# Patient Record
Sex: FEMALE | Race: BLACK OR AFRICAN AMERICAN | Employment: OTHER | URBAN - METROPOLITAN AREA
[De-identification: names, ages, dates, MRNs, and addresses within clinical notes are randomized per-mention and may not be internally consistent; named-entity substitution may affect disease eponyms.]

---

## 2017-08-04 ENCOUNTER — HOSPITAL ENCOUNTER (EMERGENCY)
Age: 82
Discharge: HOME | End: 2017-08-05
Attending: EMERGENCY MEDICINE
Payer: MEDICARE

## 2017-08-04 ENCOUNTER — APPOINTMENT (OUTPATIENT)
Dept: GENERAL RADIOLOGY | Age: 82
End: 2017-08-04
Attending: PHYSICIAN ASSISTANT
Payer: MEDICARE

## 2017-08-04 DIAGNOSIS — N39.0 URINARY TRACT INFECTION WITHOUT HEMATURIA, SITE UNSPECIFIED: ICD-10-CM

## 2017-08-04 DIAGNOSIS — R07.89 ATYPICAL CHEST PAIN: Primary | ICD-10-CM

## 2017-08-04 LAB
ALBUMIN SERPL BCP-MCNC: 3.4 G/DL (ref 3.4–5)
ALBUMIN/GLOB SERPL: 0.8 {RATIO} (ref 0.8–1.7)
ALP SERPL-CCNC: 93 U/L (ref 45–117)
ALT SERPL-CCNC: 23 U/L (ref 13–56)
ANION GAP BLD CALC-SCNC: 12 MMOL/L (ref 3–18)
APPEARANCE UR: CLEAR
AST SERPL W P-5'-P-CCNC: 23 U/L (ref 15–37)
BACTERIA URNS QL MICRO: ABNORMAL /HPF
BASOPHILS # BLD AUTO: 0 K/UL (ref 0–0.06)
BASOPHILS # BLD: 0 % (ref 0–2)
BILIRUB SERPL-MCNC: 0.4 MG/DL (ref 0.2–1)
BILIRUB UR QL: NEGATIVE
BUN SERPL-MCNC: 15 MG/DL (ref 7–18)
BUN/CREAT SERPL: 14 (ref 12–20)
CALCIUM SERPL-MCNC: 9.1 MG/DL (ref 8.5–10.1)
CHLORIDE SERPL-SCNC: 107 MMOL/L (ref 100–108)
CK MB CFR SERPL CALC: NORMAL % (ref 0–4)
CK MB SERPL-MCNC: <1 NG/ML (ref 5–25)
CK SERPL-CCNC: 95 U/L (ref 26–192)
CO2 SERPL-SCNC: 25 MMOL/L (ref 21–32)
COLOR UR: YELLOW
CREAT SERPL-MCNC: 1.1 MG/DL (ref 0.6–1.3)
DIFFERENTIAL METHOD BLD: ABNORMAL
EOSINOPHIL # BLD: 0.2 K/UL (ref 0–0.4)
EOSINOPHIL NFR BLD: 2 % (ref 0–5)
EPITH CASTS URNS QL MICRO: ABNORMAL /LPF (ref 0–5)
ERYTHROCYTE [DISTWIDTH] IN BLOOD BY AUTOMATED COUNT: 13.8 % (ref 11.6–14.5)
GLOBULIN SER CALC-MCNC: 4.4 G/DL (ref 2–4)
GLUCOSE SERPL-MCNC: 113 MG/DL (ref 74–99)
GLUCOSE UR STRIP.AUTO-MCNC: NEGATIVE MG/DL
HCT VFR BLD AUTO: 37.1 % (ref 35–45)
HGB BLD-MCNC: 12.5 G/DL (ref 12–16)
HGB UR QL STRIP: NEGATIVE
KETONES UR QL STRIP.AUTO: NEGATIVE MG/DL
LEUKOCYTE ESTERASE UR QL STRIP.AUTO: ABNORMAL
LYMPHOCYTES # BLD AUTO: 24 % (ref 21–52)
LYMPHOCYTES # BLD: 2.7 K/UL (ref 0.9–3.6)
MCH RBC QN AUTO: 30.1 PG (ref 24–34)
MCHC RBC AUTO-ENTMCNC: 33.7 G/DL (ref 31–37)
MCV RBC AUTO: 89.4 FL (ref 74–97)
MONOCYTES # BLD: 0.9 K/UL (ref 0.05–1.2)
MONOCYTES NFR BLD AUTO: 8 % (ref 3–10)
NEUTS SEG # BLD: 7.3 K/UL (ref 1.8–8)
NEUTS SEG NFR BLD AUTO: 66 % (ref 40–73)
NITRITE UR QL STRIP.AUTO: NEGATIVE
PH UR STRIP: 5 [PH] (ref 5–8)
PLATELET # BLD AUTO: 310 K/UL (ref 135–420)
PMV BLD AUTO: 9.5 FL (ref 9.2–11.8)
POTASSIUM SERPL-SCNC: 3.9 MMOL/L (ref 3.5–5.5)
PROT SERPL-MCNC: 7.8 G/DL (ref 6.4–8.2)
PROT UR STRIP-MCNC: NEGATIVE MG/DL
RBC # BLD AUTO: 4.15 M/UL (ref 4.2–5.3)
RBC #/AREA URNS HPF: ABNORMAL /HPF (ref 0–5)
SODIUM SERPL-SCNC: 144 MMOL/L (ref 136–145)
SP GR UR REFRACTOMETRY: 1.02 (ref 1–1.03)
TROPONIN I SERPL-MCNC: 0.05 NG/ML (ref 0–0.06)
UROBILINOGEN UR QL STRIP.AUTO: 0.2 EU/DL (ref 0.2–1)
WBC # BLD AUTO: 11.1 K/UL (ref 4.6–13.2)
WBC URNS QL MICRO: ABNORMAL /HPF (ref 0–5)

## 2017-08-04 PROCEDURE — 80053 COMPREHEN METABOLIC PANEL: CPT | Performed by: PHYSICIAN ASSISTANT

## 2017-08-04 PROCEDURE — 82550 ASSAY OF CK (CPK): CPT | Performed by: PHYSICIAN ASSISTANT

## 2017-08-04 PROCEDURE — 99285 EMERGENCY DEPT VISIT HI MDM: CPT

## 2017-08-04 PROCEDURE — 71020 XR CHEST PA LAT: CPT

## 2017-08-04 PROCEDURE — 93005 ELECTROCARDIOGRAM TRACING: CPT

## 2017-08-04 PROCEDURE — 85025 COMPLETE CBC W/AUTO DIFF WBC: CPT | Performed by: PHYSICIAN ASSISTANT

## 2017-08-04 PROCEDURE — 81001 URINALYSIS AUTO W/SCOPE: CPT | Performed by: PHYSICIAN ASSISTANT

## 2017-08-04 NOTE — ED TRIAGE NOTES
Patient with complaints of left arm, chest and pain under her shoulder blade since last night. Patient states that at times it feels like it is taking her breath away. Sepsis Screening completed    (  )Patient meets SIRS criteria. (x  )Patient does not meet SIRS criteria.       SIRS Criteria is achieved when two or more of the following are present   Temperature < 96.8°F (36°C) or > 100.9°F (38.3°C)   Heart Rate > 90 beats per minute   Respiratory Rate > 20 breaths per minute   WBC count > 12,000 or <4,000 or > 10% bands

## 2017-08-04 NOTE — ED PROVIDER NOTES
HPI Comments: 7:44 PM  Bridgette France is a 80 y.o. female with a PMHx of CVA and HTN who presents to the emergency department C/O intermittent CP that radiates down her right arm, onset last night. CP is not associated with exertion. Associated symptoms include SOB, light-headedness (with exertion). Pt states she has had CP like this before, but not with associated dizziness. PMHx of arthritis, and pt has an \"irregular heartbeat\" for which she takes Aggrenox. Other sxs include dysuria and the pt suspects a UTI. Patient denies PHx of MI, and any other symptoms or complaints. Patient is a 80 y.o. female presenting with chest pain. The history is provided by the patient. No  was used. Chest Pain (Angina)    This is a chronic problem. The current episode started 12 to 24 hours ago. The pain radiates to the right arm. Associated symptoms include near-syncope and shortness of breath.         Past Medical History:   Diagnosis Date    Arthritis     osteo    Asthma     inhaler as needed - every 2 to 3 years    Chronic pain     knee and hip    GERD (gastroesophageal reflux disease)     Hypertension     5yrs    Irritable bowel     Other ill-defined conditions     h/o migraines    Other ill-defined conditions     h/o dizzy    Other ill-defined conditions     IBS; hiatal hernia    Stroke ([address] 9 years ago     stroke - no deficits    Stroke ([address]     In 1997/98       Past Surgical History:   Procedure Laterality Date    HX CHOLECYSTECTOMY      HX HERNIA REPAIR      umbilical twice    HX INTRAOCULAR LENS PROSTHESIS      HX KNEE ARTHROSCOPY      left    HX OTHER SURGICAL      herniated disc surgery    HX TUBAL LIGATION      LAP UMBILICAL HERNIA REPAIR      LAP,CHOLECYSTECTOMY           Family History:   Problem Relation Age of Onset    Malignant Hyperthermia Neg Hx     Pseudocholinesterase Deficiency Neg Hx     Post-op Nausea/Vomiting Neg Hx     Delayed Awakening Neg Hx     Emergence Delirium Neg Hx     Post-op Cognitive Dysfunction Neg Hx     Other Neg Hx        Social History     Social History    Marital status: SINGLE     Spouse name: N/A    Number of children: N/A    Years of education: N/A     Occupational History    Not on file. Social History Main Topics    Smoking status: Never Smoker    Smokeless tobacco: Never Used    Alcohol use No    Drug use: No    Sexual activity: Not on file     Other Topics Concern    Not on file     Social History Narrative         ALLERGIES: Review of patient's allergies indicates no known allergies. Review of Systems   Respiratory: Positive for shortness of breath. Cardiovascular: Positive for chest pain and near-syncope. Genitourinary: Positive for dysuria. Neurological: Positive for light-headedness. All other systems reviewed and are negative. Vitals:    08/04/17 2300 08/04/17 2330 08/05/17 0030 08/05/17 0100   BP: 127/48 137/53 150/51 124/66   Pulse: (!) 59 (!) 53 (!) 45 (!) 52   Resp: 21 17 19 20   Temp:       SpO2:  99% 98% 97%   Weight:       Height:                Physical Exam   Constitutional: She is oriented to person, place, and time. She appears well-developed and well-nourished. Obese female, NAD, non toxic    HENT:   Head: Normocephalic and atraumatic. Neck: Normal range of motion. Neck supple. Cardiovascular: Normal rate, regular rhythm, normal heart sounds and intact distal pulses. No murmur heard. Pulmonary/Chest: Effort normal and breath sounds normal. No respiratory distress. She has no wheezes. She has no rales. She exhibits tenderness (minimal ). Abdominal: Soft. Bowel sounds are normal. She exhibits no distension. There is no tenderness. There is no rebound and no guarding. Musculoskeletal:   No leg swelling or tenderness    Neurological: She is alert and oriented to person, place, and time. Psychiatric: She has a normal mood and affect.  Judgment normal.   Nursing note and vitals reviewed. RESULTS:  PULSE OXIMETRY NOTE:  Pulse-ox is 100% on RA  Interpretation: Normal       6:54 PM  EKG interpretation: (Preliminary)  NSR; 63 bpm; 94 ms QRS duration  EKG read by Krystin Evans MD at 6:54 PM    EKG interpretation: (Subsequent)  12:28 AM   Marked Sinus Bradycardia; 47 bpm; Similar to initial EKG  EKG read by Nevaeh Gamble PA-C    XR CHEST PA LAT    (Results Pending)      11:01 PM  RADIOLOGY FINDINGS  Chest X-ray shows no acute process  Pending review by Radiologist  Recorded by Melba Orosco ED Scribe, as dictated by Nevaeh Gamble PA-C.     Labs Reviewed   CBC WITH AUTOMATED DIFF - Abnormal; Notable for the following:        Result Value    RBC 4.15 (*)     All other components within normal limits   METABOLIC PANEL, COMPREHENSIVE - Abnormal; Notable for the following:     Glucose 113 (*)     GFR est AA 58 (*)     GFR est non-AA 48 (*)     Globulin 4.4 (*)     All other components within normal limits   URINALYSIS W/ RFLX MICROSCOPIC - Abnormal; Notable for the following:     Leukocyte Esterase SMALL (*)     All other components within normal limits   URINE MICROSCOPIC ONLY - Abnormal; Notable for the following:     Bacteria 1+ (*)     All other components within normal limits   CARDIAC PANEL,(CK, CKMB & TROPONIN)   CARDIAC PANEL,(CK, CKMB & TROPONIN)       Recent Results (from the past 12 hour(s))   EKG, 12 LEAD, INITIAL    Collection Time: 08/04/17  6:51 PM   Result Value Ref Range    Ventricular Rate 63 BPM    Atrial Rate 63 BPM    P-R Interval 162 ms    QRS Duration 94 ms    Q-T Interval 426 ms    QTC Calculation (Bezet) 435 ms    Calculated P Axis 37 degrees    Calculated R Axis -28 degrees    Calculated T Axis 25 degrees    Diagnosis       Normal sinus rhythm  Voltage criteria for left ventricular hypertrophy  Abnormal ECG  When compared with ECG of 07-OCT-2016 16:00,  No significant change was found     URINALYSIS W/ RFLX MICROSCOPIC    Collection Time: 08/04/17  7:45 PM Result Value Ref Range    Color YELLOW      Appearance CLEAR      Specific gravity 1.017 1.005 - 1.030      pH (UA) 5.0 5.0 - 8.0      Protein NEGATIVE  NEG mg/dL    Glucose NEGATIVE  NEG mg/dL    Ketone NEGATIVE  NEG mg/dL    Bilirubin NEGATIVE  NEG      Blood NEGATIVE  NEG      Urobilinogen 0.2 0.2 - 1.0 EU/dL    Nitrites NEGATIVE  NEG      Leukocyte Esterase SMALL (A) NEG     URINE MICROSCOPIC ONLY    Collection Time: 08/04/17  7:45 PM   Result Value Ref Range    WBC 4 to 6 0 - 5 /hpf    RBC 1 to 2 0 - 5 /hpf    Epithelial cells 2 to 3 0 - 5 /lpf    Bacteria 1+ (A) NEG /hpf   CBC WITH AUTOMATED DIFF    Collection Time: 08/04/17  8:15 PM   Result Value Ref Range    WBC 11.1 4.6 - 13.2 K/uL    RBC 4.15 (L) 4.20 - 5.30 M/uL    HGB 12.5 12.0 - 16.0 g/dL    HCT 37.1 35.0 - 45.0 %    MCV 89.4 74.0 - 97.0 FL    MCH 30.1 24.0 - 34.0 PG    MCHC 33.7 31.0 - 37.0 g/dL    RDW 13.8 11.6 - 14.5 %    PLATELET 417 953 - 554 K/uL    MPV 9.5 9.2 - 11.8 FL    NEUTROPHILS 66 40 - 73 %    LYMPHOCYTES 24 21 - 52 %    MONOCYTES 8 3 - 10 %    EOSINOPHILS 2 0 - 5 %    BASOPHILS 0 0 - 2 %    ABS. NEUTROPHILS 7.3 1.8 - 8.0 K/UL    ABS. LYMPHOCYTES 2.7 0.9 - 3.6 K/UL    ABS. MONOCYTES 0.9 0.05 - 1.2 K/UL    ABS. EOSINOPHILS 0.2 0.0 - 0.4 K/UL    ABS. BASOPHILS 0.0 0.0 - 0.06 K/UL    DF AUTOMATED     METABOLIC PANEL, COMPREHENSIVE    Collection Time: 08/04/17  8:15 PM   Result Value Ref Range    Sodium 144 136 - 145 mmol/L    Potassium 3.9 3.5 - 5.5 mmol/L    Chloride 107 100 - 108 mmol/L    CO2 25 21 - 32 mmol/L    Anion gap 12 3.0 - 18 mmol/L    Glucose 113 (H) 74 - 99 mg/dL    BUN 15 7.0 - 18 MG/DL    Creatinine 1.10 0.6 - 1.3 MG/DL    BUN/Creatinine ratio 14 12 - 20      GFR est AA 58 (L) >60 ml/min/1.73m2    GFR est non-AA 48 (L) >60 ml/min/1.73m2    Calcium 9.1 8.5 - 10.1 MG/DL    Bilirubin, total 0.4 0.2 - 1.0 MG/DL    ALT (SGPT) 23 13 - 56 U/L    AST (SGOT) 23 15 - 37 U/L    Alk.  phosphatase 93 45 - 117 U/L    Protein, total 7.8 6.4 - 8.2 g/dL    Albumin 3.4 3.4 - 5.0 g/dL    Globulin 4.4 (H) 2.0 - 4.0 g/dL    A-G Ratio 0.8 0.8 - 1.7     CARDIAC PANEL,(CK, CKMB & TROPONIN)    Collection Time: 08/04/17  8:15 PM   Result Value Ref Range    CK 95 26 - 192 U/L    CK - MB <1.0 <3.6 ng/ml    CK-MB Index  0.0 - 4.0 %     CALCULATION NOT PERFORMED WHEN RESULT IS BELOW LINEAR LIMIT    Troponin-I, Qt. 0.05 0.00 - 0.06 NG/ML   EKG, 12 LEAD, SUBSEQUENT    Collection Time: 08/05/17 12:09 AM   Result Value Ref Range    Ventricular Rate 47 BPM    Atrial Rate 47 BPM    P-R Interval 192 ms    QRS Duration 94 ms    Q-T Interval 464 ms    QTC Calculation (Bezet) 410 ms    Calculated P Axis 54 degrees    Calculated R Axis -5 degrees    Calculated T Axis 21 degrees    Diagnosis       Marked sinus bradycardia  Voltage criteria for left ventricular hypertrophy  Abnormal ECG  When compared with ECG of 04-AUG-2017 18:51,  No significant change was found     CARDIAC PANEL,(CK, CKMB & TROPONIN)    Collection Time: 08/05/17 12:20 AM   Result Value Ref Range    CK 89 26 - 192 U/L    CK - MB <1.0 <3.6 ng/ml    CK-MB Index  0.0 - 4.0 %     CALCULATION NOT PERFORMED WHEN RESULT IS BELOW LINEAR LIMIT    Troponin-I, Qt. 0.05 0.00 - 0.06 NG/ML         MDM  Number of Diagnoses or Management Options  Atypical chest pain:   Urinary tract infection without hematuria, site unspecified:   Diagnosis management comments: ACS, MI, Arrhthymia, pericarditis, pneumonia, bronchitis, musculoskeletal pain, Ptx, doubt PE          Amount and/or Complexity of Data Reviewed  Clinical lab tests: ordered and reviewed  Tests in the radiology section of CPT®: ordered and reviewed (Chest XR, EKG)  Tests in the medicine section of CPT®: ordered and reviewed (EKG)  Independent visualization of images, tracings, or specimens: yes (Chest XR, EKG)      ED Course     MEDICATIONS GIVEN:  Medications - No data to display    Procedures    PROGRESS NOTE:   7:55 PM  Initial assesment performed.   Written by Messi Antoine, ED Scribe, as dictated by Hannah Luo PA-C.    CONSULT NOTE:   10:02 PM  Hannah Luo PA-C spoke with Jihan Burleson. Marty Silveira MD   Discussed pt's hx, disposition, and available diagnostic and imaging results in the ED. Reviewed care plans. He agrees repeat cardiac panel because of the patient's age, if normal, d/c.        DISCHARGE NOTE:  1:14 AM  Estefania Reina's  results have been reviewed with her. She has been counseled regarding her diagnosis, treatment, and plan. She verbally conveys understanding and agreement of the signs, symptoms, diagnosis, treatment and prognosis and additionally agrees to follow up as discussed. She also agrees with the care-plan and conveys that all of her questions have been answered. I have also provided discharge instructions for her that include: educational information regarding their diagnosis and treatment, and list of reasons why they would want to return to the ED prior to their follow-up appointment, should her condition change. CLINICAL IMPRESSION:    1. Atypical chest pain    2. Urinary tract infection without hematuria, site unspecified        PLAN:  1. D/C Home  2. Discharge Medication List as of 8/5/2017  1:14 AM      START taking these medications    Details   nitrofurantoin, macrocrystal-monohydrate, (MACROBID) 100 mg capsule Take 1 Cap by mouth two (2) times a day for 3 days. , Normal, Disp-6 Cap, R-0         CONTINUE these medications which have NOT CHANGED    Details   Dexlansoprazole (DEXILANT) 60 mg CpDB Take 60 mg by mouth as needed., Historical Med      naproxen sodium (ALEVE) 220 mg cap Take  by mouth., Historical Med      valsartan-hydrochlorothiazide (DIOVAN HCT) 160-25 mg per tablet Take 1 Tab by mouth daily. , Historical Med      potassium chloride (KLOR-CON) 20 mEq packet Take 20 mEq by mouth two (2) times a day.  , Historical Med      dipyridamole-aspirin (AGGRENOX) 200-25 mg per SR capsule Take 1 Cap by mouth daily.   , Historical Med      calcium carbonate (TUMS) 200 mg calcium (500 mg) Chew Take 1 Tab by mouth as needed.  , Historical Med      multivitamin (ONE A DAY) tablet Take 1 Tab by mouth daily. , Historical Med           3. Follow-up Information     Follow up With Details Comments Contact Info    Your Cardiologist Schedule an appointment as soon as possible for a visit in 2 days      [facility]  As needed, If symptoms worsen [address] [place] [id number]  [phone]            ATTESTATION:  This note is prepared by Nic Mann, acting as a Scribe for Zulay Jimenez PA-C on 7:55 PM on 8/4/2017 . Zulay Jimenez PA-C: The scribe's documentation has been prepared under my direction and personally reviewed by me in its entirety.

## 2017-08-05 VITALS
HEART RATE: 52 BPM | HEIGHT: 63 IN | TEMPERATURE: 98.4 F | BODY MASS INDEX: 42.17 KG/M2 | RESPIRATION RATE: 20 BRPM | WEIGHT: 238 LBS | SYSTOLIC BLOOD PRESSURE: 124 MMHG | OXYGEN SATURATION: 97 % | DIASTOLIC BLOOD PRESSURE: 66 MMHG

## 2017-08-05 LAB
ATRIAL RATE: 47 BPM
ATRIAL RATE: 63 BPM
CALCULATED P AXIS, ECG09: 37 DEGREES
CALCULATED P AXIS, ECG09: 54 DEGREES
CALCULATED R AXIS, ECG10: -28 DEGREES
CALCULATED R AXIS, ECG10: -5 DEGREES
CALCULATED T AXIS, ECG11: 21 DEGREES
CALCULATED T AXIS, ECG11: 25 DEGREES
CK MB CFR SERPL CALC: NORMAL % (ref 0–4)
CK MB SERPL-MCNC: <1 NG/ML (ref 5–25)
CK SERPL-CCNC: 89 U/L (ref 26–192)
DIAGNOSIS, 93000: NORMAL
DIAGNOSIS, 93000: NORMAL
P-R INTERVAL, ECG05: 162 MS
P-R INTERVAL, ECG05: 192 MS
Q-T INTERVAL, ECG07: 426 MS
Q-T INTERVAL, ECG07: 464 MS
QRS DURATION, ECG06: 94 MS
QRS DURATION, ECG06: 94 MS
QTC CALCULATION (BEZET), ECG08: 410 MS
QTC CALCULATION (BEZET), ECG08: 435 MS
TROPONIN I SERPL-MCNC: 0.05 NG/ML (ref 0–0.06)
VENTRICULAR RATE, ECG03: 47 BPM
VENTRICULAR RATE, ECG03: 63 BPM

## 2017-08-05 PROCEDURE — 82550 ASSAY OF CK (CPK): CPT | Performed by: PHYSICIAN ASSISTANT

## 2017-08-05 PROCEDURE — 93005 ELECTROCARDIOGRAM TRACING: CPT

## 2017-08-05 RX ORDER — NITROFURANTOIN 25; 75 MG/1; MG/1
100 CAPSULE ORAL 2 TIMES DAILY
Qty: 6 CAP | Refills: 0 | Status: SHIPPED | OUTPATIENT
Start: 2017-08-05 | End: 2017-08-08

## 2017-08-05 RX ORDER — NITROFURANTOIN 25; 75 MG/1; MG/1
100 CAPSULE ORAL 2 TIMES DAILY
Qty: 6 CAP | Refills: 0 | Status: SHIPPED | OUTPATIENT
Start: 2017-08-05 | End: 2017-08-05

## 2017-08-05 NOTE — DISCHARGE INSTRUCTIONS
Chest Pain: Care Instructions  Your Care Instructions  There are many things that can cause chest pain. Some are not serious and will get better on their own in a few days. But some kinds of chest pain need more testing and treatment. Your doctor may have recommended a follow-up visit in the next 8 to 12 hours. If you are not getting better, you may need more tests or treatment. Even though your doctor has released you, you still need to watch for any problems. The doctor carefully checked you, but sometimes problems can develop later. If you have new symptoms or if your symptoms do not get better, get medical care right away. If you have worse or different chest pain or pressure that lasts more than 5 minutes or you passed out (lost consciousness), call 911 or seek other emergency help right away. A medical visit is only one step in your treatment. Even if you feel better, you still need to do what your doctor recommends, such as going to all suggested follow-up appointments and taking medicines exactly as directed. This will help you recover and help prevent future problems. How can you care for yourself at home? · Rest until you feel better. · Take your medicine exactly as prescribed. Call your doctor if you think you are having a problem with your medicine. · Do not drive after taking a prescription pain medicine. When should you call for help? Call 911 if:  · You passed out (lost consciousness). · You have severe difficulty breathing. · You have symptoms of a heart attack. These may include:  ¨ Chest pain or pressure, or a strange feeling in your chest.  ¨ Sweating. ¨ Shortness of breath. ¨ Nausea or vomiting. ¨ Pain, pressure, or a strange feeling in your back, neck, jaw, or upper belly or in one or both shoulders or arms. ¨ Lightheadedness or sudden weakness. ¨ A fast or irregular heartbeat.   After you call 911, the  may tell you to chew 1 adult-strength or 2 to 4 low-dose aspirin. Wait for an ambulance. Do not try to drive yourself. Call your doctor today if:  · You have any trouble breathing. · Your chest pain gets worse. · You are dizzy or lightheaded, or you feel like you may faint. · You are not getting better as expected. · You are having new or different chest pain. Where can you learn more? Go to http://shell-david.info/. Enter A120 in the search box to learn more about \"Chest Pain: Care Instructions. \"  Current as of: March 20, 2017  Content Version: 11.3  © 9208-2162 [employer]. Care instructions adapted under license by [employer] (which disclaims liability or warranty for this information). If you have questions about a medical condition or this instruction, always ask your healthcare professional. [address] any warranty or liability for your use of this information. Urinary Tract Infection in Women: Care Instructions  Your Care Instructions    A urinary tract infection, or UTI, is a general term for an infection anywhere between the kidneys and the urethra (where urine comes out). Most UTIs are bladder infections. They often cause pain or burning when you urinate. UTIs are caused by bacteria and can be cured with antibiotics. Be sure to complete your treatment so that the infection goes away. Follow-up care is a key part of your treatment and safety. Be sure to make and go to all appointments, and call your doctor if you are having problems. It's also a good idea to know your test results and keep a list of the medicines you take. How can you care for yourself at home? · Take your antibiotics as directed. Do not stop taking them just because you feel better. You need to take the full course of antibiotics. · Drink extra water and other fluids for the next day or two. This may help wash out the bacteria that are causing the infection.  (If you have kidney, heart, or liver disease and have to limit fluids, talk with your doctor before you increase your fluid intake.)  · Avoid drinks that are carbonated or have caffeine. They can irritate the bladder. · Urinate often. Try to empty your bladder each time. · To relieve pain, take a hot bath or lay a heating pad set on low over your lower belly or genital area. Never go to sleep with a heating pad in place. To prevent UTIs  · Drink plenty of water each day. This helps you urinate often, which clears bacteria from your system. (If you have kidney, heart, or liver disease and have to limit fluids, talk with your doctor before you increase your fluid intake.)  · Urinate when you need to. · Urinate right after you have sex. · Change sanitary pads often. · Avoid douches, bubble baths, feminine hygiene sprays, and other feminine hygiene products that have deodorants. · After going to the bathroom, wipe from front to back. When should you call for help? Call your doctor now or seek immediate medical care if:  · Symptoms such as fever, chills, nausea, or vomiting get worse or appear for the first time. · You have new pain in your back just below your rib cage. This is called flank pain. · There is new blood or pus in your urine. · You have any problems with your antibiotic medicine. Watch closely for changes in your health, and be sure to contact your doctor if:  · You are not getting better after taking an antibiotic for 2 days. · Your symptoms go away but then come back. Where can you learn more? Go to http://shell-david.info/. Enter Z394 in the search box to learn more about \"Urinary Tract Infection in Women: Care Instructions. \"  Current as of: November 28, 2016  Content Version: 11.3  © 0361-6036 [employer]. Care instructions adapted under license by [employer] (which disclaims liability or warranty for this information).  If you have questions about a medical condition or this instruction, always ask your healthcare professional. [place] [address] any warranty or liability for your use of this information.

## 2017-08-05 NOTE — ED NOTES
[facility] was discharged in good and improved condition. The patient's diagnosis, condition and treatment were explained to patient and aftercare instructions were given. The patient verbalized good understanding. Patient armband removed and both labels and armband were placed in shred bin. Patient left ER **ambulatory with steady gait in no acute distress.

## 2017-08-05 NOTE — ED NOTES
Patient on call bell, asking about results. Pt informed awaiting repeat cardiac enzymes and EKG at midnight. Pt remains on cardiac monitor, resting in bed in no distress. Daughter at bedside.

## 2017-08-07 NOTE — CALL BACK NOTE
6:30 PM  08/07/17    Received notification of failed transmission of electronic rx for Macrobid. Attempted to call pt, no answer. Left message to return call.      Keely Hathaway PA-C

## 2017-11-29 ENCOUNTER — HOSPITAL ENCOUNTER (EMERGENCY)
Age: 82
Discharge: HOME | End: 2017-11-29
Attending: EMERGENCY MEDICINE
Payer: MEDICARE

## 2017-11-29 ENCOUNTER — APPOINTMENT (OUTPATIENT)
Dept: GENERAL RADIOLOGY | Age: 82
End: 2017-11-29
Attending: EMERGENCY MEDICINE
Payer: MEDICARE

## 2017-11-29 VITALS
DIASTOLIC BLOOD PRESSURE: 56 MMHG | BODY MASS INDEX: 42.52 KG/M2 | HEIGHT: 63 IN | RESPIRATION RATE: 28 BRPM | WEIGHT: 240 LBS | TEMPERATURE: 98.3 F | OXYGEN SATURATION: 98 % | HEART RATE: 62 BPM | SYSTOLIC BLOOD PRESSURE: 161 MMHG

## 2017-11-29 DIAGNOSIS — R07.9 CHEST PAIN, UNSPECIFIED TYPE: Primary | ICD-10-CM

## 2017-11-29 DIAGNOSIS — K21.9 GASTROESOPHAGEAL REFLUX DISEASE WITHOUT ESOPHAGITIS: ICD-10-CM

## 2017-11-29 LAB
ALBUMIN SERPL-MCNC: 3.5 G/DL (ref 3.4–5)
ALBUMIN/GLOB SERPL: 0.7 {RATIO} (ref 0.8–1.7)
ALP SERPL-CCNC: 104 U/L (ref 45–117)
ALT SERPL-CCNC: 31 U/L (ref 13–56)
ANION GAP SERPL CALC-SCNC: 12 MMOL/L (ref 3–18)
APPEARANCE UR: CLEAR
AST SERPL-CCNC: 26 U/L (ref 15–37)
BASOPHILS # BLD: 0 K/UL (ref 0–0.06)
BASOPHILS NFR BLD: 0 % (ref 0–2)
BILIRUB SERPL-MCNC: 0.3 MG/DL (ref 0.2–1)
BILIRUB UR QL: NEGATIVE
BUN SERPL-MCNC: 18 MG/DL (ref 7–18)
BUN/CREAT SERPL: 19 (ref 12–20)
CALCIUM SERPL-MCNC: 9.9 MG/DL (ref 8.5–10.1)
CHLORIDE SERPL-SCNC: 107 MMOL/L (ref 100–108)
CK MB CFR SERPL CALC: NORMAL % (ref 0–4)
CK MB CFR SERPL CALC: NORMAL % (ref 0–4)
CK MB SERPL-MCNC: <1 NG/ML (ref 5–25)
CK MB SERPL-MCNC: <1 NG/ML (ref 5–25)
CK SERPL-CCNC: 119 U/L (ref 26–192)
CK SERPL-CCNC: 99 U/L (ref 26–192)
CO2 SERPL-SCNC: 26 MMOL/L (ref 21–32)
COLOR UR: YELLOW
CREAT SERPL-MCNC: 0.97 MG/DL (ref 0.6–1.3)
D DIMER PPP FEU-MCNC: 0.58 UG/ML(FEU)
DIFFERENTIAL METHOD BLD: ABNORMAL
EOSINOPHIL # BLD: 0.2 K/UL (ref 0–0.4)
EOSINOPHIL NFR BLD: 2 % (ref 0–5)
ERYTHROCYTE [DISTWIDTH] IN BLOOD BY AUTOMATED COUNT: 14.4 % (ref 11.6–14.5)
GLOBULIN SER CALC-MCNC: 5 G/DL (ref 2–4)
GLUCOSE SERPL-MCNC: 89 MG/DL (ref 74–99)
GLUCOSE UR STRIP.AUTO-MCNC: NEGATIVE MG/DL
HCT VFR BLD AUTO: 37.7 % (ref 35–45)
HGB BLD-MCNC: 12.8 G/DL (ref 12–16)
HGB UR QL STRIP: NEGATIVE
KETONES UR QL STRIP.AUTO: NEGATIVE MG/DL
LEUKOCYTE ESTERASE UR QL STRIP.AUTO: NEGATIVE
LYMPHOCYTES # BLD: 2.5 K/UL (ref 0.9–3.6)
LYMPHOCYTES NFR BLD: 21 % (ref 21–52)
MCH RBC QN AUTO: 30.7 PG (ref 24–34)
MCHC RBC AUTO-ENTMCNC: 34 G/DL (ref 31–37)
MCV RBC AUTO: 90.4 FL (ref 74–97)
MONOCYTES # BLD: 0.8 K/UL (ref 0.05–1.2)
MONOCYTES NFR BLD: 7 % (ref 3–10)
NEUTS SEG # BLD: 8.4 K/UL (ref 1.8–8)
NEUTS SEG NFR BLD: 70 % (ref 40–73)
NITRITE UR QL STRIP.AUTO: NEGATIVE
PH UR STRIP: 5 [PH] (ref 5–8)
PLATELET # BLD AUTO: 359 K/UL (ref 135–420)
PMV BLD AUTO: 9.8 FL (ref 9.2–11.8)
POTASSIUM SERPL-SCNC: 4.2 MMOL/L (ref 3.5–5.5)
PROT SERPL-MCNC: 8.5 G/DL (ref 6.4–8.2)
PROT UR STRIP-MCNC: NEGATIVE MG/DL
RBC # BLD AUTO: 4.17 M/UL (ref 4.2–5.3)
SODIUM SERPL-SCNC: 145 MMOL/L (ref 136–145)
SP GR UR REFRACTOMETRY: 1.01 (ref 1–1.03)
TROPONIN I SERPL-MCNC: 0.04 NG/ML (ref 0–0.06)
TROPONIN I SERPL-MCNC: 0.04 NG/ML (ref 0–0.06)
UROBILINOGEN UR QL STRIP.AUTO: 0.2 EU/DL (ref 0.2–1)
WBC # BLD AUTO: 12 K/UL (ref 4.6–13.2)

## 2017-11-29 PROCEDURE — 93005 ELECTROCARDIOGRAM TRACING: CPT

## 2017-11-29 PROCEDURE — 85025 COMPLETE CBC W/AUTO DIFF WBC: CPT | Performed by: EMERGENCY MEDICINE

## 2017-11-29 PROCEDURE — 74011250637 HC RX REV CODE- 250/637: Performed by: EMERGENCY MEDICINE

## 2017-11-29 PROCEDURE — 81003 URINALYSIS AUTO W/O SCOPE: CPT | Performed by: EMERGENCY MEDICINE

## 2017-11-29 PROCEDURE — 96374 THER/PROPH/DIAG INJ IV PUSH: CPT

## 2017-11-29 PROCEDURE — 82550 ASSAY OF CK (CPK): CPT | Performed by: EMERGENCY MEDICINE

## 2017-11-29 PROCEDURE — 71010 XR CHEST PORT: CPT

## 2017-11-29 PROCEDURE — 99285 EMERGENCY DEPT VISIT HI MDM: CPT

## 2017-11-29 PROCEDURE — 74011000250 HC RX REV CODE- 250: Performed by: EMERGENCY MEDICINE

## 2017-11-29 PROCEDURE — 80053 COMPREHEN METABOLIC PANEL: CPT | Performed by: EMERGENCY MEDICINE

## 2017-11-29 PROCEDURE — 85379 FIBRIN DEGRADATION QUANT: CPT | Performed by: EMERGENCY MEDICINE

## 2017-11-29 RX ORDER — MAG HYDROX/ALUMINUM HYD/SIMETH 200-200-20
30 SUSPENSION, ORAL (FINAL DOSE FORM) ORAL
Qty: 300 ML | Refills: 0 | Status: SHIPPED | OUTPATIENT
Start: 2017-11-29 | End: 2018-06-04

## 2017-11-29 RX ORDER — FAMOTIDINE 10 MG/ML
20 INJECTION INTRAVENOUS
Status: COMPLETED | OUTPATIENT
Start: 2017-11-29 | End: 2017-11-29

## 2017-11-29 RX ADMIN — FAMOTIDINE 20 MG: 10 INJECTION, SOLUTION INTRAVENOUS at 18:50

## 2017-11-29 RX ADMIN — Medication 30 ML: at 18:50

## 2017-11-29 NOTE — ED TRIAGE NOTES
Pt came to ED with c/o mid sternal CP since last night. Pt stated she had dark stool with last BM today. Hx of a-fib and take blood thinner. Pt was given ASA in route.

## 2017-11-29 NOTE — ED PROVIDER NOTES
EMERGENCY DEPARTMENT HISTORY AND PHYSICAL EXAM    Date: 11/29/2017  Patient Name: Bennie Luna    History of Presenting Illness     Chief Complaint   Patient presents with    Chest Pain         History Provided By: Patient    Chief Complaint: CP  Duration: 1 Days  Timing:  Gradual  Location: chest  Associated Symptoms: black stool, Ha, abd pain and back pain    Additional History (Context):   6:09 PM  Bennie Luna is a 80 y.o. female with PMHX HTN and stroke who presents to the emergency department C/O 10/10 CP onset last night when pt was in bed trying to fall asleep. Per pt it started off as a burning sensation then pain started and radiated into right shoulder and back. Associated sxs include black stool, HA, abd pain and back pain. In ED now the pain is rated 5/10. Pt had cardiac cath done by . Pt denies Hx of blood stool, using pain meds of Pepto bismol , smoking, and any other sxs or complaints. PCP: Yoel Cardona MD    Current Outpatient Prescriptions   Medication Sig Dispense Refill    alum-mag hydroxide-simeth (MYLANTA) 110-840-20 mg/5 mL susp Take 30 mL by mouth every four (4) hours as needed. 300 mL 0    Dexlansoprazole (DEXILANT) 60 mg CpDB Take 60 mg by mouth as needed.  naproxen sodium (ALEVE) 220 mg cap Take  by mouth.  valsartan-hydrochlorothiazide (DIOVAN HCT) 160-25 mg per tablet Take 1 Tab by mouth daily.  potassium chloride (KLOR-CON) 20 mEq packet Take 20 mEq by mouth two (2) times a day.  dipyridamole-aspirin (AGGRENOX) 200-25 mg per SR capsule Take 1 Cap by mouth daily.  calcium carbonate (TUMS) 200 mg calcium (500 mg) Chew Take 1 Tab by mouth as needed.  multivitamin (ONE A DAY) tablet Take 1 Tab by mouth daily.            Past History     Past Medical History:  Past Medical History:   Diagnosis Date    Arthritis     osteo    Asthma     inhaler as needed - every 2 to 3 years    Chronic pain     knee and hip    GERD (gastroesophageal reflux disease)     Hypertension     5yrs    Irritable bowel     Other ill-defined conditions     h/o migraines    Other ill-defined conditions     h/o dizzy    Other ill-defined conditions     IBS; hiatal hernia    Stroke ([facility] [address] 9 years ago     stroke - no deficits    Stroke ([facility] [address]     In 1997/98       Past Surgical History:  Past Surgical History:   Procedure Laterality Date    HX CHOLECYSTECTOMY      HX HERNIA REPAIR      umbilical twice    HX INTRAOCULAR LENS PROSTHESIS      HX KNEE ARTHROSCOPY      left    HX OTHER SURGICAL      herniated disc surgery    HX TUBAL LIGATION      LAP UMBILICAL HERNIA REPAIR      LAP,CHOLECYSTECTOMY         Family History:  Family History   Problem Relation Age of Onset    Malignant Hyperthermia Neg Hx     Pseudocholinesterase Deficiency Neg Hx     Post-op Nausea/Vomiting Neg Hx     Delayed Awakening Neg Hx     Emergence Delirium Neg Hx     Post-op Cognitive Dysfunction Neg Hx     Other Neg Hx        Social History:  Social History   Substance Use Topics    Smoking status: Never Smoker    Smokeless tobacco: Never Used    Alcohol use No       Allergies:  No Known Allergies      Review of Systems   Review of Systems   Cardiovascular: Positive for chest pain. Gastrointestinal: Positive for abdominal pain. Musculoskeletal: Positive for arthralgias and back pain. Neurological: Positive for headaches. All other systems reviewed and are negative. Physical Exam     Vitals:    11/29/17 1748   BP: 151/49   Pulse: (!) 59   Resp: 18   Temp: 98.3 °F (36.8 °C)   SpO2: 100%   Weight: 108.9 kg (240 lb)   Height: 5' 3\" (1.6 m)     Physical Exam   Constitutional: She is oriented to person, place, and time. She appears well-developed and well-nourished. HENT:   Head: Normocephalic and atraumatic.    Right Ear: External ear normal.   Left Ear: External ear normal.   Nose: Nose normal.   Mouth/Throat: Oropharynx is clear and moist.   Eyes: Conjunctivae and EOM are normal. Pupils are equal, round, and reactive to light. Neck: Normal range of motion. Neck supple. No JVD present. No tracheal deviation present. Cardiovascular: Normal rate, regular rhythm, normal heart sounds and intact distal pulses. Exam reveals no gallop and no friction rub. No murmur heard. Pulmonary/Chest: Effort normal and breath sounds normal. No respiratory distress. She has no wheezes. She has no rales. Abdominal: Soft. Bowel sounds are normal. She exhibits no distension and no mass. There is no tenderness. There is no rebound and no guarding. Musculoskeletal: Normal range of motion. She exhibits no edema or tenderness. Neurological: She is alert and oriented to person, place, and time. She has normal reflexes. No cranial nerve deficit. Skin: Skin is warm and dry. No rash noted. Psychiatric: She has a normal mood and affect. Her behavior is normal.   Nursing note and vitals reviewed. Rectal exam: negative without mass, lesions or tenderness. dark brown stool hem negative. Normal tone.       Diagnostic Study Results     Labs -     Recent Results (from the past 12 hour(s))   EKG, 12 LEAD, INITIAL    Collection Time: 11/29/17  5:49 PM   Result Value Ref Range    Ventricular Rate 58 BPM    Atrial Rate 58 BPM    P-R Interval 158 ms    QRS Duration 84 ms    Q-T Interval 420 ms    QTC Calculation (Bezet) 412 ms    Calculated P Axis 40 degrees    Calculated R Axis -17 degrees    Calculated T Axis 25 degrees    Diagnosis       Sinus bradycardia  Voltage criteria for left ventricular hypertrophy  Abnormal ECG  When compared with ECG of 05-AUG-2017 00:09,  No significant change was found     CBC WITH AUTOMATED DIFF    Collection Time: 11/29/17  6:30 PM   Result Value Ref Range    WBC 12.0 4.6 - 13.2 K/uL    RBC 4.17 (L) 4.20 - 5.30 M/uL    HGB 12.8 12.0 - 16.0 g/dL    HCT 37.7 35.0 - 45.0 %    MCV 90.4 74.0 - 97.0 FL    MCH 30.7 24.0 - 34.0 PG    MCHC 34.0 31.0 - 37.0 g/dL RDW 14.4 11.6 - 14.5 %    PLATELET 029 409 - 990 K/uL    MPV 9.8 9.2 - 11.8 FL    NEUTROPHILS 70 40 - 73 %    LYMPHOCYTES 21 21 - 52 %    MONOCYTES 7 3 - 10 %    EOSINOPHILS 2 0 - 5 %    BASOPHILS 0 0 - 2 %    ABS. NEUTROPHILS 8.4 (H) 1.8 - 8.0 K/UL    ABS. LYMPHOCYTES 2.5 0.9 - 3.6 K/UL    ABS. MONOCYTES 0.8 0.05 - 1.2 K/UL    ABS. EOSINOPHILS 0.2 0.0 - 0.4 K/UL    ABS. BASOPHILS 0.0 0.0 - 0.06 K/UL    DF AUTOMATED     METABOLIC PANEL, COMPREHENSIVE    Collection Time: 11/29/17  6:30 PM   Result Value Ref Range    Sodium 145 136 - 145 mmol/L    Potassium 4.2 3.5 - 5.5 mmol/L    Chloride 107 100 - 108 mmol/L    CO2 26 21 - 32 mmol/L    Anion gap 12 3.0 - 18 mmol/L    Glucose 89 74 - 99 mg/dL    BUN 18 7.0 - 18 MG/DL    Creatinine 0.97 0.6 - 1.3 MG/DL    BUN/Creatinine ratio 19 12 - 20      GFR est AA >60 >60 ml/min/1.73m2    GFR est non-AA 55 (L) >60 ml/min/1.73m2    Calcium 9.9 8.5 - 10.1 MG/DL    Bilirubin, total 0.3 0.2 - 1.0 MG/DL    ALT (SGPT) 31 13 - 56 U/L    AST (SGOT) 26 15 - 37 U/L    Alk.  phosphatase 104 45 - 117 U/L    Protein, total 8.5 (H) 6.4 - 8.2 g/dL    Albumin 3.5 3.4 - 5.0 g/dL    Globulin 5.0 (H) 2.0 - 4.0 g/dL    A-G Ratio 0.7 (L) 0.8 - 1.7     CARDIAC PANEL,(CK, CKMB & TROPONIN)    Collection Time: 11/29/17  6:30 PM   Result Value Ref Range     26 - 192 U/L    CK - MB <1.0 <3.6 ng/ml    CK-MB Index  0.0 - 4.0 %     CALCULATION NOT PERFORMED WHEN RESULT IS BELOW LINEAR LIMIT    Troponin-I, Qt. 0.04 0.00 - 0.06 NG/ML   D DIMER    Collection Time: 11/29/17  6:30 PM   Result Value Ref Range    D DIMER 0.58 (H) <0.46 ug/ml(FEU)   CARDIAC PANEL,(CK, CKMB & TROPONIN)    Collection Time: 11/29/17  8:45 PM   Result Value Ref Range    CK 99 26 - 192 U/L    CK - MB <1.0 <3.6 ng/ml    CK-MB Index  0.0 - 4.0 %     CALCULATION NOT PERFORMED WHEN RESULT IS BELOW LINEAR LIMIT    Troponin-I, Qt. 0.04 0.00 - 0.06 NG/ML   URINALYSIS W/ RFLX MICROSCOPIC    Collection Time: 11/29/17  9:00 PM   Result Value Ref Range    Color YELLOW      Appearance CLEAR      Specific gravity 1.012 1.005 - 1.030      pH (UA) 5.0 5.0 - 8.0      Protein NEGATIVE  NEG mg/dL    Glucose NEGATIVE  NEG mg/dL    Ketone NEGATIVE  NEG mg/dL    Bilirubin NEGATIVE  NEG      Blood NEGATIVE  NEG      Urobilinogen 0.2 0.2 - 1.0 EU/dL    Nitrites NEGATIVE  NEG      Leukocyte Esterase NEGATIVE  NEG         Radiologic Studies -   9:42 PM  RADIOLOGY FINDINGS  chest X-ray shows NAP  Pending review by Radiologist  Recorded by Travis Tam ED Scribe, as dictated by Nicholas Snow MD   XR CHEST PORT    (Results Pending)     CT Results  (Last 48 hours)    None        CXR Results  (Last 48 hours)    None          Medications given in the ED-  Medications   famotidine (PF) (PEPCID) injection 20 mg (20 mg IntraVENous Given 11/29/17 1850)   GI COCKTAIL [facility]) (30 mL Oral Given 11/29/17 1850)         Medical Decision Making   I am the first provider for this patient. I reviewed the vital signs, available nursing notes, past medical history, past surgical history, family history and social history. Ddx:ACS, PE, GERD, colic     Vital Signs-Reviewed the patient's vital signs. Pulse Oximetry Analysis - 100% on RA     Cardiac Monitor:  Rate: 61 bpm  Rhythm: NSR    EKG interpretation: (Preliminary)  6:09 PM   Rate 61 bpm. NSR. LVH. No acute changes. EKG read by Nicholas Snow MD at 17:51     EKG interpretation: (secondary)  8:57 PM   Rate 57 bpm. Sinus hayley. LVH. No acute changes. EKG read by Nicholas Snow MD  at 20:45     Result Impression   Unremarkable stress and rest myocardial imaging study.  Please correlate  with the stress test results.     INTERPRETING PHYSICIAN: MD BRITTNEY Campuzano//Report ID: [id number]  Creation Date: 02/07/17 12:20 pm  ELECTRONICALLY SIGNED BY: Jorge Chong MD  Date Signed: 02/07/17 12:22 pm   Result Narrative     [id number]             2/7/2017 11:56:17 AM      RESULT  EXAM DESCRIPTION:  NM MULTI MYOCARD PERF    COMPLETED DATE/TIME:  2/7/2017 11:56 am    REASON FOR EXAM:  Chest pain    TECHNIQUE:  A resting exam was initially performed with 10 mCi Tc 99m sestamibi,  followed by gated SPECT imaging of the heart.  The patient then underwent  pharmacologic stress with Lexiscan, with reinjection of 30 mCi Tc 99m  sestamibi, followed by additional gated SPECT imaging of the heart. REPORT:  There is uniform uptake of activity throughout the left ventricular wall  and septum.  There is no evidence of a discrete reversible defect. Review of the cine images demonstrates no gross left ventricular wall  motion abnormalities.  The left ventricular ejection fraction is calculated  to be 62%. The lung - heart ratio is 0.34 (normal </= 0.45).   The TID is  1.0 (normal </= 1.22). Status Results Details       Procedures:  Procedures    ED Course:   6:09 PM Initial assessment performed. The patients presenting problems have been discussed, and they are in agreement with the care plan formulated and outlined with them. I have encouraged them to ask questions as they arise throughout their visit.    7:07 PM Pt's CP got better with GI cocktail. 7:39 PM Pt is feeling better now. 8:11 PM Discussed patient's history, exam, and available diagnostics results with Guilherme Dalal MD, hospitalist, who thinks this is GI related. He recommends 2nd set of cardiac enzymes, if negative pt can be discharged if not then she can be admitted. Diagnosis and Disposition     Discussion:  Patient was stable in the ED. Serial ECGs and Troponins showed no evidence of ACS. Age adjusted d-dimer unremarkable, doubt PE. CXR unremarkable. Patient's chest discomfort resolved after Pepcid and GI cocktail. Labs unremarkable. Prior records reviewed and Nuclear Medicine cardiac scan unremarkable 2/2017. Patient will follow-up with PCP and outpatient cardiac evaluation. Patient was given prescription for Mylanta.     DISCHARGE NOTE:  9:42 PM  Artist Cayla Latosha's  results have been reviewed with her. She has been counseled regarding her diagnosis, treatment, and plan. She verbally conveys understanding and agreement of the signs, symptoms, diagnosis, treatment and prognosis and additionally agrees to follow up as discussed. She also agrees with the care-plan and conveys that all of her questions have been answered. I have also provided discharge instructions for her that include: educational information regarding their diagnosis and treatment, and list of reasons why they would want to return to the ED prior to their follow-up appointment, should her condition change. She has been provided with education for proper emergency department utilization. CLINICAL IMPRESSION:    1. Chest pain, unspecified type    2. Gastroesophageal reflux disease without esophagitis        PLAN:  1. D/C Home  2. Current Discharge Medication List      START taking these medications    Details   alum-mag hydroxide-simeth (MYLANTA) 200-200-20 mg/5 mL susp Take 30 mL by mouth every four (4) hours as needed. Qty: 300 mL, Refills: 0           3. Follow-up Information     Follow up With Details Comments Contact Info    Nava Horner MD Schedule an appointment as soon as possible for a visit in 2 days  [address] [facility] EMERGENCY DEPT  As needed, If symptoms worsen [address] [place] [id number]  [phone]        _______________________________    Attestations: This note is prepared by Robb Cooper , acting as Scribe for Heavenly Bustamante MD.    Heavenly Bustamante MD:  The scribe's documentation has been prepared under my direction and personally reviewed by me in its entirety.   I confirm that the note above accurately reflects all work, treatment, procedures, and medical decision making performed by me.  _______________________________

## 2017-11-30 NOTE — ED NOTES
Pt stable. NO signs of distress. NO complaints at this time. VS stable. Pt discharged home. No questions at this time. I have reviewed discharge instructions with the patient. The patient verbalized understanding.

## 2017-12-03 LAB
ATRIAL RATE: 57 BPM
ATRIAL RATE: 61 BPM
CALCULATED P AXIS, ECG09: 45 DEGREES
CALCULATED P AXIS, ECG09: 49 DEGREES
CALCULATED R AXIS, ECG10: -10 DEGREES
CALCULATED R AXIS, ECG10: -16 DEGREES
CALCULATED T AXIS, ECG11: 13 DEGREES
CALCULATED T AXIS, ECG11: 22 DEGREES
DIAGNOSIS, 93000: NORMAL
DIAGNOSIS, 93000: NORMAL
P-R INTERVAL, ECG05: 166 MS
P-R INTERVAL, ECG05: 168 MS
Q-T INTERVAL, ECG07: 422 MS
Q-T INTERVAL, ECG07: 442 MS
QRS DURATION, ECG06: 82 MS
QRS DURATION, ECG06: 88 MS
QTC CALCULATION (BEZET), ECG08: 424 MS
QTC CALCULATION (BEZET), ECG08: 430 MS
VENTRICULAR RATE, ECG03: 57 BPM
VENTRICULAR RATE, ECG03: 61 BPM

## 2017-12-19 ENCOUNTER — HOSPITAL ENCOUNTER (EMERGENCY)
Age: 82
Discharge: HOME | End: 2017-12-19
Attending: EMERGENCY MEDICINE
Payer: MEDICARE

## 2017-12-19 ENCOUNTER — APPOINTMENT (OUTPATIENT)
Dept: GENERAL RADIOLOGY | Age: 82
End: 2017-12-19
Attending: EMERGENCY MEDICINE
Payer: MEDICARE

## 2017-12-19 VITALS
DIASTOLIC BLOOD PRESSURE: 78 MMHG | HEIGHT: 63 IN | BODY MASS INDEX: 42.17 KG/M2 | OXYGEN SATURATION: 100 % | RESPIRATION RATE: 20 BRPM | TEMPERATURE: 98.6 F | WEIGHT: 238 LBS | HEART RATE: 59 BPM | SYSTOLIC BLOOD PRESSURE: 161 MMHG

## 2017-12-19 DIAGNOSIS — M54.6 CHRONIC LEFT-SIDED THORACIC BACK PAIN: Primary | ICD-10-CM

## 2017-12-19 DIAGNOSIS — G89.29 CHRONIC LEFT-SIDED THORACIC BACK PAIN: Primary | ICD-10-CM

## 2017-12-19 LAB
ALBUMIN SERPL-MCNC: 3.1 G/DL (ref 3.4–5)
ALBUMIN/GLOB SERPL: 0.7 {RATIO} (ref 0.8–1.7)
ALP SERPL-CCNC: 100 U/L (ref 45–117)
ALT SERPL-CCNC: 27 U/L (ref 13–56)
ANION GAP SERPL CALC-SCNC: 12 MMOL/L (ref 3–18)
APPEARANCE UR: CLEAR
AST SERPL-CCNC: 29 U/L (ref 15–37)
ATRIAL RATE: 59 BPM
BACTERIA URNS QL MICRO: ABNORMAL /HPF
BASOPHILS # BLD: 0 K/UL (ref 0–0.06)
BASOPHILS NFR BLD: 0 % (ref 0–2)
BILIRUB SERPL-MCNC: 0.3 MG/DL (ref 0.2–1)
BILIRUB UR QL: NEGATIVE
BUN SERPL-MCNC: 19 MG/DL (ref 7–18)
BUN/CREAT SERPL: 20 (ref 12–20)
CALCIUM SERPL-MCNC: 8.9 MG/DL (ref 8.5–10.1)
CALCULATED P AXIS, ECG09: 54 DEGREES
CALCULATED R AXIS, ECG10: -10 DEGREES
CALCULATED T AXIS, ECG11: 16 DEGREES
CHLORIDE SERPL-SCNC: 109 MMOL/L (ref 100–108)
CK MB CFR SERPL CALC: 0.7 % (ref 0–4)
CK MB SERPL-MCNC: 1 NG/ML (ref 5–25)
CK SERPL-CCNC: 141 U/L (ref 26–192)
CO2 SERPL-SCNC: 24 MMOL/L (ref 21–32)
COLOR UR: YELLOW
CREAT SERPL-MCNC: 0.96 MG/DL (ref 0.6–1.3)
DIAGNOSIS, 93000: NORMAL
DIFFERENTIAL METHOD BLD: ABNORMAL
EOSINOPHIL # BLD: 0.2 K/UL (ref 0–0.4)
EOSINOPHIL NFR BLD: 2 % (ref 0–5)
EPITH CASTS URNS QL MICRO: ABNORMAL /LPF (ref 0–5)
ERYTHROCYTE [DISTWIDTH] IN BLOOD BY AUTOMATED COUNT: 14.1 % (ref 11.6–14.5)
GLOBULIN SER CALC-MCNC: 4.5 G/DL (ref 2–4)
GLUCOSE SERPL-MCNC: 113 MG/DL (ref 74–99)
GLUCOSE UR STRIP.AUTO-MCNC: NEGATIVE MG/DL
HCT VFR BLD AUTO: 35 % (ref 35–45)
HGB BLD-MCNC: 11.9 G/DL (ref 12–16)
HGB UR QL STRIP: NEGATIVE
KETONES UR QL STRIP.AUTO: NEGATIVE MG/DL
LEUKOCYTE ESTERASE UR QL STRIP.AUTO: ABNORMAL
LYMPHOCYTES # BLD: 2.5 K/UL (ref 0.9–3.6)
LYMPHOCYTES NFR BLD: 23 % (ref 21–52)
MCH RBC QN AUTO: 30.5 PG (ref 24–34)
MCHC RBC AUTO-ENTMCNC: 34 G/DL (ref 31–37)
MCV RBC AUTO: 89.7 FL (ref 74–97)
MONOCYTES # BLD: 0.7 K/UL (ref 0.05–1.2)
MONOCYTES NFR BLD: 6 % (ref 3–10)
NEUTS SEG # BLD: 7.7 K/UL (ref 1.8–8)
NEUTS SEG NFR BLD: 69 % (ref 40–73)
NITRITE UR QL STRIP.AUTO: NEGATIVE
P-R INTERVAL, ECG05: 170 MS
PH UR STRIP: 5 [PH] (ref 5–8)
PLATELET # BLD AUTO: 354 K/UL (ref 135–420)
PMV BLD AUTO: 10 FL (ref 9.2–11.8)
POTASSIUM SERPL-SCNC: 4.1 MMOL/L (ref 3.5–5.5)
PROT SERPL-MCNC: 7.6 G/DL (ref 6.4–8.2)
PROT UR STRIP-MCNC: NEGATIVE MG/DL
Q-T INTERVAL, ECG07: 432 MS
QRS DURATION, ECG06: 96 MS
QTC CALCULATION (BEZET), ECG08: 427 MS
RBC # BLD AUTO: 3.9 M/UL (ref 4.2–5.3)
RBC #/AREA URNS HPF: ABNORMAL /HPF (ref 0–5)
SODIUM SERPL-SCNC: 145 MMOL/L (ref 136–145)
SP GR UR REFRACTOMETRY: 1.02 (ref 1–1.03)
TROPONIN I SERPL-MCNC: 0.04 NG/ML (ref 0–0.06)
UROBILINOGEN UR QL STRIP.AUTO: 0.2 EU/DL (ref 0.2–1)
VENTRICULAR RATE, ECG03: 59 BPM
WBC # BLD AUTO: 11.2 K/UL (ref 4.6–13.2)
WBC URNS QL MICRO: ABNORMAL /HPF (ref 0–5)

## 2017-12-19 PROCEDURE — 74011250636 HC RX REV CODE- 250/636: Performed by: EMERGENCY MEDICINE

## 2017-12-19 PROCEDURE — 82550 ASSAY OF CK (CPK): CPT | Performed by: EMERGENCY MEDICINE

## 2017-12-19 PROCEDURE — 81001 URINALYSIS AUTO W/SCOPE: CPT | Performed by: EMERGENCY MEDICINE

## 2017-12-19 PROCEDURE — 85025 COMPLETE CBC W/AUTO DIFF WBC: CPT | Performed by: EMERGENCY MEDICINE

## 2017-12-19 PROCEDURE — 74011250637 HC RX REV CODE- 250/637: Performed by: EMERGENCY MEDICINE

## 2017-12-19 PROCEDURE — 99284 EMERGENCY DEPT VISIT MOD MDM: CPT

## 2017-12-19 PROCEDURE — 71020 XR CHEST PA LAT: CPT

## 2017-12-19 PROCEDURE — 93005 ELECTROCARDIOGRAM TRACING: CPT

## 2017-12-19 PROCEDURE — 96372 THER/PROPH/DIAG INJ SC/IM: CPT

## 2017-12-19 PROCEDURE — 80053 COMPREHEN METABOLIC PANEL: CPT | Performed by: EMERGENCY MEDICINE

## 2017-12-19 RX ORDER — LIDOCAINE 50 MG/G
PATCH TOPICAL
Qty: 15 EACH | Refills: 0 | Status: SHIPPED | OUTPATIENT
Start: 2017-12-20 | End: 2018-06-04

## 2017-12-19 RX ORDER — LIDOCAINE 50 MG/G
1 PATCH TOPICAL EVERY 24 HOURS
Status: DISCONTINUED | OUTPATIENT
Start: 2017-12-19 | End: 2017-12-19 | Stop reason: HOSPADM

## 2017-12-19 RX ORDER — KETOROLAC TROMETHAMINE 30 MG/ML
15 INJECTION, SOLUTION INTRAMUSCULAR; INTRAVENOUS ONCE
Status: COMPLETED | OUTPATIENT
Start: 2017-12-19 | End: 2017-12-19

## 2017-12-19 RX ORDER — ACETAMINOPHEN 325 MG/1
975 TABLET ORAL
Status: COMPLETED | OUTPATIENT
Start: 2017-12-19 | End: 2017-12-19

## 2017-12-19 RX ORDER — ACETAMINOPHEN 500 MG
1000 TABLET ORAL
Qty: 20 TAB | Refills: 0 | Status: SHIPPED | OUTPATIENT
Start: 2017-12-19 | End: 2019-01-13

## 2017-12-19 RX ADMIN — ACETAMINOPHEN 975 MG: 325 TABLET ORAL at 09:23

## 2017-12-19 RX ADMIN — KETOROLAC TROMETHAMINE 15 MG: 30 INJECTION, SOLUTION INTRAMUSCULAR at 10:53

## 2017-12-19 NOTE — ED PROVIDER NOTES
EMERGENCY DEPARTMENT HISTORY AND PHYSICAL EXAM    Date: 12/19/2017  Patient Name: Kaitlin Wagner    History of Presenting Illness     Chief Complaint   Patient presents with    Back Pain       History Provided By: Patient    Chief Complaint: flank pain  Duration: 1 month  Timing:  Intermittent  Location: left mid back/flank  Quality: Sharp  Severity: Moderate  Modifying Factors: Pain is worse with movement. Associated Symptoms: nausea, diarrhea    Additional History (Context):   9:05 AM  Kaitlin Wagner is a 80 y.o. female with PMHX HTN,  who presents to the emergency department C/O intermittent left mid back/flank onset one month ago that worsened in the last 12 hours. Pt could not sleep last night due to pain. Pain is worse with movement. Associated sxs include nausea, and diarrhea. Pt confirms chronic chest pain and SOB that is unchanged from baseline. Pt denies rash, falls, hematuria, urinal incontinence, dysuria, and any other sxs or complaints. PCP: Verona Carlos MD    Current Facility-Administered Medications   Medication Dose Route Frequency Provider Last Rate Last Dose    lidocaine (LIDODERM) 5 % patch 1 Patch  1 Patch TransDERmal Q24H Jan MD Refugio   1 Patch at 12/19/17 4186     Current Outpatient Prescriptions   Medication Sig Dispense Refill    [START ON 12/20/2017] lidocaine (LIDODERM) 5 % Apply patch to the affected area for 12 hours a day and remove for 12 hours a day. 15 Each 0    acetaminophen (TYLENOL EXTRA STRENGTH) 500 mg tablet Take 2 Tabs by mouth every six (6) hours as needed for Pain. 20 Tab 0    alum-mag hydroxide-simeth (MYLANTA) 200-200-20 mg/5 mL susp Take 30 mL by mouth every four (4) hours as needed. 300 mL 0    Dexlansoprazole (DEXILANT) 60 mg CpDB Take 60 mg by mouth as needed.  naproxen sodium (ALEVE) 220 mg cap Take  by mouth.  valsartan-hydrochlorothiazide (DIOVAN HCT) 160-25 mg per tablet Take 1 Tab by mouth daily.         potassium chloride (KLOR-CON) 20 mEq packet Take 20 mEq by mouth two (2) times a day.  dipyridamole-aspirin (AGGRENOX) 200-25 mg per SR capsule Take 1 Cap by mouth daily.  calcium carbonate (TUMS) 200 mg calcium (500 mg) Chew Take 1 Tab by mouth as needed.  multivitamin (ONE A DAY) tablet Take 1 Tab by mouth daily. Past History     Past Medical History:  Past Medical History:   Diagnosis Date    Arthritis     osteo    Asthma     inhaler as needed - every 2 to 3 years    Chronic pain     knee and hip    GERD (gastroesophageal reflux disease)     Hypertension     5yrs    Irritable bowel     Other ill-defined conditions(799.89)     h/o migraines    Other ill-defined conditions(799.89)     h/o dizzy    Other ill-defined conditions(799.89)     IBS; hiatal hernia    Stroke ([facility] [address] 9 years ago     stroke - no deficits    Stroke ([facility] [address]     In 1997/98       Past Surgical History:  Past Surgical History:   Procedure Laterality Date    HX CHOLECYSTECTOMY      HX HERNIA REPAIR      umbilical twice    HX INTRAOCULAR LENS PROSTHESIS      HX KNEE ARTHROSCOPY      left    HX OTHER SURGICAL      herniated disc surgery    HX TUBAL LIGATION      LAP UMBILICAL HERNIA REPAIR      LAP,CHOLECYSTECTOMY         Family History:  Family History   Problem Relation Age of Onset    Malignant Hyperthermia Neg Hx     Pseudocholinesterase Deficiency Neg Hx     Post-op Nausea/Vomiting Neg Hx     Delayed Awakening Neg Hx     Emergence Delirium Neg Hx     Post-op Cognitive Dysfunction Neg Hx     Other Neg Hx        Social History:  Social History   Substance Use Topics    Smoking status: Never Smoker    Smokeless tobacco: Never Used    Alcohol use No       Allergies:  No Known Allergies      Review of Systems     Review of Systems   Gastrointestinal: Positive for diarrhea and nausea. Genitourinary: Positive for flank pain (left). Negative for hematuria. Musculoskeletal: Positive for back pain (left).    Skin: Negative for rash. All other systems reviewed and are negative. Physical Exam     Vitals:    12/19/17 0908   BP: 161/78   Pulse: (!) 59   Resp: 20   Temp: 98.6 °F (37 °C)   SpO2: 100%   Weight: 108 kg (238 lb)   Height: 5' 3\" (1.6 m)     Physical Exam   Nursing note and vitals reviewed. Constitutional: Well appearing, no acute distress, Elderly and obese. Head: Normocephalic, Atraumatic  Eyes: EOMI  Neck: Supple, non-tender  Cardiovascular: Regular rate and rhythm, no murmurs, rubs, or gallops  Chest: Normal work of breathing and chest excursion bilaterally  Lungs: Clear to ausculation bilaterally  Abdomen: Soft, non-tender, non distended, normoactive bowel sounds  Back: Tender over left thoracic paraspinal muscles, no midline tenderness  Extremities: No evidence of trauma or deformity  Skin: Warm and dry, normal cap refill, no lesions on the skin.   Neuro: Alert and appropriate  Psychiatric: Normal mood and affect    Diagnostic Study Results     Labs -     Recent Results (from the past 12 hour(s))   EKG, 12 LEAD, INITIAL    Collection Time: 12/19/17  9:19 AM   Result Value Ref Range    Ventricular Rate 59 BPM    Atrial Rate 59 BPM    P-R Interval 170 ms    QRS Duration 96 ms    Q-T Interval 432 ms    QTC Calculation (Bezet) 427 ms    Calculated P Axis 54 degrees    Calculated R Axis -10 degrees    Calculated T Axis 16 degrees    Diagnosis       Sinus bradycardia  Voltage criteria for left ventricular hypertrophy  Abnormal ECG  When compared with ECG of 29-NOV-2017 20:45,  No significant change was found     CBC WITH AUTOMATED DIFF    Collection Time: 12/19/17  9:24 AM   Result Value Ref Range    WBC 11.2 4.6 - 13.2 K/uL    RBC 3.90 (L) 4.20 - 5.30 M/uL    HGB 11.9 (L) 12.0 - 16.0 g/dL    HCT 35.0 35.0 - 45.0 %    MCV 89.7 74.0 - 97.0 FL    MCH 30.5 24.0 - 34.0 PG    MCHC 34.0 31.0 - 37.0 g/dL    RDW 14.1 11.6 - 14.5 %    PLATELET 919 733 - 004 K/uL    MPV 10.0 9.2 - 11.8 FL    NEUTROPHILS 69 40 - 73 % LYMPHOCYTES 23 21 - 52 %    MONOCYTES 6 3 - 10 %    EOSINOPHILS 2 0 - 5 %    BASOPHILS 0 0 - 2 %    ABS. NEUTROPHILS 7.7 1.8 - 8.0 K/UL    ABS. LYMPHOCYTES 2.5 0.9 - 3.6 K/UL    ABS. MONOCYTES 0.7 0.05 - 1.2 K/UL    ABS. EOSINOPHILS 0.2 0.0 - 0.4 K/UL    ABS. BASOPHILS 0.0 0.0 - 0.06 K/UL    DF AUTOMATED     METABOLIC PANEL, COMPREHENSIVE    Collection Time: 12/19/17  9:24 AM   Result Value Ref Range    Sodium 145 136 - 145 mmol/L    Potassium 4.1 3.5 - 5.5 mmol/L    Chloride 109 (H) 100 - 108 mmol/L    CO2 24 21 - 32 mmol/L    Anion gap 12 3.0 - 18 mmol/L    Glucose 113 (H) 74 - 99 mg/dL    BUN 19 (H) 7.0 - 18 MG/DL    Creatinine 0.96 0.6 - 1.3 MG/DL    BUN/Creatinine ratio 20 12 - 20      GFR est AA >60 >60 ml/min/1.73m2    GFR est non-AA 56 (L) >60 ml/min/1.73m2    Calcium 8.9 8.5 - 10.1 MG/DL    Bilirubin, total 0.3 0.2 - 1.0 MG/DL    ALT (SGPT) 27 13 - 56 U/L    AST (SGOT) 29 15 - 37 U/L    Alk.  phosphatase 100 45 - 117 U/L    Protein, total 7.6 6.4 - 8.2 g/dL    Albumin 3.1 (L) 3.4 - 5.0 g/dL    Globulin 4.5 (H) 2.0 - 4.0 g/dL    A-G Ratio 0.7 (L) 0.8 - 1.7     CARDIAC PANEL,(CK, CKMB & TROPONIN)    Collection Time: 12/19/17  9:24 AM   Result Value Ref Range     26 - 192 U/L    CK - MB 1.0 <3.6 ng/ml    CK-MB Index 0.7 0.0 - 4.0 %    Troponin-I, Qt. 0.04 0.00 - 0.06 NG/ML   URINALYSIS W/ RFLX MICROSCOPIC    Collection Time: 12/19/17 10:15 AM   Result Value Ref Range    Color YELLOW      Appearance CLEAR      Specific gravity 1.023 1.005 - 1.030      pH (UA) 5.0 5.0 - 8.0      Protein NEGATIVE  NEG mg/dL    Glucose NEGATIVE  NEG mg/dL    Ketone NEGATIVE  NEG mg/dL    Bilirubin NEGATIVE  NEG      Blood NEGATIVE  NEG      Urobilinogen 0.2 0.2 - 1.0 EU/dL    Nitrites NEGATIVE  NEG      Leukocyte Esterase SMALL (A) NEG     URINE MICROSCOPIC ONLY    Collection Time: 12/19/17 10:15 AM   Result Value Ref Range    WBC 4 to 10 0 - 5 /hpf    RBC 0 to 2 0 - 5 /hpf    Epithelial cells 3+ 0 - 5 /lpf    Bacteria 1+ (A) NEG /hpf       Radiologic Studies -     10:54 AM  RADIOLOGY FINDINGS  Chest X-ray shows no acute abnormality  Pending review by Radiologist  Recorded by Sapna Henson ED Scribe, as dictated by Sameera Riley MD     XR CHEST PA LAT    (Results Pending)      CT Results  (Last 48 hours)    None        CXR Results  (Last 48 hours)    None          Medications given in the ED-  Medications   lidocaine (LIDODERM) 5 % patch 1 Patch (1 Patch TransDERmal Apply Patch 12/19/17 0923)   acetaminophen (TYLENOL) tablet 975 mg (975 mg Oral Given 12/19/17 0923)   ketorolac (TORADOL) injection 15 mg (15 mg IntraMUSCular Given 12/19/17 1053)         Medical Decision Making   I am the first provider for this patient. I reviewed the vital signs, available nursing notes, past medical history, past surgical history, family history and social history. Records Reviewed: Nursing Notes    Vital Signs-Reviewed the patient's vital signs. Pulse Oximetry Analysis - Normal 100% on RA. EKG interpretation: (Preliminary)  9:19 AM   59 BPM, Sinus bradycardia, QRS duration 96 ms  EKG read by Sameera Riley MD at 9:21 AM.    Provider Notes (Medical Decision Making):     Procedures:  Procedures    ED Course:   9:05 AM Initial assessment performed. The patients presenting problems have been discussed, and they are in agreement with the care plan formulated and outlined with them. I have encouraged them to ask questions as they arise throughout their visit. 10:51 AM Even though her UA has 4-10 white blood cells, but 3+ EPSU and therefore is contaminated. As patient does not have urinary symptoms, will not initiate treatment at this time. Diagnosis and Disposition       DISCHARGE NOTE:  10:58 AM  Gray Reina's  results have been reviewed with her. She has been counseled regarding her diagnosis, treatment, and plan.   She verbally conveys understanding and agreement of the signs, symptoms, diagnosis, treatment and prognosis and additionally agrees to follow up as discussed. She also agrees with the care-plan and conveys that all of her questions have been answered. I have also provided discharge instructions for her that include: educational information regarding their diagnosis and treatment, and list of reasons why they would want to return to the ED prior to their follow-up appointment, should her condition change. She has been provided with education for proper emergency department utilization. Discussion: 80year old presenting with left mid back pain. No acute abnormality on labs, EKG, and imaging. Plan for symptom management and discharge with return precautions and follow up with PCP. Pt understands and agrees with this plan. CLINICAL IMPRESSION:    1. Chronic left-sided thoracic back pain        PLAN:  1. D/C Home  2. Current Discharge Medication List      START taking these medications    Details   lidocaine (LIDODERM) 5 % Apply patch to the affected area for 12 hours a day and remove for 12 hours a day. Qty: 15 Each, Refills: 0      acetaminophen (TYLENOL EXTRA STRENGTH) 500 mg tablet Take 2 Tabs by mouth every six (6) hours as needed for Pain. Qty: 20 Tab, Refills: 0           3. Follow-up Information     Follow up With Details Comments Contact Info    Tank Howell MD Schedule an appointment as soon as possible for a visit in 2 days For primary care follow up [address]      [facility] EMERGENCY DEPT Go to As needed, if symptoms worsen [address] [address]  [phone]        _______________________________    Attestations: This note is prepared by Eladia Lisa, acting as Scribe for Lucia Nelson MD.    Lucia Nelson MD:  The scribe's documentation has been prepared under my direction and personally reviewed by me in its entirety.   I confirm that the note above accurately reflects all work, treatment, procedures, and medical decision making performed by me.  _______________________________

## 2017-12-19 NOTE — DISCHARGE INSTRUCTIONS

## 2017-12-19 NOTE — ED TRIAGE NOTES
Patient with with complaints of left mid back pain that started about 1 month ago and got worse yesterday. Sepsis Screening completed    (  )Patient meets SIRS criteria. (x )Patient does not meet SIRS criteria.       SIRS Criteria is achieved when two or more of the following are present   Temperature < 96.8°F (36°C) or > 100.9°F (38.3°C)   Heart Rate > 90 beats per minute   Respiratory Rate > 20 breaths per minute   WBC count > 12,000 or <4,000 or > 10% bands

## 2018-03-24 ENCOUNTER — APPOINTMENT (OUTPATIENT)
Dept: GENERAL RADIOLOGY | Age: 83
End: 2018-03-24
Attending: INTERNAL MEDICINE
Payer: MEDICARE

## 2018-03-24 ENCOUNTER — HOSPITAL ENCOUNTER (EMERGENCY)
Age: 83
Discharge: HOME | End: 2018-03-24
Attending: INTERNAL MEDICINE | Admitting: INTERNAL MEDICINE
Payer: MEDICARE

## 2018-03-24 VITALS
DIASTOLIC BLOOD PRESSURE: 63 MMHG | HEIGHT: 64 IN | HEART RATE: 55 BPM | RESPIRATION RATE: 18 BRPM | OXYGEN SATURATION: 99 % | TEMPERATURE: 97.8 F | SYSTOLIC BLOOD PRESSURE: 145 MMHG | WEIGHT: 235 LBS | BODY MASS INDEX: 40.12 KG/M2

## 2018-03-24 DIAGNOSIS — R51.9 ACUTE NONINTRACTABLE HEADACHE, UNSPECIFIED HEADACHE TYPE: ICD-10-CM

## 2018-03-24 DIAGNOSIS — R07.89 ATYPICAL CHEST PAIN: Primary | ICD-10-CM

## 2018-03-24 LAB
ALBUMIN SERPL-MCNC: 3.2 G/DL (ref 3.4–5)
ALBUMIN/GLOB SERPL: 0.7 {RATIO} (ref 0.8–1.7)
ALP SERPL-CCNC: 107 U/L (ref 45–117)
ALT SERPL-CCNC: 23 U/L (ref 13–56)
ANION GAP SERPL CALC-SCNC: 11 MMOL/L (ref 3–18)
APPEARANCE UR: CLEAR
AST SERPL-CCNC: 18 U/L (ref 15–37)
BACTERIA URNS QL MICRO: NEGATIVE /HPF
BASOPHILS # BLD: 0 K/UL (ref 0–0.06)
BASOPHILS NFR BLD: 0 % (ref 0–2)
BILIRUB SERPL-MCNC: 0.4 MG/DL (ref 0.2–1)
BILIRUB UR QL: NEGATIVE
BNP SERPL-MCNC: 54 PG/ML (ref 0–1800)
BUN SERPL-MCNC: 19 MG/DL (ref 7–18)
BUN/CREAT SERPL: 18 (ref 12–20)
CALCIUM SERPL-MCNC: 8.6 MG/DL (ref 8.5–10.1)
CHLORIDE SERPL-SCNC: 108 MMOL/L (ref 100–108)
CK MB CFR SERPL CALC: 1.4 % (ref 0–4)
CK MB CFR SERPL CALC: NORMAL % (ref 0–4)
CK MB SERPL-MCNC: 1 NG/ML (ref 5–25)
CK MB SERPL-MCNC: <1 NG/ML (ref 5–25)
CK SERPL-CCNC: 69 U/L (ref 26–192)
CK SERPL-CCNC: 73 U/L (ref 26–192)
CO2 SERPL-SCNC: 26 MMOL/L (ref 21–32)
COLOR UR: YELLOW
CREAT SERPL-MCNC: 1.08 MG/DL (ref 0.6–1.3)
DIFFERENTIAL METHOD BLD: ABNORMAL
EOSINOPHIL # BLD: 0.2 K/UL (ref 0–0.4)
EOSINOPHIL NFR BLD: 2 % (ref 0–5)
EPITH CASTS URNS QL MICRO: NORMAL /LPF (ref 0–5)
ERYTHROCYTE [DISTWIDTH] IN BLOOD BY AUTOMATED COUNT: 15 % (ref 11.6–14.5)
GLOBULIN SER CALC-MCNC: 4.5 G/DL (ref 2–4)
GLUCOSE SERPL-MCNC: 91 MG/DL (ref 74–99)
GLUCOSE UR STRIP.AUTO-MCNC: NEGATIVE MG/DL
HCT VFR BLD AUTO: 37.5 % (ref 35–45)
HGB BLD-MCNC: 12.2 G/DL (ref 12–16)
HGB UR QL STRIP: NEGATIVE
KETONES UR QL STRIP.AUTO: NEGATIVE MG/DL
LEUKOCYTE ESTERASE UR QL STRIP.AUTO: ABNORMAL
LIPASE SERPL-CCNC: 66 U/L (ref 73–393)
LYMPHOCYTES # BLD: 2.8 K/UL (ref 0.9–3.6)
LYMPHOCYTES NFR BLD: 20 % (ref 21–52)
MCH RBC QN AUTO: 29.6 PG (ref 24–34)
MCHC RBC AUTO-ENTMCNC: 32.5 G/DL (ref 31–37)
MCV RBC AUTO: 91 FL (ref 74–97)
MONOCYTES # BLD: 1 K/UL (ref 0.05–1.2)
MONOCYTES NFR BLD: 7 % (ref 3–10)
NEUTS SEG # BLD: 9.7 K/UL (ref 1.8–8)
NEUTS SEG NFR BLD: 71 % (ref 40–73)
NITRITE UR QL STRIP.AUTO: NEGATIVE
PH UR STRIP: 5 [PH] (ref 5–8)
PLATELET # BLD AUTO: 350 K/UL (ref 135–420)
PMV BLD AUTO: 9.6 FL (ref 9.2–11.8)
POTASSIUM SERPL-SCNC: 4.3 MMOL/L (ref 3.5–5.5)
PROT SERPL-MCNC: 7.7 G/DL (ref 6.4–8.2)
PROT UR STRIP-MCNC: NEGATIVE MG/DL
RBC # BLD AUTO: 4.12 M/UL (ref 4.2–5.3)
RBC #/AREA URNS HPF: NEGATIVE /HPF (ref 0–5)
SODIUM SERPL-SCNC: 145 MMOL/L (ref 136–145)
SP GR UR REFRACTOMETRY: 1.01 (ref 1–1.03)
TROPONIN I SERPL-MCNC: <0.02 NG/ML (ref 0–0.06)
TROPONIN I SERPL-MCNC: <0.02 NG/ML (ref 0–0.06)
UROBILINOGEN UR QL STRIP.AUTO: 0.2 EU/DL (ref 0.2–1)
WBC # BLD AUTO: 13.7 K/UL (ref 4.6–13.2)
WBC URNS QL MICRO: NORMAL /HPF (ref 0–5)

## 2018-03-24 PROCEDURE — 83690 ASSAY OF LIPASE: CPT | Performed by: INTERNAL MEDICINE

## 2018-03-24 PROCEDURE — 93005 ELECTROCARDIOGRAM TRACING: CPT

## 2018-03-24 PROCEDURE — 96365 THER/PROPH/DIAG IV INF INIT: CPT

## 2018-03-24 PROCEDURE — 83880 ASSAY OF NATRIURETIC PEPTIDE: CPT | Performed by: INTERNAL MEDICINE

## 2018-03-24 PROCEDURE — 94762 N-INVAS EAR/PLS OXIMTRY CONT: CPT

## 2018-03-24 PROCEDURE — 99285 EMERGENCY DEPT VISIT HI MDM: CPT

## 2018-03-24 PROCEDURE — 74011000250 HC RX REV CODE- 250: Performed by: PHYSICIAN ASSISTANT

## 2018-03-24 PROCEDURE — 71045 X-RAY EXAM CHEST 1 VIEW: CPT

## 2018-03-24 PROCEDURE — 85025 COMPLETE CBC W/AUTO DIFF WBC: CPT | Performed by: PHYSICIAN ASSISTANT

## 2018-03-24 PROCEDURE — 96375 TX/PRO/DX INJ NEW DRUG ADDON: CPT

## 2018-03-24 PROCEDURE — 74011250636 HC RX REV CODE- 250/636: Performed by: PHYSICIAN ASSISTANT

## 2018-03-24 PROCEDURE — 81001 URINALYSIS AUTO W/SCOPE: CPT | Performed by: PHYSICIAN ASSISTANT

## 2018-03-24 PROCEDURE — 80053 COMPREHEN METABOLIC PANEL: CPT | Performed by: INTERNAL MEDICINE

## 2018-03-24 PROCEDURE — 74011250637 HC RX REV CODE- 250/637: Performed by: PHYSICIAN ASSISTANT

## 2018-03-24 PROCEDURE — 82550 ASSAY OF CK (CPK): CPT | Performed by: INTERNAL MEDICINE

## 2018-03-24 RX ORDER — ACETAMINOPHEN 10 MG/ML
1000 INJECTION, SOLUTION INTRAVENOUS ONCE
Status: COMPLETED | OUTPATIENT
Start: 2018-03-24 | End: 2018-03-24

## 2018-03-24 RX ORDER — FAMOTIDINE 10 MG/ML
20 INJECTION INTRAVENOUS
Status: COMPLETED | OUTPATIENT
Start: 2018-03-24 | End: 2018-03-24

## 2018-03-24 RX ADMIN — Medication 30 ML: at 18:59

## 2018-03-24 RX ADMIN — ACETAMINOPHEN 1000 MG: 10 INJECTION, SOLUTION INTRAVENOUS at 18:59

## 2018-03-24 RX ADMIN — FAMOTIDINE 20 MG: 10 INJECTION, SOLUTION INTRAVENOUS at 17:31

## 2018-03-24 NOTE — ED PROVIDER NOTES
EMERGENCY DEPARTMENT HISTORY AND PHYSICAL EXAM    Date: 3/24/2018  Patient Name: Carmen Castro    History of Presenting Illness     Chief Complaint   Patient presents with    Chest Pain         History Provided By: Patient    Chief Complaint: Chest pressure  Duration: 8 Hours  Timing:  Acute and Intermittent  Location: Substernal region   Quality: Pressure  Modifying Factors: ASA PTA  Associated Symptoms: neck pain, back pain, left arm pain, weakness, nausea    Additional History (Context):   4:39 PM  Carmen Castro is a 80 y.o. female with PMHX of CVA, HTN who presents to the emergency department C/O intermittent chest pressure, onset 8 hours ago. Associated sxs include neck pain, back pain, left arm pain, weakness, nausea. Pt reports taking a couple of ASA throughout the day, without any relief. Pt states that the cardiac stress test that she had done in 2017 was her most recent and negative Pt reports that she regularly sees Dr. Lianet Pope as her PCP. Pt takes Aggrenox daily. Pt denies cough, other medical issues, and any other sxs or complaints. PCP: Thaddeus Reveles MD     Current Outpatient Prescriptions   Medication Sig Dispense Refill    Dexlansoprazole (DEXILANT) 60 mg CpDB Take 60 mg by mouth as needed.  naproxen sodium (ALEVE) 220 mg cap Take  by mouth.  valsartan-hydrochlorothiazide (DIOVAN HCT) 160-25 mg per tablet Take 1 Tab by mouth daily.  potassium chloride (KLOR-CON) 20 mEq packet Take 20 mEq by mouth two (2) times a day.  dipyridamole-aspirin (AGGRENOX) 200-25 mg per SR capsule Take 1 Cap by mouth daily.  multivitamin (ONE A DAY) tablet Take 1 Tab by mouth daily.  lidocaine (LIDODERM) 5 % Apply patch to the affected area for 12 hours a day and remove for 12 hours a day. 15 Each 0    acetaminophen (TYLENOL EXTRA STRENGTH) 500 mg tablet Take 2 Tabs by mouth every six (6) hours as needed for Pain.  20 Tab 0    alum-mag hydroxide-simeth (MYLANTA) 200-200-20 mg/5 mL susp Take 30 mL by mouth every four (4) hours as needed. 300 mL 0    calcium carbonate (TUMS) 200 mg calcium (500 mg) Chew Take 1 Tab by mouth as needed. Past History     Past Medical History:  Past Medical History:   Diagnosis Date    Arthritis     osteo    Asthma     inhaler as needed - every 2 to 3 years    Chronic pain     knee and hip    GERD (gastroesophageal reflux disease)     Hypertension     5yrs    Irritable bowel     Other ill-defined conditions(799.89)     h/o migraines    Other ill-defined conditions(799.89)     h/o dizzy    Other ill-defined conditions(799.89)     IBS; hiatal hernia    Stroke ([facility] [address] 9 years ago     stroke - no deficits    Stroke ([facility] [address]     In 1997/98       Past Surgical History:  Past Surgical History:   Procedure Laterality Date    HX CHOLECYSTECTOMY      HX HERNIA REPAIR      umbilical twice    HX INTRAOCULAR LENS PROSTHESIS      HX KNEE ARTHROSCOPY      left    HX OTHER SURGICAL      herniated disc surgery    HX TUBAL LIGATION      LAP UMBILICAL HERNIA REPAIR      LAP,CHOLECYSTECTOMY         Family History:  Family History   Problem Relation Age of Onset    Malignant Hyperthermia Neg Hx     Pseudocholinesterase Deficiency Neg Hx     Post-op Nausea/Vomiting Neg Hx     Delayed Awakening Neg Hx     Emergence Delirium Neg Hx     Post-op Cognitive Dysfunction Neg Hx     Other Neg Hx        Social History:  Social History   Substance Use Topics    Smoking status: Never Smoker    Smokeless tobacco: Never Used    Alcohol use No       Allergies:  No Known Allergies      Review of Systems   Review of Systems   Constitutional: Negative for chills and fever. Respiratory: Positive for chest tightness. Negative for cough and shortness of breath. Cardiovascular: Positive for chest pain (Pressure). Gastrointestinal: Positive for nausea. Negative for abdominal pain and vomiting. Genitourinary: Negative for dysuria and flank pain. Musculoskeletal: Positive for back pain, myalgias (Left arm) and neck pain. Neurological: Negative for dizziness, light-headedness and headaches. All other systems reviewed and are negative. Physical Exam     Vitals:    03/24/18 1627 03/24/18 2051   BP: 113/65 145/63   Pulse: (!) 55 (!) 55   Resp: 15 18   Temp: 97.6 °F (36.4 °C) 97.8 °F (36.6 °C)   SpO2: 99% 99%   Weight: 106.6 kg (235 lb)    Height: 5' 4\" (1.626 m)      Physical Exam   Constitutional: She is oriented to person, place, and time. She appears well-developed and well-nourished. No distress. AA geriatric female in NAD. Alert. Appears comfortable. Answering questions. Following directions. HENT:   Head: Normocephalic and atraumatic. Right Ear: External ear normal.   Left Ear: External ear normal.   Nose: Nose normal.   Mouth/Throat: Uvula is midline, oropharynx is clear and moist and mucous membranes are normal.   Eyes: Conjunctivae are normal. Right eye exhibits no discharge. Left eye exhibits no discharge. Neck: Normal range of motion. Cardiovascular: Normal rate, regular rhythm, normal heart sounds and intact distal pulses. Exam reveals no gallop and no friction rub. No murmur heard. Pulmonary/Chest: Effort normal and breath sounds normal. No accessory muscle usage. No tachypnea. No respiratory distress. She has no decreased breath sounds. She has no wheezes. She has no rhonchi. She has no rales. Abdominal: Soft. Normal appearance. She exhibits no ascites and no mass. There is no tenderness. There is no rigidity, no rebound, no guarding, no CVA tenderness, no tenderness at McBurney's point and negative Lim's sign. Musculoskeletal: Normal range of motion. Neurological: She is alert and oriented to person, place, and time. Skin: Skin is warm and dry. No rash noted. She is not diaphoretic. No erythema. Psychiatric: She has a normal mood and affect.  Judgment normal.   Nursing note and vitals reviewed. Diagnostic Study Results     Labs -     No results found for this or any previous visit (from the past 12 hour(s)). Radiologic Studies -   XR CHEST PORT   Final Result        CT Results  (Last 48 hours)    None        CXR Results  (Last 48 hours)               03/24/18 1711  XR CHEST PORT Final result    Impression:  IMPRESSION:       No acute cardiopulmonary process. Narrative:  EXAM: Portable chest x-ray       INDICATION: Chest pain. COMPARISON: 12/19/2017. TECHNIQUE: Single AP view of the chest was obtained.       _______________       FINDINGS:       The lungs are clear of focal infiltrate. There is no pneumothorax or pleural   effusion. Heart size is within normal limits. There is no significant vascular   congestion. There is no acute osseous abnormality. _______________               7:07 PM  RADIOLOGY FINDINGS  Chest X-ray shows NAP  Pending review by Radiologist  Recorded by Maria Eugenia Castro ED Scribe, as dictated by [email or web address]    Medications given in the ED-  Medications   acetaminophen (OFIRMEV) infusion 1,000 mg (0 mg IntraVENous IV Completed 3/24/18 1930)   famotidine (PF) (PEPCID) injection 20 mg (20 mg IntraVENous Given 3/24/18 1731)   GI COCKTAIL [facility]) (30 mL Oral Given 3/24/18 1859)         Medical Decision Making   I am the first provider for this patient. I reviewed the vital signs, available nursing notes, past medical history, past surgical history, family history and social history. Vital Signs-Reviewed the patient's vital signs. Pulse Oximetry Analysis - 99% on RA     Cardiac Monitor:  Rate: 52 bpm  Rhythm: Sinus bradycardia    EKG interpretation: (Preliminary)  4:25 PM   52 bpm. Sinus bradycardia. No STEMI. EKG read by Christopher Queen MD at 4:25 PM     EKG interpretation: (Secondary)  8:10 PM   52 bpm/ Sinus tachycardia. No RAMESH. Moderate voltage criteria for LVH. No change from previous.   EKG read by [email or web address] at 8:10 PM    Records Reviewed: Nursing Notes and Old Medical Records   Reviewed EMR. Pt had normal pharmaceutical cardiac stress test on 5/5/2015. ECHO cardiogram from same date was unremarkable with an EF of 55-65%. Reviewed [place] results including 2/7/17 pt had negative nuclear med myocardial perfusion test.    Provider Notes (Medical Decision Making): ACS/MI, arrhythmia, pericarditis, myocarditis, GERD, PNA, chest wall pain    Procedures:  Procedures    ED Course:   4:39 PM  Initial assessment performed. The patients presenting problems have been discussed, and they are in agreement with the care plan formulated and outlined with them. I have encouraged them to ask questions as they arise throughout their visit. 6:39 PM  CA is gone. Pt's neck pain is better, but she states that she is still having some CP. She says that in the past, they have given her GI cocktail which has helped this pain. She would like to try one today. Initial EKG and cardiac enzymes are unremarkable, but we will do a 3 hour repeat.    8:01 PM  Reviewed EMR. Pt had normal pharmaceutical stress test on 5/5/2015. ECHO cardiogram from same date was unremarkable with an EF of 55-65%. Reviewed [place] results including on 2/7/17 pt had negative nuclear med myocardial perfusion test.reviewed EMR. Pt had normal pharmaceutical stress test on 5/5/2015. ECHO cardiogram from same date was unremarkable with an EF of 55-65%. Reviewed [place] results including on 2/7/17 pt had negative nuclear med myocardial perfusion test.    8:03 PM  Pt is CP free. 8:41 PM  Pt feels much better. CP resolved. HA resolved. Left arm pain resolved. Doubt cardiac. Low suspicion for PE. Doubt dissection. Pt resting comfortably and anxious for d/c home. Negative stress test in early 2017. Close FU with PCP. Reasons to RTED discussed with pt. All questions answered. Pt feels comfortable going home at this time.  Pt expressed understanding and she agrees with plan.      Diagnosis and Disposition       DISCHARGE NOTE:  8:50 PM  Arik Reina's  results have been reviewed with her. She has been counseled regarding her diagnosis, treatment, and plan. She verbally conveys understanding and agreement of the signs, symptoms, diagnosis, treatment and prognosis and additionally agrees to follow up as discussed. She also agrees with the care-plan and conveys that all of her questions have been answered. I have also provided discharge instructions for her that include: educational information regarding their diagnosis and treatment, and list of reasons why they would want to return to the ED prior to their follow-up appointment, should her condition change. She has been provided with education for proper emergency department utilization. CLINICAL IMPRESSION:    1. Atypical chest pain    2. Acute nonintractable headache, unspecified headache type        PLAN:  1. D/C Home  2. Discharge Medication List as of 3/24/2018  8:47 PM        3. Follow-up Information     Follow up With Details Comments Mirna De Guzman MD   [address] [place] [address]  [phone]      [facility] EMERGENCY DEPT  As needed, If symptoms worsen [address] [id number] [phone]        _______________________________    Attestations: This note is prepared by Rhea Hatfield and Burgess Randhawa, acting as Scribe for Lynn Mary PA-C. Lynn Mary PA-C:  The scribe's documentation has been prepared under my direction and personally reviewed by me in its entirety.   I confirm that the note above accurately reflects all work, treatment, procedures, and medical decision making performed by me.  _______________________________

## 2018-03-24 NOTE — ED NOTES
Assumed care of pt from [facility]. Informed that pt present to ED with CP from home. Repeats to be completed at 2000.

## 2018-03-24 NOTE — ED TRIAGE NOTES
Patient arrived via rescue. Patient presents complaining of intermittent mid-sternal chest pain described as a \"pressure\" onset around 0800 this morning. Patient reports she was laying in bed when the pain onset. Patient states pain radiates into the left side of her neck and down her LUE. Patient also endorses generalized weakness and nausea. Patient received 324mg Aspirin en route. Patient placed on full cardiac monitor upon arrival. Sinus hayley noted. All other vital signs stable. Sepsis Screening completed    (  )Patient meets SIRS criteria. ( x )Patient does not meet SIRS criteria.       SIRS Criteria is achieved when two or more of the following are present   Temperature < 96.8°F (36°C) or > 100.9°F (38.3°C)   Heart Rate > 90 beats per minute   Respiratory Rate > 20 breaths per minute   WBC count > 12,000 or <4,000 or > 10% bands
None

## 2018-03-25 LAB
ATRIAL RATE: 52 BPM
ATRIAL RATE: 52 BPM
CALCULATED P AXIS, ECG09: 52 DEGREES
CALCULATED P AXIS, ECG09: 60 DEGREES
CALCULATED R AXIS, ECG10: -6 DEGREES
CALCULATED T AXIS, ECG11: 21 DEGREES
CALCULATED T AXIS, ECG11: 28 DEGREES
DIAGNOSIS, 93000: NORMAL
DIAGNOSIS, 93000: NORMAL
P-R INTERVAL, ECG05: 166 MS
P-R INTERVAL, ECG05: 176 MS
Q-T INTERVAL, ECG07: 430 MS
Q-T INTERVAL, ECG07: 456 MS
QRS DURATION, ECG06: 94 MS
QRS DURATION, ECG06: 96 MS
QTC CALCULATION (BEZET), ECG08: 399 MS
QTC CALCULATION (BEZET), ECG08: 424 MS
VENTRICULAR RATE, ECG03: 52 BPM
VENTRICULAR RATE, ECG03: 52 BPM

## 2018-03-25 NOTE — ED NOTES
Pt denying CP at the present. Pt saying she was feeling better and ready for d/c home with family. D/C instructions reviewed with pt/understanding acknowledged. Pt being d/c via home via [facility] from the ED with family. Pt has her cane.

## 2018-06-04 ENCOUNTER — HOSPITAL ENCOUNTER (EMERGENCY)
Age: 83
Discharge: HOME | End: 2018-06-04
Attending: INTERNAL MEDICINE
Payer: MEDICARE

## 2018-06-04 ENCOUNTER — APPOINTMENT (OUTPATIENT)
Dept: NUCLEAR MEDICINE | Age: 83
End: 2018-06-04
Attending: INTERNAL MEDICINE
Payer: MEDICARE

## 2018-06-04 ENCOUNTER — APPOINTMENT (OUTPATIENT)
Dept: GENERAL RADIOLOGY | Age: 83
End: 2018-06-04
Attending: INTERNAL MEDICINE
Payer: MEDICARE

## 2018-06-04 VITALS
BODY MASS INDEX: 40.46 KG/M2 | HEART RATE: 84 BPM | WEIGHT: 237 LBS | SYSTOLIC BLOOD PRESSURE: 166 MMHG | HEIGHT: 64 IN | RESPIRATION RATE: 18 BRPM | OXYGEN SATURATION: 100 % | TEMPERATURE: 98 F | DIASTOLIC BLOOD PRESSURE: 46 MMHG

## 2018-06-04 DIAGNOSIS — R06.00 DYSPNEA, UNSPECIFIED TYPE: Primary | ICD-10-CM

## 2018-06-04 DIAGNOSIS — R07.89 ATYPICAL CHEST PAIN: ICD-10-CM

## 2018-06-04 LAB
ALBUMIN SERPL-MCNC: 3.2 G/DL (ref 3.4–5)
ALBUMIN/GLOB SERPL: 0.7 {RATIO} (ref 0.8–1.7)
ALP SERPL-CCNC: 81 U/L (ref 45–117)
ALT SERPL-CCNC: 22 U/L (ref 13–56)
ANION GAP SERPL CALC-SCNC: 11 MMOL/L (ref 3–18)
AST SERPL-CCNC: 20 U/L (ref 15–37)
BASOPHILS # BLD: 0 K/UL (ref 0–0.06)
BASOPHILS NFR BLD: 0 % (ref 0–2)
BILIRUB SERPL-MCNC: 0.5 MG/DL (ref 0.2–1)
BUN SERPL-MCNC: 15 MG/DL (ref 7–18)
BUN/CREAT SERPL: 15 (ref 12–20)
CALCIUM SERPL-MCNC: 9 MG/DL (ref 8.5–10.1)
CHLORIDE SERPL-SCNC: 106 MMOL/L (ref 100–108)
CK MB CFR SERPL CALC: NORMAL % (ref 0–4)
CK MB CFR SERPL CALC: NORMAL % (ref 0–4)
CK MB SERPL-MCNC: <1 NG/ML (ref 5–25)
CK MB SERPL-MCNC: <1 NG/ML (ref 5–25)
CK SERPL-CCNC: 105 U/L (ref 26–192)
CK SERPL-CCNC: 88 U/L (ref 26–192)
CO2 SERPL-SCNC: 25 MMOL/L (ref 21–32)
CREAT SERPL-MCNC: 0.98 MG/DL (ref 0.6–1.3)
D DIMER PPP FEU-MCNC: 0.67 UG/ML(FEU)
DIFFERENTIAL METHOD BLD: ABNORMAL
EOSINOPHIL # BLD: 0.2 K/UL (ref 0–0.4)
EOSINOPHIL NFR BLD: 2 % (ref 0–5)
ERYTHROCYTE [DISTWIDTH] IN BLOOD BY AUTOMATED COUNT: 14.3 % (ref 11.6–14.5)
GLOBULIN SER CALC-MCNC: 4.4 G/DL (ref 2–4)
GLUCOSE SERPL-MCNC: 93 MG/DL (ref 74–99)
HCT VFR BLD AUTO: 36.5 % (ref 35–45)
HGB BLD-MCNC: 11.9 G/DL (ref 12–16)
LYMPHOCYTES # BLD: 2.4 K/UL (ref 0.9–3.6)
LYMPHOCYTES NFR BLD: 22 % (ref 21–52)
MCH RBC QN AUTO: 29.5 PG (ref 24–34)
MCHC RBC AUTO-ENTMCNC: 32.6 G/DL (ref 31–37)
MCV RBC AUTO: 90.6 FL (ref 74–97)
MONOCYTES # BLD: 0.8 K/UL (ref 0.05–1.2)
MONOCYTES NFR BLD: 8 % (ref 3–10)
NEUTS SEG # BLD: 7.7 K/UL (ref 1.8–8)
NEUTS SEG NFR BLD: 68 % (ref 40–73)
PLATELET # BLD AUTO: 322 K/UL (ref 135–420)
PMV BLD AUTO: 9.8 FL (ref 9.2–11.8)
POTASSIUM SERPL-SCNC: 4.2 MMOL/L (ref 3.5–5.5)
PROT SERPL-MCNC: 7.6 G/DL (ref 6.4–8.2)
RBC # BLD AUTO: 4.03 M/UL (ref 4.2–5.3)
SODIUM SERPL-SCNC: 142 MMOL/L (ref 136–145)
TROPONIN I SERPL-MCNC: 0.03 NG/ML (ref 0–0.06)
TROPONIN I SERPL-MCNC: 0.03 NG/ML (ref 0–0.06)
WBC # BLD AUTO: 11.2 K/UL (ref 4.6–13.2)

## 2018-06-04 PROCEDURE — 85379 FIBRIN DEGRADATION QUANT: CPT | Performed by: INTERNAL MEDICINE

## 2018-06-04 PROCEDURE — 74011250637 HC RX REV CODE- 250/637: Performed by: INTERNAL MEDICINE

## 2018-06-04 PROCEDURE — 80053 COMPREHEN METABOLIC PANEL: CPT | Performed by: INTERNAL MEDICINE

## 2018-06-04 PROCEDURE — 85025 COMPLETE CBC W/AUTO DIFF WBC: CPT | Performed by: INTERNAL MEDICINE

## 2018-06-04 PROCEDURE — 82550 ASSAY OF CK (CPK): CPT | Performed by: INTERNAL MEDICINE

## 2018-06-04 PROCEDURE — 93005 ELECTROCARDIOGRAM TRACING: CPT

## 2018-06-04 PROCEDURE — 71045 X-RAY EXAM CHEST 1 VIEW: CPT

## 2018-06-04 PROCEDURE — 99285 EMERGENCY DEPT VISIT HI MDM: CPT

## 2018-06-04 PROCEDURE — A9567 TECHNETIUM TC-99M AEROSOL: HCPCS

## 2018-06-04 RX ORDER — ACETAMINOPHEN 500 MG
1000 TABLET ORAL
Status: COMPLETED | OUTPATIENT
Start: 2018-06-04 | End: 2018-06-04

## 2018-06-04 RX ORDER — FAMOTIDINE 20 MG/1
20 TABLET, FILM COATED ORAL 2 TIMES DAILY
Qty: 20 TAB | Refills: 0 | Status: SHIPPED | OUTPATIENT
Start: 2018-06-04 | End: 2018-06-14

## 2018-06-04 RX ADMIN — ACETAMINOPHEN 1000 MG: 500 TABLET, FILM COATED ORAL at 19:34

## 2018-06-04 RX ADMIN — Medication 30 ML: at 19:35

## 2018-06-04 NOTE — ED PROVIDER NOTES
EMERGENCY DEPARTMENT HISTORY AND PHYSICAL EXAM    Date: 6/4/2018  Patient Name: Gonsalo Palmer    History of Presenting Illness     Chief Complaint   Patient presents with    Chest Pain         History Provided By: Patient     Chief Complaint: chest pain  Duration: 1 Days  Timing:  Acute  Severity: 5 out of 10  Modifying Factors: regular Aspirin  Associated Symptoms: left arm pain, headache, SOB, cough productive grey phlegm, nasal congestion, hot flashes, diarrhea, and dizziness. Additional History (Context):   3:28 PM  Gonsalo Palmer is a 80 y.o. female with PMHx of stroke, HTN who presents to the emergency department C/O 5/10 chest pain radiating to left arm onset 1 day ago. Pt reports the chest pain woke her out of her sleep and she felt like she was being \"smothered\". Associated symptoms include headache, SOB, cough productive grey phlegm, nasal congestion, hot flashes, diarrhea x4, and dizziness. Took 2 regular Aspirin ~10 hours ago. Previous episode of similar symptoms and negative stress test 1 year ago. Denies tobacco or EtOH usage. Pt denies sore throat, nausea, vomiting, leg swelling, syncope, and any other Sx or complaints. PCP: Lexy Wheeler,     Current Outpatient Prescriptions   Medication Sig Dispense Refill    famotidine (PEPCID) 20 mg tablet Take 1 Tab by mouth two (2) times a day for 10 days. 20 Tab 0    Dexlansoprazole (DEXILANT) 60 mg CpDB Take 60 mg by mouth as needed.  valsartan-hydrochlorothiazide (DIOVAN HCT) 160-25 mg per tablet Take 1 Tab by mouth two (2) times a day.  potassium chloride (KLOR-CON) 20 mEq packet Take 20 mEq by mouth two (2) times a day.  dipyridamole-aspirin (AGGRENOX) 200-25 mg per SR capsule Take 1 Cap by mouth daily.  calcium carbonate (TUMS) 200 mg calcium (500 mg) Chew Take 1 Tab by mouth as needed.  multivitamin (ONE A DAY) tablet Take 1 Tab by mouth daily.         acetaminophen (TYLENOL EXTRA STRENGTH) 500 mg tablet Take 2 Tabs by mouth every six (6) hours as needed for Pain. 20 Tab 0       Past History     Past Medical History:  Past Medical History:   Diagnosis Date    Arthritis     osteo    Asthma     inhaler as needed - every 2 to 3 years    Chronic pain     knee and hip    GERD (gastroesophageal reflux disease)     Hypertension     5yrs    Irritable bowel     Other ill-defined conditions(799.89)     h/o migraines    Other ill-defined conditions(799.89)     h/o dizzy    Other ill-defined conditions(799.89)     IBS; hiatal hernia    Stroke ([facility] [address] 9 years ago     stroke - no deficits    Stroke ([facility] [address]     In 1997/98       Past Surgical History:  Past Surgical History:   Procedure Laterality Date    HX CHOLECYSTECTOMY      HX HERNIA REPAIR      umbilical twice    HX INTRAOCULAR LENS PROSTHESIS      HX KNEE ARTHROSCOPY      left    HX OTHER SURGICAL      herniated disc surgery    HX TUBAL LIGATION      LAP UMBILICAL HERNIA REPAIR      LAP,CHOLECYSTECTOMY         Family History:  Family History   Problem Relation Age of Onset    Malignant Hyperthermia Neg Hx     Pseudocholinesterase Deficiency Neg Hx     Post-op Nausea/Vomiting Neg Hx     Delayed Awakening Neg Hx     Emergence Delirium Neg Hx     Post-op Cognitive Dysfunction Neg Hx     Other Neg Hx        Social History:  Social History   Substance Use Topics    Smoking status: Never Smoker    Smokeless tobacco: Never Used    Alcohol use No       Allergies:  No Known Allergies      Review of Systems   Review of Systems   HENT: Positive for congestion. Negative for sore throat. Respiratory: Positive for cough and shortness of breath. Cardiovascular: Positive for chest pain. Negative for leg swelling. Gastrointestinal: Negative for nausea and vomiting. Musculoskeletal: Positive for myalgias (left arm pain). Neurological: Positive for headaches. Negative for syncope. All other systems reviewed and are negative.        Physical Exam Vitals:    06/04/18 1526 06/04/18 1530 06/04/18 1700 06/04/18 1800   BP: 132/61 126/68 107/71 122/88   Pulse: (!) 56 (!) 55 (!) 57 64   Resp: 20 25 15 17   Temp: 98 °F (36.7 °C)      SpO2: 99% 98% 100% 97%   Weight: 107.5 kg (237 lb)      Height: 5' 4\" (1.626 m)        Physical Exam   Constitutional: She is oriented to person, place, and time. She appears distressed (mild). Morbidly obese    HENT:   Head: Normocephalic and atraumatic. Right Ear: External ear normal.   Left Ear: External ear normal.   Nose: Nose normal.   Mouth/Throat: Oropharynx is clear and moist.   Partial edentulous. No swelling, redness, or exudate    Eyes: Conjunctivae and EOM are normal. Pupils are equal, round, and reactive to light. Right eye exhibits no discharge. Left eye exhibits no discharge. No scleral icterus. Neck: Normal range of motion. Neck supple. No JVD present. No tracheal deviation present. Cardiovascular: Regular rhythm, normal heart sounds and intact distal pulses. Bradycardia present. Pulses:       Posterior tibial pulses are 2+ on the right side, and 2+ on the left side. Pulmonary/Chest: Effort normal and breath sounds normal.   Chest non tender to palpation   Abdominal: Soft. Bowel sounds are normal. She exhibits no distension. There is no tenderness. Obese. No HSM   Musculoskeletal: Normal range of motion. She exhibits no edema. Neurological: She is alert and oriented to person, place, and time. She has normal reflexes. No cranial nerve deficit. She exhibits normal muscle tone. Coordination normal.   No focal motor weakness. Skin: Skin is warm and dry. No rash noted. Psychiatric: She has a normal mood and affect. Her behavior is normal.   Nursing note and vitals reviewed.        Diagnostic Study Results     Labs -     Recent Results (from the past 12 hour(s))   EKG, 12 LEAD, INITIAL    Collection Time: 06/04/18  3:21 PM   Result Value Ref Range    Ventricular Rate 52 BPM    Atrial Rate 52 BPM P-R Interval 168 ms    QRS Duration 98 ms    Q-T Interval 440 ms    QTC Calculation (Bezet) 409 ms    Calculated P Axis 56 degrees    Calculated R Axis -14 degrees    Calculated T Axis 14 degrees    Diagnosis       Sinus bradycardia  Moderate voltage criteria for LVH, may be normal variant  Borderline ECG  When compared with ECG of 24-MAR-2018 20:05,  No significant change was found     CBC WITH AUTOMATED DIFF    Collection Time: 06/04/18  5:19 PM   Result Value Ref Range    WBC 11.2 4.6 - 13.2 K/uL    RBC 4.03 (L) 4.20 - 5.30 M/uL    HGB 11.9 (L) 12.0 - 16.0 g/dL    HCT 36.5 35.0 - 45.0 %    MCV 90.6 74.0 - 97.0 FL    MCH 29.5 24.0 - 34.0 PG    MCHC 32.6 31.0 - 37.0 g/dL    RDW 14.3 11.6 - 14.5 %    PLATELET 422 771 - 545 K/uL    MPV 9.8 9.2 - 11.8 FL    NEUTROPHILS 68 40 - 73 %    LYMPHOCYTES 22 21 - 52 %    MONOCYTES 8 3 - 10 %    EOSINOPHILS 2 0 - 5 %    BASOPHILS 0 0 - 2 %    ABS. NEUTROPHILS 7.7 1.8 - 8.0 K/UL    ABS. LYMPHOCYTES 2.4 0.9 - 3.6 K/UL    ABS. MONOCYTES 0.8 0.05 - 1.2 K/UL    ABS. EOSINOPHILS 0.2 0.0 - 0.4 K/UL    ABS.  BASOPHILS 0.0 0.0 - 0.06 K/UL    DF AUTOMATED     CARDIAC PANEL,(CK, CKMB & TROPONIN)    Collection Time: 06/04/18  5:19 PM   Result Value Ref Range    CK 88 26 - 192 U/L    CK - MB <1.0 <3.6 ng/ml    CK-MB Index  0.0 - 4.0 %     CALCULATION NOT PERFORMED WHEN RESULT IS BELOW LINEAR LIMIT    Troponin-I, Qt. 0.03 0.00 - 6.58 NG/ML   METABOLIC PANEL, COMPREHENSIVE    Collection Time: 06/04/18  5:19 PM   Result Value Ref Range    Sodium 142 136 - 145 mmol/L    Potassium 4.2 3.5 - 5.5 mmol/L    Chloride 106 100 - 108 mmol/L    CO2 25 21 - 32 mmol/L    Anion gap 11 3.0 - 18 mmol/L    Glucose 93 74 - 99 mg/dL    BUN 15 7.0 - 18 MG/DL    Creatinine 0.98 0.6 - 1.3 MG/DL    BUN/Creatinine ratio 15 12 - 20      GFR est AA >60 >60 ml/min/1.73m2    GFR est non-AA 54 (L) >60 ml/min/1.73m2    Calcium 9.0 8.5 - 10.1 MG/DL    Bilirubin, total 0.5 0.2 - 1.0 MG/DL    ALT (SGPT) 22 13 - 56 U/L    AST (SGOT) 20 15 - 37 U/L    Alk. phosphatase 81 45 - 117 U/L    Protein, total 7.6 6.4 - 8.2 g/dL    Albumin 3.2 (L) 3.4 - 5.0 g/dL    Globulin 4.4 (H) 2.0 - 4.0 g/dL    A-G Ratio 0.7 (L) 0.8 - 1.7     D DIMER    Collection Time: 06/04/18  5:19 PM   Result Value Ref Range    D DIMER 0.67 (H) <0.46 ug/ml(FEU)   CARDIAC PANEL,(CK, CKMB & TROPONIN)    Collection Time: 06/04/18  8:30 PM   Result Value Ref Range     26 - 192 U/L    CK - MB <1.0 <3.6 ng/ml    CK-MB Index  0.0 - 4.0 %     CALCULATION NOT PERFORMED WHEN RESULT IS BELOW LINEAR LIMIT    Troponin-I, Qt. 0.03 0.00 - 0.06 NG/ML       Radiologic Studies -   XR CHEST PORT   Final Result   Impression:  Mildly underexpanded lungs without superimposed acute radiographic abnormality. As read by the radiologist.     CT Results  (Last 48 hours)    None        CXR Results  (Last 48 hours)               06/04/18 1528  XR CHEST PORT Final result    Impression:  Impression:   Mildly underexpanded lungs without superimposed acute radiographic abnormality. Narrative:  Chest, single view       Indication: Left-sided chest pain radiating into the left upper extremity       Comparison: Several prior chest radiographs, most recently March 24, 2018       Findings:  Portable upright AP view of the chest was obtained. The lungs are   mildly underexpanded but clear. No focal pneumonic consolidation, pneumothorax,   or pleural effusion. The cardiac size and mediastinal contours are stable. No   acute osseous abnormality is present.                V/q scan low prob for pe    Medications given in the ED-  Medications   GI COCKTAIL [facility] CMPD) (30 mL Oral Given 6/1935)   acetaminophen (TYLENOL) tablet 1,000 mg (1,000 mg Oral Given 6/4/18 1934)   technetium pentetate (DTPA) aerosol 33 millicurie (33 millicuries Inhalation Given 6/4/18 2000)   technetium albumin aggregated (MAA) solution 6 millicurie (6 millicuries IntraVENous Given 6/4/18 2000)         Medical Decision Making I am the first provider for this patient. I reviewed the vital signs, available nursing notes, past medical history, past surgical history, family history and social history. Vital Signs-Reviewed the patient's vital signs. Pulse Oximetry Analysis - 99% on RA     Cardiac Monitor:  Rate: 52 bpm  Rhythm: Sinus bradycardia    EKG interpretation: (Preliminary)  Rate: 52 bpm. Rhythm: Sinus bradycardia. No STEMI. No significant change from 3/24/18   EKG read by Zohra Mueller MD at 3:26 PM    Records Reviewed: Nursing Notes and Old Medical Records    Provider Notes (Medical Decision Making):   Ddx: ACS, CHF, MI, PE, pneumonia, pneumothorax, esophageal spasms, GERD, anemia, uremia. Rule out: other cardiovascular, pulmonary, or GI pathology. Procedures:  Procedures    ED Course:   3:28 PM Initial assessment performed. The patients presenting problems have been discussed, and they are in agreement with the care plan formulated and outlined with them. I have encouraged them to ask questions as they arise throughout their visit. 9:48 PM Pt updated on results. She has no sxs, vss. She feels better, thankful for the care, and ready for discharge. Diagnosis and Disposition       DISCHARGE NOTE:  9:46 PM  Finesse Reina's  results have been reviewed with her. She has been counseled regarding her diagnosis, treatment, and plan. She verbally conveys understanding and agreement of the signs, symptoms, diagnosis, treatment and prognosis and additionally agrees to follow up as discussed. She also agrees with the care-plan and conveys that all of her questions have been answered. I have also provided discharge instructions for her that include: educational information regarding their diagnosis and treatment, and list of reasons why they would want to return to the ED prior to their follow-up appointment, should her condition change.  She has been provided with education for proper emergency department utilization. CLINICAL IMPRESSION:    1. Dyspnea, unspecified type    2. Atypical chest pain        PLAN:  1. D/C Home  2. Current Discharge Medication List      START taking these medications    Details   famotidine (PEPCID) 20 mg tablet Take 1 Tab by mouth two (2) times a day for 10 days. Qty: 20 Tab, Refills: 0           3. Follow-up Information     Follow up With Details Comments [id number] Aditya Calzada DO Schedule an appointment as soon as possible for a visit For primary care follow up [address]      Melissa Myers MD Schedule an appointment as soon as possible for a visit For follow up with cardiology [address] [place] [address] EMERGENCY DEPT Go to As needed, as symptoms worsen [address] [place] [address]  [phone]        _______________________________    Attestations: This note is prepared by Mia Rivas, acting as Scribe for Serge Mora MD.    Serge Mora MD:  The scribe's documentation has been prepared under my direction and personally reviewed by me in its entirety.   I confirm that the note above accurately reflects all work, treatment, procedures, and medical decision making performed by me.  _______________________________

## 2018-06-04 NOTE — ED TRIAGE NOTES
Triage: pt complains of intermittent left chest pain with radiation to left arm. Symptoms began last night. Sepsis Screening completed    (  )Patient meets SIRS criteria. ( x )Patient does not meet SIRS criteria.       SIRS Criteria is achieved when two or more of the following are present   Temperature < 96.8°F (36°C) or > 100.9°F (38.3°C)   Heart Rate > 90 beats per minute   Respiratory Rate > 20 breaths per minute   WBC count > 12,000 or <4,000 or > 10% bands

## 2018-06-05 NOTE — DISCHARGE INSTRUCTIONS
Chest Pain: Care Instructions  Your Care Instructions    There are many things that can cause chest pain. Some are not serious and will get better on their own in a few days. But some kinds of chest pain need more testing and treatment. Your doctor may have recommended a follow-up visit in the next 8 to 12 hours. If you are not getting better, you may need more tests or treatment. Even though your doctor has released you, you still need to watch for any problems. The doctor carefully checked you, but sometimes problems can develop later. If you have new symptoms or if your symptoms do not get better, get medical care right away. If you have worse or different chest pain or pressure that lasts more than 5 minutes or you passed out (lost consciousness), call 911 or seek other emergency help right away. A medical visit is only one step in your treatment. Even if you feel better, you still need to do what your doctor recommends, such as going to all suggested follow-up appointments and taking medicines exactly as directed. This will help you recover and help prevent future problems. How can you care for yourself at home? · Rest until you feel better. · Take your medicine exactly as prescribed. Call your doctor if you think you are having a problem with your medicine. · Do not drive after taking a prescription pain medicine. When should you call for help? Call 911 if:  ? · You passed out (lost consciousness). ? · You have severe difficulty breathing. ? · You have symptoms of a heart attack. These may include:  ¨ Chest pain or pressure, or a strange feeling in your chest.  ¨ Sweating. ¨ Shortness of breath. ¨ Nausea or vomiting. ¨ Pain, pressure, or a strange feeling in your back, neck, jaw, or upper belly or in one or both shoulders or arms. ¨ Lightheadedness or sudden weakness. ¨ A fast or irregular heartbeat.   After you call 911, the  may tell you to chew 1 adult-strength or 2 to 4 low-dose aspirin. Wait for an ambulance. Do not try to drive yourself. ?Call your doctor today if:  ? · You have any trouble breathing. ? · Your chest pain gets worse. ? · You are dizzy or lightheaded, or you feel like you may faint. ? · You are not getting better as expected. ? · You are having new or different chest pain. Where can you learn more? Go to http://shell-david.info/. Enter A120 in the search box to learn more about \"Chest Pain: Care Instructions. \"  Current as of: March 20, 2017  Content Version: 11.4  © 3683-4679 [employer]. Care instructions adapted under license by [employer] (which disclaims liability or warranty for this information). If you have questions about a medical condition or this instruction, always ask your healthcare professional. [place] [address] any warranty or liability for your use of this information. Shortness of Breath: Care Instructions  Your Care Instructions  Shortness of breath has many causes. Sometimes conditions such as anxiety can lead to shortness of breath. Some people get mild shortness of breath when they exercise. Trouble breathing also can be a symptom of a serious problem, such as asthma, lung disease, emphysema, heart problems, and pneumonia. If your shortness of breath continues, you may need tests and treatment. Watch for any changes in your breathing and other symptoms. Follow-up care is a key part of your treatment and safety. Be sure to make and go to all appointments, and call your doctor if you are having problems. It's also a good idea to know your test results and keep a list of the medicines you take. How can you care for yourself at home? · Do not smoke or allow others to smoke around you. If you need help quitting, talk to your doctor about stop-smoking programs and medicines. These can increase your chances of quitting for good.   · Get plenty of rest and sleep.  · Take your medicines exactly as prescribed. Call your doctor if you think you are having a problem with your medicine. · Find healthy ways to deal with stress. ¨ Exercise daily. ¨ Get plenty of sleep. ¨ Eat regularly and well. When should you call for help? Call 911 anytime you think you may need emergency care. For example, call if:  ? · You have severe shortness of breath. ? · You have symptoms of a heart attack. These may include:  ¨ Chest pain or pressure, or a strange feeling in the chest.  ¨ Sweating. ¨ Shortness of breath. ¨ Nausea or vomiting. ¨ Pain, pressure, or a strange feeling in the back, neck, jaw, or upper belly or in one or both shoulders or arms. ¨ Lightheadedness or sudden weakness. ¨ A fast or irregular heartbeat. After you call 911, the  may tell you to chew 1 adult-strength or 2 to 4 low-dose aspirin. Wait for an ambulance. Do not try to drive yourself. ?Call your doctor now or seek immediate medical care if:  ? · Your shortness of breath gets worse or you start to wheeze. Wheezing is a high-pitched sound when you breathe. ? · You wake up at night out of breath or have to prop your head up on several pillows to breathe. ? · You are short of breath after only light activity or while at rest.   ? Watch closely for changes in your health, and be sure to contact your doctor if:  ? · You do not get better over the next 1 to 2 days. Where can you learn more? Go to http://shell-david.info/. Enter S780 in the search box to learn more about \"Shortness of Breath: Care Instructions. \"  Current as of: May 12, 2017  Content Version: 11.4  © 2179-4703 [employer]. Care instructions adapted under license by [employer] (which disclaims liability or warranty for this information).  If you have questions about a medical condition or this instruction, always ask your healthcare professional. Manolo Diaz any warranty or liability for your use of this information.

## 2018-06-05 NOTE — ED NOTES
Pt stable. No signs of acute distress. Pt states she is feeling better. VS stable. Pt being discharged home. I have reviewed discharge instructions with the patient. The patient verbalized understanding. Patient armband removed and shredded No further questions/concerns. Pt brought to waiting room via wheelchair.

## 2018-06-05 NOTE — ED NOTES
Pt received from previous nurse. Pt alert and oriented x4. No signs of acute distress. Pt with chest discomfort with shortness of breath. Pt breathing with ease on room air. Repeat cardiacs sent and PIV placed to right AC. Pt left for NM. Will monitor when patient returns.

## 2018-06-09 LAB
ATRIAL RATE: 52 BPM
CALCULATED P AXIS, ECG09: 56 DEGREES
CALCULATED R AXIS, ECG10: -14 DEGREES
CALCULATED T AXIS, ECG11: 14 DEGREES
DIAGNOSIS, 93000: NORMAL
P-R INTERVAL, ECG05: 168 MS
Q-T INTERVAL, ECG07: 440 MS
QRS DURATION, ECG06: 98 MS
QTC CALCULATION (BEZET), ECG08: 409 MS
VENTRICULAR RATE, ECG03: 52 BPM

## 2018-12-10 ENCOUNTER — HOSPITAL ENCOUNTER (OUTPATIENT)
Dept: PREADMISSION TESTING | Age: 83
Discharge: HOME | End: 2018-12-10
Payer: MEDICARE

## 2018-12-10 ENCOUNTER — HOSPITAL ENCOUNTER (OUTPATIENT)
Dept: GENERAL RADIOLOGY | Age: 83
Discharge: HOME | End: 2018-12-10
Payer: MEDICARE

## 2018-12-10 DIAGNOSIS — Z01.818 PREOP EXAMINATION: ICD-10-CM

## 2018-12-10 LAB — XX-LABCORP SPECIMEN COL,LCBCF: NORMAL

## 2018-12-10 PROCEDURE — 99001 SPECIMEN HANDLING PT-LAB: CPT

## 2018-12-10 PROCEDURE — 71045 X-RAY EXAM CHEST 1 VIEW: CPT

## 2018-12-10 PROCEDURE — 93005 ELECTROCARDIOGRAM TRACING: CPT

## 2018-12-13 LAB
ATRIAL RATE: 62 BPM
CALCULATED P AXIS, ECG09: 61 DEGREES
CALCULATED T AXIS, ECG11: 33 DEGREES
DIAGNOSIS, 93000: NORMAL
P-R INTERVAL, ECG05: 180 MS
Q-T INTERVAL, ECG07: 428 MS
QRS DURATION, ECG06: 92 MS
QTC CALCULATION (BEZET), ECG08: 434 MS
VENTRICULAR RATE, ECG03: 62 BPM

## 2018-12-26 ENCOUNTER — HOSPITAL ENCOUNTER (OUTPATIENT)
Age: 83
Setting detail: OUTPATIENT SURGERY
Discharge: HOME | End: 2018-12-26
Attending: INTERNAL MEDICINE | Admitting: INTERNAL MEDICINE
Payer: MEDICARE

## 2018-12-26 VITALS
BODY MASS INDEX: 38.5 KG/M2 | TEMPERATURE: 97.5 F | RESPIRATION RATE: 23 BRPM | OXYGEN SATURATION: 100 % | HEART RATE: 52 BPM | DIASTOLIC BLOOD PRESSURE: 62 MMHG | SYSTOLIC BLOOD PRESSURE: 155 MMHG | HEIGHT: 64 IN | WEIGHT: 225.5 LBS

## 2018-12-26 DIAGNOSIS — R93.1 ABNORMAL ECHOCARDIOGRAM: ICD-10-CM

## 2018-12-26 LAB
ALBUMIN SERPL-MCNC: 3.7 G/DL (ref 3.4–5)
ALBUMIN/GLOB SERPL: 1 {RATIO} (ref 0.8–1.7)
ALP SERPL-CCNC: 103 U/L (ref 45–117)
ALT SERPL-CCNC: 17 U/L (ref 13–56)
ANION GAP SERPL CALC-SCNC: 9 MMOL/L (ref 3–18)
AST SERPL-CCNC: 15 U/L (ref 15–37)
BILIRUB SERPL-MCNC: 0.3 MG/DL (ref 0.2–1)
BUN SERPL-MCNC: 22 MG/DL (ref 7–18)
BUN/CREAT SERPL: 18
CALCIUM SERPL-MCNC: 9.3 MG/DL (ref 8.5–10.1)
CHLORIDE SERPL-SCNC: 108 MMOL/L (ref 100–108)
CO2 SERPL-SCNC: 24 MMOL/L (ref 21–32)
CREAT SERPL-MCNC: 1.2 MG/DL (ref 0.6–1.3)
ERYTHROCYTE [DISTWIDTH] IN BLOOD BY AUTOMATED COUNT: 14.1 % (ref 11.6–14.5)
GLOBULIN SER CALC-MCNC: 3.7 G/DL (ref 2–4)
GLUCOSE SERPL-MCNC: 99 MG/DL (ref 74–99)
HCT VFR BLD AUTO: 35.8 % (ref 35–45)
HGB BLD-MCNC: 11.7 G/DL (ref 12–16)
INR PPP: 0.9 (ref 0.8–1.2)
MCH RBC QN AUTO: 29.8 PG (ref 24–34)
MCHC RBC AUTO-ENTMCNC: 32.7 G/DL (ref 31–37)
MCV RBC AUTO: 91.1 FL (ref 74–97)
PLATELET # BLD AUTO: 322 K/UL (ref 135–420)
PMV BLD AUTO: 9.5 FL (ref 9.2–11.8)
POTASSIUM SERPL-SCNC: 4.1 MMOL/L (ref 3.5–5.5)
PROT SERPL-MCNC: 7.4 G/DL (ref 6.4–8.2)
PROTHROMBIN TIME: 11.8 SEC (ref 11.5–15.2)
RBC # BLD AUTO: 3.93 M/UL (ref 4.2–5.3)
SODIUM SERPL-SCNC: 141 MMOL/L (ref 136–145)
WBC # BLD AUTO: 8.8 K/UL (ref 4.6–13.2)

## 2018-12-26 PROCEDURE — 77030004522 HC CATH ANGI DX EXPO BSC -A: Performed by: INTERNAL MEDICINE

## 2018-12-26 PROCEDURE — C1769 GUIDE WIRE: HCPCS | Performed by: INTERNAL MEDICINE

## 2018-12-26 PROCEDURE — C1894 INTRO/SHEATH, NON-LASER: HCPCS | Performed by: INTERNAL MEDICINE

## 2018-12-26 PROCEDURE — 74011000250 HC RX REV CODE- 250: Performed by: INTERNAL MEDICINE

## 2018-12-26 PROCEDURE — 36415 COLL VENOUS BLD VENIPUNCTURE: CPT

## 2018-12-26 PROCEDURE — 74011250636 HC RX REV CODE- 250/636: Performed by: INTERNAL MEDICINE

## 2018-12-26 PROCEDURE — 99153 MOD SED SAME PHYS/QHP EA: CPT | Performed by: INTERNAL MEDICINE

## 2018-12-26 PROCEDURE — 74011636320 HC RX REV CODE- 636/320: Performed by: INTERNAL MEDICINE

## 2018-12-26 PROCEDURE — 77030008543 HC TBNG MON PRSS MRTM -A: Performed by: INTERNAL MEDICINE

## 2018-12-26 PROCEDURE — 93458 L HRT ARTERY/VENTRICLE ANGIO: CPT | Performed by: INTERNAL MEDICINE

## 2018-12-26 PROCEDURE — 77030013797 HC KT TRNSDUC PRSSR EDWD -A: Performed by: INTERNAL MEDICINE

## 2018-12-26 PROCEDURE — 99152 MOD SED SAME PHYS/QHP 5/>YRS: CPT | Performed by: INTERNAL MEDICINE

## 2018-12-26 PROCEDURE — 85027 COMPLETE CBC AUTOMATED: CPT

## 2018-12-26 PROCEDURE — 85610 PROTHROMBIN TIME: CPT

## 2018-12-26 PROCEDURE — 77030029997 HC DEV COM RDL R BND TELE -B: Performed by: INTERNAL MEDICINE

## 2018-12-26 PROCEDURE — 80053 COMPREHEN METABOLIC PANEL: CPT

## 2018-12-26 PROCEDURE — 74011250636 HC RX REV CODE- 250/636

## 2018-12-26 RX ORDER — LIDOCAINE HYDROCHLORIDE 10 MG/ML
INJECTION INFILTRATION; PERINEURAL AS NEEDED
Status: DISCONTINUED | OUTPATIENT
Start: 2018-12-26 | End: 2018-12-26 | Stop reason: HOSPADM

## 2018-12-26 RX ORDER — RANOLAZINE 500 MG/1
500 TABLET, EXTENDED RELEASE ORAL 2 TIMES DAILY
COMMUNITY
End: 2018-12-26

## 2018-12-26 RX ORDER — SODIUM CHLORIDE 0.9 % (FLUSH) 0.9 %
5-10 SYRINGE (ML) INJECTION AS NEEDED
Status: DISCONTINUED | OUTPATIENT
Start: 2018-12-26 | End: 2018-12-26 | Stop reason: HOSPADM

## 2018-12-26 RX ORDER — HEPARIN SODIUM 1000 [USP'U]/ML
INJECTION, SOLUTION INTRAVENOUS; SUBCUTANEOUS AS NEEDED
Status: DISCONTINUED | OUTPATIENT
Start: 2018-12-26 | End: 2018-12-26 | Stop reason: HOSPADM

## 2018-12-26 RX ORDER — SIMVASTATIN 5 MG/1
5 TABLET, FILM COATED ORAL
COMMUNITY
End: 2020-05-01

## 2018-12-26 RX ORDER — MECLIZINE HCL 12.5 MG 12.5 MG/1
12.5 TABLET ORAL
COMMUNITY

## 2018-12-26 RX ORDER — MIDAZOLAM HYDROCHLORIDE 1 MG/ML
INJECTION, SOLUTION INTRAMUSCULAR; INTRAVENOUS AS NEEDED
Status: DISCONTINUED | OUTPATIENT
Start: 2018-12-26 | End: 2018-12-26 | Stop reason: HOSPADM

## 2018-12-26 RX ORDER — PROMETHAZINE HYDROCHLORIDE 25 MG/1
25 TABLET ORAL
COMMUNITY
End: 2020-05-01

## 2018-12-26 RX ORDER — METOPROLOL TARTRATE 25 MG/1
25 TABLET, FILM COATED ORAL 2 TIMES DAILY
COMMUNITY
End: 2018-12-26

## 2018-12-26 RX ORDER — SODIUM CHLORIDE 9 MG/ML
75 INJECTION, SOLUTION INTRAVENOUS CONTINUOUS
Status: DISCONTINUED | OUTPATIENT
Start: 2018-12-26 | End: 2018-12-26 | Stop reason: HOSPADM

## 2018-12-26 RX ORDER — FENTANYL CITRATE 50 UG/ML
INJECTION, SOLUTION INTRAMUSCULAR; INTRAVENOUS AS NEEDED
Status: DISCONTINUED | OUTPATIENT
Start: 2018-12-26 | End: 2018-12-26 | Stop reason: HOSPADM

## 2018-12-26 RX ORDER — VERAPAMIL HYDROCHLORIDE 2.5 MG/ML
INJECTION, SOLUTION INTRAVENOUS AS NEEDED
Status: DISCONTINUED | OUTPATIENT
Start: 2018-12-26 | End: 2018-12-26 | Stop reason: HOSPADM

## 2018-12-26 RX ORDER — HEPARIN SODIUM 200 [USP'U]/100ML
INJECTION, SOLUTION INTRAVENOUS
Status: COMPLETED | OUTPATIENT
Start: 2018-12-26 | End: 2018-12-26

## 2018-12-26 RX ORDER — SODIUM CHLORIDE 0.9 % (FLUSH) 0.9 %
5-10 SYRINGE (ML) INJECTION EVERY 8 HOURS
Status: DISCONTINUED | OUTPATIENT
Start: 2018-12-26 | End: 2018-12-26 | Stop reason: HOSPADM

## 2018-12-26 RX ORDER — COLCHICINE 0.6 MG/1
0.6 TABLET ORAL AS NEEDED
COMMUNITY
End: 2021-05-26

## 2018-12-26 RX ADMIN — SODIUM CHLORIDE 75 ML/HR: 900 INJECTION, SOLUTION INTRAVENOUS at 09:39

## 2018-12-26 NOTE — Clinical Note
TRANSFER - OUT REPORT:  
 
Verbal report given to: Chantal Garcia RN. Report consisted of patient's Situation, Background, Assessment and  
Recommendations(SBAR). Opportunity for questions and clarification was provided. Patient transported with a Cardiac Cath Tech / Patient Care Tech. Patient transported to: Care Unit.

## 2018-12-26 NOTE — DISCHARGE SUMMARY
Discharge Summary    Patient: Azar Marcial MRN: [id number]  CSN: [id number]    YOB: 1935  Age: 80 y.o. Sex: female    DOA: 12/26/2018 LOS:  LOS: 0 days   Discharge Date:      Primary Care Provider: David Pitts DO    Admission Diagnoses: Abnormal echocardiogram [R93.1], Angina class II    Discharge Diagnoses:  Chest pain   Problem List as of 12/26/2018 Date Reviewed: 4/29/2015          Codes Class Noted - Resolved    MACIAS (dyspnea on exertion) ICD-10-CM: R06.09  ICD-9-CM: 786.09  4/29/2015 - Present        Palpitation ICD-10-CM: R00.2  ICD-9-CM: 785.1  4/29/2015 - Present        Laryngitis ICD-10-CM: J04.0  ICD-9-CM: 464.00  2/24/2015 - Present        Acute upper respiratory infection ICD-10-CM: J06.9  ICD-9-CM: 465.9  2/24/2015 - Present        Morbid obesity ([address] ICD-10-CM: E66.01  ICD-9-CM: 278.01  6/10/2013 - Present        Chest pain ICD-10-CM: R07.9  ICD-9-CM: 786.50  6/9/2013 - Present        Cerebrovascular disease ICD-10-CM: I67.9  ICD-9-CM: 437.9  6/9/2013 - Present    Overview Signed 6/9/2013  5:19 PM by Robb Daily MD     Stroke in 2000  Minimal left sided weakness as result             HTN (hypertension) ICD-10-CM: I10  ICD-9-CM: 401.9  6/9/2013 - Present        Other and unspecified ovarian cyst ICD-10-CM: N83.209  ICD-9-CM: 620.2  12/10/2012 - Present        Endometrial hyperplasia ICD-10-CM: N85.00  ICD-9-CM: 621.30  12/10/2012 - Present        Atypical chest pain ICD-10-CM: R07.89  ICD-9-CM: 786.59  5/31/2012 - Present              Discharge Medications:     Current Discharge Medication List      CONTINUE these medications which have NOT CHANGED    Details   colchicine (COLCRYS) 0.6 mg tablet Take 0.6 mg by mouth daily. simvastatin (ZOCOR) 5 mg tablet Take 5 mg by mouth nightly. olmesartan-hydroCHLOROthiazide (BENICAR HCT) 20-12.5 mg per tablet Take 1 Tab by mouth daily.       HYDROcodone-acetaminophen (NORCO) 5-325 mg per tablet Take 1 Tab by mouth as needed for Pain. folic acid/multivit-min/lutein (CENTRUM SILVER PO) Take  by mouth daily. acetaminophen (TYLENOL EXTRA STRENGTH) 500 mg tablet Take 2 Tabs by mouth every six (6) hours as needed for Pain. Qty: 20 Tab, Refills: 0      Dexlansoprazole (DEXILANT) 60 mg CpDB Take 60 mg by mouth as needed. potassium chloride (KLOR-CON) 20 mEq packet Take 20 mEq by mouth daily. dipyridamole-aspirin (AGGRENOX) 200-25 mg per SR capsule Take 1 Cap by mouth daily. meclizine (ANTIVERT) 12.5 mg tablet Take 12.5 mg by mouth three (3) times daily as needed. promethazine (PHENERGAN) 25 mg tablet Take 25 mg by mouth every six (6) hours as needed for Nausea. traMADol (ULTRAM) 50 mg tablet Take 50 mg by mouth as needed for Pain. calcium carbonate (TUMS) 200 mg calcium (500 mg) Chew Take 1 Tab by mouth as needed. STOP taking these medications       metoprolol tartrate (LOPRESSOR) 25 mg tablet Comments:   Reason for Stopping:         ranolazine ER (RANEXA) 500 mg SR tablet Comments:   Reason for Stopping:               Discharge Condition: Stable    Procedures : LHC and coronary angiogram     Consults: None       PHYSICAL EXAM   Visit Vitals  /58 (BP 1 Location: Right arm, BP Patient Position: At rest;Supine)   Pulse (!) 49   Temp 98.6 °F (37 °C)   Resp 14   Ht 5' 4\" (1.626 m)   Wt 102.3 kg (225 lb 8 oz)   SpO2 99%   Breastfeeding? No   BMI 38.71 kg/m²     General: Awake, cooperative, no acute distress    HEENT: NC, Atraumatic. PERRLA, EOMI. Anicteric sclerae. Lungs:  CTA Bilaterally. No Wheezing/Rhonchi/Rales. Heart:  Regular  rhythm,  No murmur, No Rubs, No Gallops  Abdomen: Soft, Non distended, Non tender. +Bowel sounds,   Extremities: No c/c/e  Psych:   Not anxious or agitated. Neurologic:  No acute neurological deficits.                                      Admission HPI : See H/P    Hospital Course : Patient came for out patient LHC and coronary angiogram. C and Coronary angiogram done via left radial approach. Coronary anatomy described below with noted coronary artery disease. Left main is normal and bifurcates in LAD and LCx. Proximal LAD has luminal irregularities. D1 is small to medium caliber, D2 is very small caliber and D3 is small caliber vessel and are normal. LAD/D has corkscrew appearance. LCx is non dominant vessel. No intraluminal narrowing seen. OM1 large caliber vessel and is normal. LCx/OM has corkscrew appearance. RCA is dominant vessel. RCA is normal. RPDA is large caliber vessel and is normal. RPLA is small to medium caliber vessel and is normal. RCA has corkscrew appearance. LV gram was not done. LVEDP 7-8 mm hg. No significant gradient on pull back. Patient tolerated procedure well. Scant blood loss. No complications. No specimen removed. Recommendation:    Medication considered:   Aspirin, beta blocker, ACEI, Nitrates and statin     CAD risk factor education  Diet education  Control cholesterol  Blood pressure control  Exercise education: Age and functional status appropriate. Patient can go for hip replacement with low risk for periprocedural cardiovascular event. Activity: No driving for 24 hours. No weight lifting not more than 10 lbs from left hand for 1 week. Diet: Cardiac     Follow-up: In cardiology clinic after 2 weeks    Disposition: Home    Minutes spent on discharge: 30 minutes       Labs: Results:       Chemistry Recent Labs     12/26/18  0912   GLU 99      K 4.1      CO2 24   BUN 22*   CREA 1.20   CA 9.3   AGAP 9   BUCR 18      TP 7.4   ALB 3.7   GLOB 3.7   AGRAT 1.0      CBC w/Diff Recent Labs     12/26/18  0912   WBC 8.8   RBC 3.93*   HGB 11.7*   HCT 35.8         Cardiac Enzymes No results for input(s): CPK, CKND1, ZULEIKA in the last 72 hours.     No lab exists for component: CKRMB, TROIP   Coagulation Recent Labs     12/26/18  0912   PTP 11.8   INR 0.9       Lipid Panel Lab Results   Component Value Date/Time    Cholesterol, total 114 06/10/2013 06:15 AM    HDL Cholesterol 36 (L) 06/10/2013 06:15 AM    LDL, calculated 54.8 06/10/2013 06:15 AM    VLDL, calculated 23.2 06/10/2013 06:15 AM    Triglyceride 116 06/10/2013 06:15 AM    CHOL/HDL Ratio 3.2 06/10/2013 06:15 AM      BNP No results for input(s): BNPP in the last 72 hours. Liver Enzymes Recent Labs     12/26/18  0912   TP 7.4   ALB 3.7      SGOT 15      Thyroid Studies Lab Results   Component Value Date/Time    TSH 1.32 08/20/2010 08:02 PM            Significant Diagnostic Studies: Xr Chest Sngl V    Result Date: 12/11/2018  EXAM: XR CHEST SNGL V INDICATION: Preop left hip surgery COMPARISON: 6/4/2018. FINDINGS: A single view of the chest demonstrates clear lungs. The cardiac and mediastinal contours and pulmonary vascularity are normal. Thoracic aorta is mildly tortuous. Degenerative changes are seen throughout the spine. IMPRESSION: No significant abnormality seen. No new abnormality is seen developing since last study. No results found for this or any previous visit.         CC: Aysha Cormier DO

## 2018-12-26 NOTE — Clinical Note
TRANSFER - IN REPORT:  
 
Verbal report received from: GERALDO Walton. Report consisted of patient's Situation, Background, Assessment and  
Recommendations(SBAR). Opportunity for questions and clarification was provided. Assessment completed upon patient's arrival to unit and care assumed. Patient transported with a Registered Nurse and [address] [place] [address] / [facility] [employer].

## 2018-12-26 NOTE — PROGRESS NOTES
Pt is all prepped and ready for procedure. 1120 Pt back to care unit S/P cardiac cath. Procedure tolerated well. TR band to lt arm with immobilizer in place. Site WNL. 1255 TR band removed and site WNL. Immobilizer back in place. Education provided and understandings verbalized.

## 2018-12-26 NOTE — PROCEDURES
LHC and Coronary angiogram done via left radial approach. Coronary anatomy described below with noted coronary artery disease. Left main is normal and bifurcates in LAD and LCx. Proximal LAD has luminal irregularities. D1 is small to medium caliber, D2 is very small caliber and D3 is small caliber vessel and are normal. LAD/D has corkscrew appearance. LCx is non dominant vessel. No intraluminal narrowing seen. OM1 large caliber vessel and is normal. LCx/OM has corkscrew appearance. RCA is dominant vessel. RCA is normal. RPDA is large caliber vessel and is normal. RPLA is small to medium caliber vessel and is normal. RCA has corkscrew appearance. LV gram was not done. LVEDP 7-8 mm hg. No significant gradient on pull back. Patient tolerated procedure well. Scant blood loss. No complications. No specimen removed. Recommendation:    Medication considered:   Aspirin, beta blocker, ACEI, Nitrates and statin     CAD risk factor education  Diet education  Control cholesterol  Blood pressure control  Exercise education: Age and functional status appropriate. Patient can go for hip replacement with low risk for periprocedural cardiovascular event.

## 2018-12-26 NOTE — Clinical Note
Contrast Dose Calculator:  
Patient's age: 80.  
Patient's sex: Female. Patient weight (kg) = 102.3. Creatinine level (mg/dL) = 0.93. Creatinine clearance (mL/min): 74.02. Max Contrast dose per Creatinine Cl calculator = 166.55 mL.

## 2018-12-26 NOTE — H&P
Date of Surgery Update:  Darcy Greco was seen and examined. History and physical has been reviewed. The patient has been examined. There have been no significant clinical changes since the completion of the originally dated History and Physical.    Patient assessed and is candidate for moderate sedation.       Signed By: Mario Barlow MD     December 26, 2018 10:40 AM

## 2018-12-26 NOTE — DISCHARGE INSTRUCTIONS
Cardiac Catheterization/Angiography Discharge Instructions    *Check the puncture site frequently for swelling or bleeding. If you see any bleeding, lie down and apply pressure over the area with a clean town or washcloth. Notify your doctor for any redness, swelling, drainage or oozing from the puncture site. Notify your doctor for any fever or chills. *If the leg or arm with the puncture becomes cold, numb or painful, call Dr Razia Ladd     *Activity should be limited for the next 48 hours. Climb stairs as little as possible and avoid any stooping, bending or strenuous activity for 48 hours. No heavy lifting (anything over 10 pounds) for three days. *Do not drive for 48 hours. *You may resume your usual diet. Drink more fluids than usual.    *Have a responsible person drive you home and stay with you for at least 24 hours after your heart catheterization/angiography. *You may remove the bandage from your Left and Arm in 24 hours. You may shower in 24 hours. No tub baths, hot tubs or swimming for one week. Do not place any lotions, creams, powders, ointments over the puncture site for one week. You may place a clean band-aid over the puncture site each day for 5 days. Change this daily. DISCHARGE SUMMARY from Nurse    PATIENT INSTRUCTIONS:    After general anesthesia or intravenous sedation, for 24 hours or while taking prescription Narcotics:  · Limit your activities  · Do not drive and operate hazardous machinery  · Do not make important personal or business decisions  · Do  not drink alcoholic beverages  · If you have not urinated within 8 hours after discharge, please contact your surgeon on call.     Report the following to your surgeon:  · Excessive pain, swelling, redness or odor of or around the surgical area  · Temperature over 100.5  · Nausea and vomiting lasting longer than 4 hours or if unable to take medications  · Any signs of decreased circulation or nerve impairment to extremity: change in color, persistent  numbness, tingling, coldness or increase pain  · Any questions    What to do at Home:  Recommended activity: Activity as tolerated,       *  Please give a list of your current medications to your Primary Care Provider. *  Please update this list whenever your medications are discontinued, doses are      changed, or new medications (including over-the-counter products) are added. *  Please carry medication information at all times in case of emergency situations. These are general instructions for a healthy lifestyle:    No smoking/ No tobacco products/ Avoid exposure to second hand smoke  Surgeon General's Warning:  Quitting smoking now greatly reduces serious risk to your health. Obesity, smoking, and sedentary lifestyle greatly increases your risk for illness    A healthy diet, regular physical exercise & weight monitoring are important for maintaining a healthy lifestyle    You may be retaining fluid if you have a history of heart failure or if you experience any of the following symptoms:  Weight gain of 3 pounds or more overnight or 5 pounds in a week, increased swelling in our hands or feet or shortness of breath while lying flat in bed. Please call your doctor as soon as you notice any of these symptoms; do not wait until your next office visit. Recognize signs and symptoms of STROKE:    F-face looks uneven    A-arms unable to move or move unevenly    S-speech slurred or non-existent    T-time-call 911 as soon as signs and symptoms begin-DO NOT go       Back to bed or wait to see if you get better-TIME IS BRAIN. Warning Signs of HEART ATTACK     Call 911 if you have these symptoms:   Chest discomfort. Most heart attacks involve discomfort in the center of the chest that lasts more than a few minutes, or that goes away and comes back. It can feel like uncomfortable pressure, squeezing, fullness, or pain.    Discomfort in other areas of the upper body. Symptoms can include pain or discomfort in one or both arms, the back, neck, jaw, or stomach.  Shortness of breath with or without chest discomfort.  Other signs may include breaking out in a cold sweat, nausea, or lightheadedness. Don't wait more than five minutes to call 911 - MINUTES MATTER! Fast action can save your life. Calling 911 is almost always the fastest way to get lifesaving treatment. Emergency Medical Services staff can begin treatment when they arrive -- up to an hour sooner than if someone gets to the hospital by car. The discharge information has been reviewed with the patient and caregiver. The patient and caregiver verbalized understanding. Discharge medications reviewed with the patient and caregiver and appropriate educational materials and side effects teaching were provided.       Patient armband removed and shredded    ___________________________________________________________________________________________________________________________________

## 2019-01-11 ENCOUNTER — ANESTHESIA EVENT (OUTPATIENT)
Dept: SURGERY | Age: 84
DRG: 470 | End: 2019-01-11
Payer: MEDICARE

## 2019-01-11 ENCOUNTER — ANESTHESIA (OUTPATIENT)
Dept: SURGERY | Age: 84
DRG: 470 | End: 2019-01-11
Payer: MEDICARE

## 2019-01-11 ENCOUNTER — HOSPITAL ENCOUNTER (INPATIENT)
Age: 84
LOS: 2 days | Discharge: HOME HEALTH | DRG: 470 | End: 2019-01-13
Attending: ORTHOPAEDIC SURGERY | Admitting: ORTHOPAEDIC SURGERY
Payer: MEDICARE

## 2019-01-11 ENCOUNTER — APPOINTMENT (OUTPATIENT)
Dept: GENERAL RADIOLOGY | Age: 84
DRG: 470 | End: 2019-01-11
Attending: PHYSICIAN ASSISTANT
Payer: MEDICARE

## 2019-01-11 ENCOUNTER — APPOINTMENT (OUTPATIENT)
Dept: GENERAL RADIOLOGY | Age: 84
DRG: 470 | End: 2019-01-11
Attending: ORTHOPAEDIC SURGERY
Payer: MEDICARE

## 2019-01-11 DIAGNOSIS — Z96.642 H/O TOTAL HIP ARTHROPLASTY, LEFT: Primary | ICD-10-CM

## 2019-01-11 PROCEDURE — 74011000250 HC RX REV CODE- 250

## 2019-01-11 PROCEDURE — 77030002916 HC SUT ETHLN J&J -A: Performed by: ORTHOPAEDIC SURGERY

## 2019-01-11 PROCEDURE — 74011250636 HC RX REV CODE- 250/636

## 2019-01-11 PROCEDURE — 77030011256 HC DRSG MEPILEX <16IN NO BORD MOLN -A: Performed by: ORTHOPAEDIC SURGERY

## 2019-01-11 PROCEDURE — 77030012406 HC DRN WND PENRS BARD -A: Performed by: ORTHOPAEDIC SURGERY

## 2019-01-11 PROCEDURE — 77030002912 HC SUT ETHBND J&J -A: Performed by: ORTHOPAEDIC SURGERY

## 2019-01-11 PROCEDURE — 77030018836 HC SOL IRR NACL ICUM -A: Performed by: ORTHOPAEDIC SURGERY

## 2019-01-11 PROCEDURE — 77030006643: Performed by: SPECIALIST

## 2019-01-11 PROCEDURE — 74011000250 HC RX REV CODE- 250: Performed by: ORTHOPAEDIC SURGERY

## 2019-01-11 PROCEDURE — 77030032490 HC SLV COMPR SCD KNE COVD -B: Performed by: ORTHOPAEDIC SURGERY

## 2019-01-11 PROCEDURE — 76010000132 HC OR TIME 2.5 TO 3 HR: Performed by: ORTHOPAEDIC SURGERY

## 2019-01-11 PROCEDURE — 74011250636 HC RX REV CODE- 250/636: Performed by: PHYSICIAN ASSISTANT

## 2019-01-11 PROCEDURE — 77030037875 HC DRSG MEPILEX <16IN BORD MOLN -A: Performed by: ORTHOPAEDIC SURGERY

## 2019-01-11 PROCEDURE — 77030020268 HC MISC GENERAL SUPPLY: Performed by: ORTHOPAEDIC SURGERY

## 2019-01-11 PROCEDURE — 74011250636 HC RX REV CODE- 250/636: Performed by: ORTHOPAEDIC SURGERY

## 2019-01-11 PROCEDURE — 77030002933 HC SUT MCRYL J&J -A: Performed by: ORTHOPAEDIC SURGERY

## 2019-01-11 PROCEDURE — 65270000029 HC RM PRIVATE

## 2019-01-11 PROCEDURE — 74011250637 HC RX REV CODE- 250/637: Performed by: SPECIALIST

## 2019-01-11 PROCEDURE — 77030038010: Performed by: ORTHOPAEDIC SURGERY

## 2019-01-11 PROCEDURE — 77030020262 HC SOL INJ SOD CL 0.9% 100ML: Performed by: ORTHOPAEDIC SURGERY

## 2019-01-11 PROCEDURE — 77030031139 HC SUT VCRL2 J&J -A: Performed by: ORTHOPAEDIC SURGERY

## 2019-01-11 PROCEDURE — 77030022295 HC SEAL BPLR VSL DISP MEDT -F: Performed by: ORTHOPAEDIC SURGERY

## 2019-01-11 PROCEDURE — 74011250636 HC RX REV CODE- 250/636: Performed by: SPECIALIST

## 2019-01-11 PROCEDURE — C1776 JOINT DEVICE (IMPLANTABLE): HCPCS | Performed by: ORTHOPAEDIC SURGERY

## 2019-01-11 PROCEDURE — 76060000036 HC ANESTHESIA 2.5 TO 3 HR: Performed by: ORTHOPAEDIC SURGERY

## 2019-01-11 PROCEDURE — 73501 X-RAY EXAM HIP UNI 1 VIEW: CPT

## 2019-01-11 PROCEDURE — 76210000006 HC OR PH I REC 0.5 TO 1 HR: Performed by: ORTHOPAEDIC SURGERY

## 2019-01-11 PROCEDURE — 77030020407 HC IV BLD WRMR ST 3M -A: Performed by: SPECIALIST

## 2019-01-11 PROCEDURE — C9290 INJ, BUPIVACAINE LIPOSOME: HCPCS | Performed by: ORTHOPAEDIC SURGERY

## 2019-01-11 PROCEDURE — 77030027138 HC INCENT SPIROMETER -A: Performed by: ORTHOPAEDIC SURGERY

## 2019-01-11 PROCEDURE — 77030008683 HC TU ET CUF COVD -A: Performed by: SPECIALIST

## 2019-01-11 PROCEDURE — 77010033678 HC OXYGEN DAILY

## 2019-01-11 PROCEDURE — 74011250637 HC RX REV CODE- 250/637: Performed by: PHYSICIAN ASSISTANT

## 2019-01-11 PROCEDURE — 0SRB04A REPLACEMENT OF LEFT HIP JOINT WITH CERAMIC ON POLYETHYLENE SYNTHETIC SUBSTITUTE, UNCEMENTED, OPEN APPROACH: ICD-10-PCS | Performed by: ORTHOPAEDIC SURGERY

## 2019-01-11 PROCEDURE — 77030008477 HC STYL SATN SLP COVD -A: Performed by: SPECIALIST

## 2019-01-11 PROCEDURE — 77030020782 HC GWN BAIR PAWS FLX 3M -B: Performed by: ORTHOPAEDIC SURGERY

## 2019-01-11 PROCEDURE — 74011000258 HC RX REV CODE- 258: Performed by: ORTHOPAEDIC SURGERY

## 2019-01-11 DEVICE — ELIMINATOR H APEX FOR 48-60MM PINN HIP SHELL: Type: IMPLANTABLE DEVICE | Site: HIP | Status: FUNCTIONAL

## 2019-01-11 DEVICE — COMPONENT TOT HIP PRIMARY CERM ALTRX: Type: IMPLANTABLE DEVICE | Site: HIP | Status: FUNCTIONAL

## 2019-01-11 DEVICE — CUP ACET DIA52MM HIP GRIPTION PRI CEMENTLESS FIX SECT SER: Type: IMPLANTABLE DEVICE | Site: HIP | Status: FUNCTIONAL

## 2019-01-11 DEVICE — SCREW BNE L20MM DIA6.5MM CANC HIP S STL GRIPTION FULL THRD: Type: IMPLANTABLE DEVICE | Site: HIP | Status: FUNCTIONAL

## 2019-01-11 DEVICE — STEM FEM SZ 4 L103MM 12/14 TAPR HI OFFSET HIP DUOFIX CLLRD: Type: IMPLANTABLE DEVICE | Site: HIP | Status: FUNCTIONAL

## 2019-01-11 DEVICE — LINER ACET OD52MM ID36MM HIP ALTRX PINN: Type: IMPLANTABLE DEVICE | Site: HIP | Status: FUNCTIONAL

## 2019-01-11 DEVICE — HEAD FEM DIA36MM +8MM OFFSET 12/14 TAPR HIP CERAMIC BIOLOX: Type: IMPLANTABLE DEVICE | Site: HIP | Status: FUNCTIONAL

## 2019-01-11 RX ORDER — OLMESARTAN MEDOXOMIL 20 MG/1
20 TABLET ORAL DAILY
Status: DISCONTINUED | OUTPATIENT
Start: 2019-01-12 | End: 2019-01-13 | Stop reason: HOSPADM

## 2019-01-11 RX ORDER — SODIUM CHLORIDE 0.9 % (FLUSH) 0.9 %
5-40 SYRINGE (ML) INJECTION AS NEEDED
Status: DISCONTINUED | OUTPATIENT
Start: 2019-01-11 | End: 2019-01-13 | Stop reason: HOSPADM

## 2019-01-11 RX ORDER — LANOLIN ALCOHOL/MO/W.PET/CERES
1 CREAM (GRAM) TOPICAL
Status: DISCONTINUED | OUTPATIENT
Start: 2019-01-12 | End: 2019-01-13 | Stop reason: HOSPADM

## 2019-01-11 RX ORDER — COLCHICINE 0.6 MG/1
0.6 TABLET ORAL AS NEEDED
Status: DISCONTINUED | OUTPATIENT
Start: 2019-01-11 | End: 2019-01-13 | Stop reason: HOSPADM

## 2019-01-11 RX ORDER — CEFAZOLIN SODIUM 2 G/50ML
2 SOLUTION INTRAVENOUS ONCE
Status: COMPLETED | OUTPATIENT
Start: 2019-01-11 | End: 2019-01-11

## 2019-01-11 RX ORDER — SODIUM CHLORIDE 0.9 % (FLUSH) 0.9 %
5-40 SYRINGE (ML) INJECTION EVERY 8 HOURS
Status: DISCONTINUED | OUTPATIENT
Start: 2019-01-11 | End: 2019-01-13 | Stop reason: HOSPADM

## 2019-01-11 RX ORDER — PHENYLEPHRINE HCL IN 0.9% NACL 1 MG/10 ML
SYRINGE (ML) INTRAVENOUS AS NEEDED
Status: DISCONTINUED | OUTPATIENT
Start: 2019-01-11 | End: 2019-01-11 | Stop reason: HOSPADM

## 2019-01-11 RX ORDER — CEFAZOLIN SODIUM 2 G/50ML
2 SOLUTION INTRAVENOUS EVERY 8 HOURS
Status: COMPLETED | OUTPATIENT
Start: 2019-01-11 | End: 2019-01-12

## 2019-01-11 RX ORDER — SODIUM CHLORIDE 9 MG/ML
125 INJECTION, SOLUTION INTRAVENOUS CONTINUOUS
Status: DISCONTINUED | OUTPATIENT
Start: 2019-01-11 | End: 2019-01-11 | Stop reason: HOSPADM

## 2019-01-11 RX ORDER — SODIUM CHLORIDE, SODIUM LACTATE, POTASSIUM CHLORIDE, CALCIUM CHLORIDE 600; 310; 30; 20 MG/100ML; MG/100ML; MG/100ML; MG/100ML
125 INJECTION, SOLUTION INTRAVENOUS CONTINUOUS
Status: DISCONTINUED | OUTPATIENT
Start: 2019-01-11 | End: 2019-01-11

## 2019-01-11 RX ORDER — DEXAMETHASONE SODIUM PHOSPHATE 4 MG/ML
4 INJECTION, SOLUTION INTRA-ARTICULAR; INTRALESIONAL; INTRAMUSCULAR; INTRAVENOUS; SOFT TISSUE ONCE
Status: COMPLETED | OUTPATIENT
Start: 2019-01-11 | End: 2019-01-11

## 2019-01-11 RX ORDER — ASPIRIN AND DIPYRIDAMOLE 25; 200 MG/1; MG/1
1 CAPSULE, EXTENDED RELEASE ORAL DAILY
Status: DISCONTINUED | OUTPATIENT
Start: 2019-01-12 | End: 2019-01-13 | Stop reason: HOSPADM

## 2019-01-11 RX ORDER — OXYCODONE HYDROCHLORIDE 5 MG/1
10 TABLET ORAL
Status: DISCONTINUED | OUTPATIENT
Start: 2019-01-11 | End: 2019-01-13 | Stop reason: HOSPADM

## 2019-01-11 RX ORDER — ONDANSETRON 2 MG/ML
4 INJECTION INTRAMUSCULAR; INTRAVENOUS
Status: DISCONTINUED | OUTPATIENT
Start: 2019-01-11 | End: 2019-01-13 | Stop reason: HOSPADM

## 2019-01-11 RX ORDER — ONDANSETRON 2 MG/ML
INJECTION INTRAMUSCULAR; INTRAVENOUS AS NEEDED
Status: DISCONTINUED | OUTPATIENT
Start: 2019-01-11 | End: 2019-01-11 | Stop reason: HOSPADM

## 2019-01-11 RX ORDER — NEOSTIGMINE METHYLSULFATE 5 MG/5 ML
SYRINGE (ML) INTRAVENOUS AS NEEDED
Status: DISCONTINUED | OUTPATIENT
Start: 2019-01-11 | End: 2019-01-11 | Stop reason: HOSPADM

## 2019-01-11 RX ORDER — GLYCOPYRROLATE 0.2 MG/ML
INJECTION INTRAMUSCULAR; INTRAVENOUS AS NEEDED
Status: DISCONTINUED | OUTPATIENT
Start: 2019-01-11 | End: 2019-01-11 | Stop reason: HOSPADM

## 2019-01-11 RX ORDER — SIMVASTATIN 10 MG/1
5 TABLET, FILM COATED ORAL
Status: DISCONTINUED | OUTPATIENT
Start: 2019-01-11 | End: 2019-01-13 | Stop reason: HOSPADM

## 2019-01-11 RX ORDER — KETAMINE HYDROCHLORIDE 10 MG/ML
INJECTION, SOLUTION INTRAMUSCULAR; INTRAVENOUS AS NEEDED
Status: DISCONTINUED | OUTPATIENT
Start: 2019-01-11 | End: 2019-01-11 | Stop reason: HOSPADM

## 2019-01-11 RX ORDER — ACETAMINOPHEN 500 MG
1000 TABLET ORAL EVERY 8 HOURS
Status: DISCONTINUED | OUTPATIENT
Start: 2019-01-11 | End: 2019-01-13 | Stop reason: HOSPADM

## 2019-01-11 RX ORDER — DEXAMETHASONE SODIUM PHOSPHATE 4 MG/ML
INJECTION, SOLUTION INTRA-ARTICULAR; INTRALESIONAL; INTRAMUSCULAR; INTRAVENOUS; SOFT TISSUE AS NEEDED
Status: DISCONTINUED | OUTPATIENT
Start: 2019-01-11 | End: 2019-01-11 | Stop reason: HOSPADM

## 2019-01-11 RX ORDER — KETOROLAC TROMETHAMINE 30 MG/ML
15 INJECTION, SOLUTION INTRAMUSCULAR; INTRAVENOUS
Status: DISCONTINUED | OUTPATIENT
Start: 2019-01-11 | End: 2019-01-11 | Stop reason: HOSPADM

## 2019-01-11 RX ORDER — DIPHENHYDRAMINE HCL 25 MG
25 CAPSULE ORAL
Status: DISCONTINUED | OUTPATIENT
Start: 2019-01-11 | End: 2019-01-13 | Stop reason: HOSPADM

## 2019-01-11 RX ORDER — HYDROMORPHONE HYDROCHLORIDE 2 MG/ML
0.2 INJECTION, SOLUTION INTRAMUSCULAR; INTRAVENOUS; SUBCUTANEOUS
Status: DISCONTINUED | OUTPATIENT
Start: 2019-01-11 | End: 2019-01-11 | Stop reason: HOSPADM

## 2019-01-11 RX ORDER — HYDROCHLOROTHIAZIDE 12.5 MG/1
12.5 CAPSULE ORAL DAILY
Status: DISCONTINUED | OUTPATIENT
Start: 2019-01-12 | End: 2019-01-13 | Stop reason: HOSPADM

## 2019-01-11 RX ORDER — MAGNESIUM SULFATE 100 %
4 CRYSTALS MISCELLANEOUS AS NEEDED
Status: DISCONTINUED | OUTPATIENT
Start: 2019-01-11 | End: 2019-01-11 | Stop reason: HOSPADM

## 2019-01-11 RX ORDER — NALOXONE HYDROCHLORIDE 0.4 MG/ML
0.4 INJECTION, SOLUTION INTRAMUSCULAR; INTRAVENOUS; SUBCUTANEOUS AS NEEDED
Status: DISCONTINUED | OUTPATIENT
Start: 2019-01-11 | End: 2019-01-13 | Stop reason: HOSPADM

## 2019-01-11 RX ORDER — FENTANYL CITRATE 50 UG/ML
25 INJECTION, SOLUTION INTRAMUSCULAR; INTRAVENOUS AS NEEDED
Status: DISCONTINUED | OUTPATIENT
Start: 2019-01-11 | End: 2019-01-11 | Stop reason: HOSPADM

## 2019-01-11 RX ORDER — NALOXONE HYDROCHLORIDE 0.4 MG/ML
0.1 INJECTION, SOLUTION INTRAMUSCULAR; INTRAVENOUS; SUBCUTANEOUS AS NEEDED
Status: DISCONTINUED | OUTPATIENT
Start: 2019-01-11 | End: 2019-01-11 | Stop reason: HOSPADM

## 2019-01-11 RX ORDER — AMOXICILLIN 250 MG
1 CAPSULE ORAL 2 TIMES DAILY
Status: DISCONTINUED | OUTPATIENT
Start: 2019-01-11 | End: 2019-01-13 | Stop reason: HOSPADM

## 2019-01-11 RX ORDER — MORPHINE SULFATE 4 MG/ML
1 INJECTION, SOLUTION INTRAMUSCULAR; INTRAVENOUS
Status: DISCONTINUED | OUTPATIENT
Start: 2019-01-11 | End: 2019-01-11 | Stop reason: HOSPADM

## 2019-01-11 RX ORDER — CELECOXIB 100 MG/1
100 CAPSULE ORAL ONCE
Status: COMPLETED | OUTPATIENT
Start: 2019-01-11 | End: 2019-01-11

## 2019-01-11 RX ORDER — DEXTROSE 50 % IN WATER (D50W) INTRAVENOUS SYRINGE
25-50 AS NEEDED
Status: DISCONTINUED | OUTPATIENT
Start: 2019-01-11 | End: 2019-01-11 | Stop reason: HOSPADM

## 2019-01-11 RX ORDER — LORAZEPAM 0.5 MG/1
0.5 TABLET ORAL
Status: DISCONTINUED | OUTPATIENT
Start: 2019-01-11 | End: 2019-01-13 | Stop reason: HOSPADM

## 2019-01-11 RX ORDER — LIDOCAINE HYDROCHLORIDE 20 MG/ML
INJECTION, SOLUTION EPIDURAL; INFILTRATION; INTRACAUDAL; PERINEURAL AS NEEDED
Status: DISCONTINUED | OUTPATIENT
Start: 2019-01-11 | End: 2019-01-11 | Stop reason: HOSPADM

## 2019-01-11 RX ORDER — MIDAZOLAM HYDROCHLORIDE 1 MG/ML
INJECTION, SOLUTION INTRAMUSCULAR; INTRAVENOUS AS NEEDED
Status: DISCONTINUED | OUTPATIENT
Start: 2019-01-11 | End: 2019-01-11 | Stop reason: HOSPADM

## 2019-01-11 RX ORDER — FENTANYL CITRATE 50 UG/ML
50 INJECTION, SOLUTION INTRAMUSCULAR; INTRAVENOUS
Status: DISCONTINUED | OUTPATIENT
Start: 2019-01-11 | End: 2019-01-11 | Stop reason: HOSPADM

## 2019-01-11 RX ORDER — ACETAMINOPHEN 500 MG
1000 TABLET ORAL ONCE
Status: COMPLETED | OUTPATIENT
Start: 2019-01-11 | End: 2019-01-11

## 2019-01-11 RX ORDER — TRANEXAMIC ACID 100 MG/ML
2 INJECTION, SOLUTION INTRAVENOUS ONCE
Status: COMPLETED | OUTPATIENT
Start: 2019-01-11 | End: 2019-01-11

## 2019-01-11 RX ORDER — HYDROMORPHONE HYDROCHLORIDE 2 MG/ML
INJECTION, SOLUTION INTRAMUSCULAR; INTRAVENOUS; SUBCUTANEOUS AS NEEDED
Status: DISCONTINUED | OUTPATIENT
Start: 2019-01-11 | End: 2019-01-11 | Stop reason: HOSPADM

## 2019-01-11 RX ORDER — PANTOPRAZOLE SODIUM 40 MG/1
40 TABLET, DELAYED RELEASE ORAL DAILY PRN
Status: DISCONTINUED | OUTPATIENT
Start: 2019-01-11 | End: 2019-01-13 | Stop reason: HOSPADM

## 2019-01-11 RX ORDER — METOCLOPRAMIDE HYDROCHLORIDE 5 MG/ML
INJECTION INTRAMUSCULAR; INTRAVENOUS AS NEEDED
Status: DISCONTINUED | OUTPATIENT
Start: 2019-01-11 | End: 2019-01-11 | Stop reason: HOSPADM

## 2019-01-11 RX ORDER — SODIUM CHLORIDE, SODIUM LACTATE, POTASSIUM CHLORIDE, CALCIUM CHLORIDE 600; 310; 30; 20 MG/100ML; MG/100ML; MG/100ML; MG/100ML
125 INJECTION, SOLUTION INTRAVENOUS CONTINUOUS
Status: DISPENSED | OUTPATIENT
Start: 2019-01-11 | End: 2019-01-12

## 2019-01-11 RX ORDER — ROCURONIUM BROMIDE 10 MG/ML
INJECTION, SOLUTION INTRAVENOUS AS NEEDED
Status: DISCONTINUED | OUTPATIENT
Start: 2019-01-11 | End: 2019-01-11 | Stop reason: HOSPADM

## 2019-01-11 RX ORDER — OXYCODONE HYDROCHLORIDE 5 MG/1
5 TABLET ORAL
Status: DISCONTINUED | OUTPATIENT
Start: 2019-01-11 | End: 2019-01-13 | Stop reason: HOSPADM

## 2019-01-11 RX ORDER — ONDANSETRON 2 MG/ML
4 INJECTION INTRAMUSCULAR; INTRAVENOUS ONCE
Status: DISCONTINUED | OUTPATIENT
Start: 2019-01-11 | End: 2019-01-11 | Stop reason: HOSPADM

## 2019-01-11 RX ORDER — PROPOFOL 10 MG/ML
INJECTION, EMULSION INTRAVENOUS AS NEEDED
Status: DISCONTINUED | OUTPATIENT
Start: 2019-01-11 | End: 2019-01-11 | Stop reason: HOSPADM

## 2019-01-11 RX ORDER — SODIUM CHLORIDE 0.9 % (FLUSH) 0.9 %
5-40 SYRINGE (ML) INJECTION AS NEEDED
Status: CANCELLED | OUTPATIENT
Start: 2019-01-11

## 2019-01-11 RX ORDER — SODIUM CHLORIDE 0.9 % (FLUSH) 0.9 %
5-40 SYRINGE (ML) INJECTION EVERY 8 HOURS
Status: CANCELLED | OUTPATIENT
Start: 2019-01-11

## 2019-01-11 RX ORDER — KETOROLAC TROMETHAMINE 15 MG/ML
15 INJECTION, SOLUTION INTRAMUSCULAR; INTRAVENOUS
Status: DISCONTINUED | OUTPATIENT
Start: 2019-01-11 | End: 2019-01-13 | Stop reason: HOSPADM

## 2019-01-11 RX ADMIN — HYDROMORPHONE HYDROCHLORIDE 0.5 MG: 2 INJECTION, SOLUTION INTRAMUSCULAR; INTRAVENOUS; SUBCUTANEOUS at 14:02

## 2019-01-11 RX ADMIN — KETAMINE HYDROCHLORIDE 20 MG: 10 INJECTION, SOLUTION INTRAMUSCULAR; INTRAVENOUS at 12:39

## 2019-01-11 RX ADMIN — PANTOPRAZOLE SODIUM 40 MG: 40 TABLET, DELAYED RELEASE ORAL at 21:12

## 2019-01-11 RX ADMIN — DEXAMETHASONE SODIUM PHOSPHATE 4 MG: 4 INJECTION, SOLUTION INTRA-ARTICULAR; INTRALESIONAL; INTRAMUSCULAR; INTRAVENOUS; SOFT TISSUE at 14:33

## 2019-01-11 RX ADMIN — KETOROLAC TROMETHAMINE 15 MG: 15 INJECTION, SOLUTION INTRAMUSCULAR; INTRAVENOUS at 16:53

## 2019-01-11 RX ADMIN — KETAMINE HYDROCHLORIDE 20 MG: 10 INJECTION, SOLUTION INTRAMUSCULAR; INTRAVENOUS at 12:10

## 2019-01-11 RX ADMIN — ROCURONIUM BROMIDE 50 MG: 10 INJECTION, SOLUTION INTRAVENOUS at 12:11

## 2019-01-11 RX ADMIN — ROCURONIUM BROMIDE 10 MG: 10 INJECTION, SOLUTION INTRAVENOUS at 13:06

## 2019-01-11 RX ADMIN — MIDAZOLAM HYDROCHLORIDE 2 MG: 1 INJECTION, SOLUTION INTRAMUSCULAR; INTRAVENOUS at 12:03

## 2019-01-11 RX ADMIN — Medication 100 MCG: at 14:06

## 2019-01-11 RX ADMIN — SODIUM CHLORIDE, SODIUM LACTATE, POTASSIUM CHLORIDE, AND CALCIUM CHLORIDE 125 ML/HR: 600; 310; 30; 20 INJECTION, SOLUTION INTRAVENOUS at 11:13

## 2019-01-11 RX ADMIN — SODIUM CHLORIDE, SODIUM LACTATE, POTASSIUM CHLORIDE, AND CALCIUM CHLORIDE: 600; 310; 30; 20 INJECTION, SOLUTION INTRAVENOUS at 12:59

## 2019-01-11 RX ADMIN — SODIUM CHLORIDE, SODIUM LACTATE, POTASSIUM CHLORIDE, AND CALCIUM CHLORIDE: 600; 310; 30; 20 INJECTION, SOLUTION INTRAVENOUS at 12:36

## 2019-01-11 RX ADMIN — ACETAMINOPHEN 1000 MG: 500 TABLET ORAL at 11:43

## 2019-01-11 RX ADMIN — ACETAMINOPHEN 1000 MG: 500 TABLET, FILM COATED ORAL at 21:13

## 2019-01-11 RX ADMIN — LIDOCAINE HYDROCHLORIDE 100 MG: 20 INJECTION, SOLUTION EPIDURAL; INFILTRATION; INTRACAUDAL; PERINEURAL at 12:10

## 2019-01-11 RX ADMIN — ACETAMINOPHEN 1000 MG: 500 TABLET, FILM COATED ORAL at 16:53

## 2019-01-11 RX ADMIN — GLYCOPYRROLATE 0.2 MG: 0.2 INJECTION INTRAMUSCULAR; INTRAVENOUS at 12:03

## 2019-01-11 RX ADMIN — GLYCOPYRROLATE 0.8 MG: 0.2 INJECTION INTRAMUSCULAR; INTRAVENOUS at 13:56

## 2019-01-11 RX ADMIN — SENNOSIDES AND DOCUSATE SODIUM 1 TABLET: 8.6; 5 TABLET ORAL at 21:12

## 2019-01-11 RX ADMIN — SIMVASTATIN 5 MG: 10 TABLET, FILM COATED ORAL at 21:13

## 2019-01-11 RX ADMIN — ONDANSETRON 4 MG: 2 INJECTION INTRAMUSCULAR; INTRAVENOUS at 13:35

## 2019-01-11 RX ADMIN — PROPOFOL 100 MG: 10 INJECTION, EMULSION INTRAVENOUS at 12:10

## 2019-01-11 RX ADMIN — SODIUM CHLORIDE, SODIUM LACTATE, POTASSIUM CHLORIDE, AND CALCIUM CHLORIDE: 600; 310; 30; 20 INJECTION, SOLUTION INTRAVENOUS at 13:48

## 2019-01-11 RX ADMIN — SODIUM CHLORIDE, SODIUM LACTATE, POTASSIUM CHLORIDE, AND CALCIUM CHLORIDE 1000 ML: 600; 310; 30; 20 INJECTION, SOLUTION INTRAVENOUS at 11:13

## 2019-01-11 RX ADMIN — CEFAZOLIN SODIUM 2 G: 2 SOLUTION INTRAVENOUS at 21:13

## 2019-01-11 RX ADMIN — Medication 100 MCG: at 14:15

## 2019-01-11 RX ADMIN — CELECOXIB 100 MG: 100 CAPSULE ORAL at 11:29

## 2019-01-11 RX ADMIN — DEXAMETHASONE SODIUM PHOSPHATE 4 MG: 4 INJECTION, SOLUTION INTRA-ARTICULAR; INTRALESIONAL; INTRAMUSCULAR; INTRAVENOUS; SOFT TISSUE at 11:28

## 2019-01-11 RX ADMIN — METOCLOPRAMIDE HYDROCHLORIDE 10 MG: 5 INJECTION INTRAMUSCULAR; INTRAVENOUS at 14:32

## 2019-01-11 RX ADMIN — ROCURONIUM BROMIDE 10 MG: 10 INJECTION, SOLUTION INTRAVENOUS at 12:44

## 2019-01-11 RX ADMIN — Medication 100 MCG: at 14:21

## 2019-01-11 RX ADMIN — SODIUM CHLORIDE, SODIUM LACTATE, POTASSIUM CHLORIDE, AND CALCIUM CHLORIDE 125 ML: 600; 310; 30; 20 INJECTION, SOLUTION INTRAVENOUS at 21:13

## 2019-01-11 RX ADMIN — HYDROMORPHONE HYDROCHLORIDE 0.5 MG: 2 INJECTION, SOLUTION INTRAMUSCULAR; INTRAVENOUS; SUBCUTANEOUS at 13:44

## 2019-01-11 RX ADMIN — Medication 4 MG: at 13:56

## 2019-01-11 RX ADMIN — Medication 100 MCG: at 13:54

## 2019-01-11 RX ADMIN — CEFAZOLIN SODIUM 2 G: 2 SOLUTION INTRAVENOUS at 12:06

## 2019-01-11 NOTE — PERIOP NOTES
TRANSFER - OUT REPORT:    Verbal report given to [facility], RN(name) on Lynae Holstein  being transferred to room 207(unit) for routine post - op       Report consisted of patients Situation, Background, Assessment and   Recommendations(SBAR). Information from the following report(s) SBAR was reviewed with the receiving nurse. Intake/Output Summary (Last 24 hours) at 1/11/2019 1559  Last data filed at 1/11/2019 1522  Gross per 24 hour   Intake 3600 ml   Output 600 ml   Net 3000 ml       Lines:   Peripheral IV 01/11/19 Left; Inner Forearm (Active)   Site Assessment Clean, dry, & intact 1/11/2019  3:22 PM   Phlebitis Assessment 0 1/11/2019  3:22 PM   Infiltration Assessment 0 1/11/2019  3:22 PM   Dressing Status Clean, dry, & intact 1/11/2019  3:22 PM   Dressing Type Transparent 1/11/2019  3:22 PM   Hub Color/Line Status Green; Infusing 1/11/2019  3:22 PM        Opportunity for questions and clarification was provided.       Patient transported with:   O2 @ 2 liters

## 2019-01-11 NOTE — H&P
H&P Note    Assessment:   80 y.o. female admitted on 1/11/2019 for H/O total hip arthroplasty, left [W18.413]    Plan:     -Analgesia  -to OR for LEFT DESHAUN    Subjective:    Radha Boles has years of severe left hip pain. She  is a 80 y.o. female admitted on 1/11/2019 for H/O total hip arthroplasty, left [E17.247]. Failed conservative Rx.     No Known Allergies    [unfilled]    Past Medical History:   Diagnosis Date    Arthritis     osteo    Asthma     inhaler as needed - every 2 to 3 years    Chronic pain     knee and hip ,shoulder     GERD (gastroesophageal reflux disease)     Hypertension     5yrs    Irritable bowel     Other ill-defined conditions(799.89)     h/o migraines    Other ill-defined conditions(799.89)     h/o dizzy    Other ill-defined conditions(799.89)     IBS; hiatal hernia    Psychiatric disorder     depression    Stroke ([facility] [address] 9 years ago     stroke - no deficits    Stroke ([facility] [address]     In 1997/98     Past Surgical History:   Procedure Laterality Date    CARDIAC SURG PROCEDURE UNLIST      HX CHOLECYSTECTOMY      HX HERNIA REPAIR      umbilical twice    HX HYSTERECTOMY      HX INTRAOCULAR LENS PROSTHESIS      HX KNEE ARTHROSCOPY      left    HX KNEE REPLACEMENT Left 2013    HX OTHER SURGICAL      herniated disc surgery    HX TUBAL LIGATION      LAP UMBILICAL HERNIA REPAIR      LAP,CHOLECYSTECTOMY      NEUROLOGICAL PROCEDURE UNLISTED      lower back     Family History   Problem Relation Age of Onset    Cancer Father     Malignant Hyperthermia Neg Hx     Pseudocholinesterase Deficiency Neg Hx     Post-op Nausea/Vomiting Neg Hx     Delayed Awakening Neg Hx     Emergence Delirium Neg Hx     Post-op Cognitive Dysfunction Neg Hx     Other Neg Hx      [unfilled]    Review of Systems:  General--Negative for fatigue, weight loss, anorexia  Cardiovascular--Negative for CP, orthopnea, PND  Endocrine--Negative for polyuria, polydipsia, cold intolerance    Vitals:    01/11/19 1033   BP: 143/56   Pulse: 69   Resp: 14   Temp: 97.6 °F (36.4 °C)   SpO2: 96%   Weight: 102.5 kg (226 lb)   Height: 5' 3.5\" (1.613 m)       Physical Exam: General Appearance: Alert and Oriented x3. No acute distress. Conversant. Age-appropriate. Normal mood and affect. Normal inspiratory and expiratory effort. Musculoskeletal: Right Lower extremity:  Skin: intact   Tenderness to palpation right hip  Motor intact knee flexion/extension, ankle plantarflexion and dorsiflexion, toes flex and extend.   Sensory intact L4-S1  Posterior Tibial Pulse 2+, Dorsalis Pedis Pulse 2+, Capillary Refill <2  Compartments soft  No significant edema  Negative Jaiden's Sign      Radiographs: severe DJD RIGHT HIP    Beverly Kwok MD

## 2019-01-11 NOTE — PERIOP NOTES
Patient placed on Thaddeus Paws for a minimum of 30 min in  Preop. Pt. Used restroom in pre-op area with assistance.

## 2019-01-11 NOTE — PERIOP NOTES
Patient transferred to room 207. Charlene Barrett RN assuming care. Blood pressure 133/65, pulse (!) 51, temperature 97.6 °F (36.4 °C), resp. rate 14, height 5' 3.5\" (1.613 m), weight 102.5 kg (226 lb), SpO2 97 %. Dual skin assessment completed.

## 2019-01-11 NOTE — ROUTINE PROCESS
TRANSFER - IN REPORT:    Verbal report received from [facility] on Marck Dukes  being received from PACU for routine post - op. Report consisted of patients Situation, Background, Assessment and   Recommendations(SBAR). Information from the following report(s) SBAR, Kardex, OR Summary, Intake/Output and MAR was reviewed with the receiving nurse. Opportunity for questions and clarification was provided. Assessment to be completed upon patients arrival to unit and care assumed.

## 2019-01-11 NOTE — INTERVAL H&P NOTE
H&P Update:  Hyacinth Westfall was seen and examined. History and physical has been reviewed. The patient has been examined.  There have been no significant clinical changes since the completion of the originally dated History and Physical.    Signed By: Lea Carvajal MD     January 11, 2019 10:57 AM

## 2019-01-11 NOTE — OP NOTES
[address] [place] [address], [phone]     TOTAL HIP REPLACEMENT; APPLICATION OF NEGATIVE PRESSURE WOUND DRESSING      Patient: Bridgette France MRN: [id number]  SSN: [id number]    YOB: 1935  Age: 80 y.o. Sex: female      Date of Surgery: 1/11/2019  Incision Time: 1226  Antibiotic Infusion Time: 2926  Preoperative Diagnosis: LEFT HIP OSTEOARTHRITIS; MORBID OBESITY  Postoperative Diagnosis: LEFT HIP OSTEOARTHRITIS; MORBID OBESITY  Location: [facility]  Surgeon: Jenna Doe MD  Physician Assistant: FABIENNE Jacome was medically necessary for holding of retractors for exposure and to complete the case. Assistant: Circ-1: Karolina Donahue RN  Circ-Relief: Flash Thrasher RN  Physician Assistant: Mishel Weaver PA-C  Radiology Technician: Luis Toribio Tech-2: Luciana Wilson  Retractor Wood: Brenda Weeks    Anesthesia: general    Procedure: Total Left Hip Arthroplasty  (CPT: 69687), modifier 22: 30% additional time and effort due to obese body habitus    Findings: Degenerative joint disease of the left hip. Estimated Blood Loss: 600 mL    Specimens: None    Complications: None    Implants:   Implant Name Type Inv.  Item Serial No.  Lot No. LRB No. Used Action   CUP ACET SECTOR GRIPTION 52MM -- TI - [id number]  CUP ACET SECTOR GRIPTION 52MM -- TI  [facility] [employer] [id number] Left 1 Implanted   PLUG ACET APCL H ELIM POS STP --  - [id number]  PLUG ACET APCL H ELIM POS STP --   [facility] [employer] [id number] Left 1 Implanted   LINER ACET PINN NEUT [id number] -- ALTRX - [id number]  LINER ACET PINN NEUT [id number] -- ALTRX  [facility] [employer] [id number] Left 1 Implanted   SCR ACET CANC PINN 6.5X20MM SS --  - [id number]  SCR ACET CANC PINN 6.5X20MM SS --   [facility] [employer] [id number] Left 1 Implanted   SCR ACET CANC PINN 6.5X20MM SS --  - [id number]  SCR ACET CANC PINN 6.5X20MM SS --   [facility] ORTHOP[employer]ICS [id number] Left 1 Implanted   STEM FEM TAPR SZ4 HI OFFSET -- ACTIS - [id number]  STEM FEM TAPR SZ4 HI OFFSET -- ACTIS  [facility] [employer] [id number] Left 1 Implanted   HEAD FEM CER ARTC EZ 36M+8.5 -- BIOLOX DELTA - [id number]  HEAD FEM CER ARTC EZ 36M+8.5 -- BIOLOX DELTA  [facility] [employer] [id number] Left 1 Implanted       Procedure Detail:  After the patient was brought to the operating suite, She was effectively anesthetized using general anesthesia, then transferred to the [place] table and secured in a standard fashion. Her left hip was then prepped and draped in a normal sterile orthopedic fashion. She was given appropriate intravenous antibiotics preoperatively. After a proper timeout was performed, a direct anterior approach to the hip was performed using a short Manzo-Hunt interval. Anterior capsulotomy was performed. The degenerative changes of the hip were noted. Femoral neck osteotomy was then performed to the templated area. The head and neck were removed. The pulvinar and labrum were excised. The acetabulum was then reamed up to 51 mm with good bleeding cancellous bone obtained. The cup was then irrigated. A 52 mm Gription cup was then impacted in place with excellent stable fixation obtained, placing the cup at about 45 degrees of abduction, 20 degrees of anteversion. 2 screws were placed superiorly. The liner was then impacted in place. Attention was turned to the femur, which was delivered into the wound with a combination of extension, external rotation, and adduction, and using the hook on the [place] table to deliver the femur into the wound. The canal was broached up to a size 4 for the ACTIS stem system with excellent stable fixation obtained. A trial reduction was then performed with the HIGH neck offset and 36 mm head balls with various neck lengths.  With the +8.5, she appeared to have equalization of leg lengths and restoration of offset radiographically, and excellent functional stability was noted. The trial broach was removed. The canal was irrigated. The final components were impacted in place with excellent stable fixation obtained once again. The final reduction was performed and once again leg lengths and offset were restored radiographically, using the C-arm radiographically intraoperatively, and excellent functional stability was noted. 30 cc of 0.25% marcaine and 20 cc of exparel diluted with 40 cc of NS were seperately injected into the soft tissues. The wound was then irrigated one more time, and then closed in layers. The capsule was closed with 1 Ethibond. A subfascial hemovac drain was placed. The fascia of the tensor was closed with #1 vicryl in a running type stitch. [place] drain was placed subcutaneously. Subcutaneous tissue was closed with 0 vicryl then the subcuticular layer closed with 3-0 Vicryl in a simple buried stitch, and the skin was closed with running subcuticular 3-0 NYLON. Xeroform and TAMIKO negative pressure dressing were applied. She tolerated this well, was transferred to the bed, and taken to recovery room, extubated, in stable condition. All sponge and needle counts were correct. 30% additional time and effort was required for this case due to obese body habitus.       Signed By: Slade Dunlap MD     January 11, 2019

## 2019-01-11 NOTE — ANESTHESIA PREPROCEDURE EVALUATION
Anesthetic History No history of anesthetic complications Pertinent negatives: No PONV Review of Systems / Medical History Patient summary reviewed, nursing notes reviewed and pertinent labs reviewed Pulmonary Asthma : well controlled Pertinent negatives: No COPD and smoker Neuro/Psych CVA: no residual symptoms TIA and psychiatric history Cardiovascular Hypertension Pertinent negatives: No past MI and CAD 
 
  
GI/Hepatic/Renal 
  
GERD: well controlled Pertinent negatives: No liver disease and renal disease Endo/Other Morbid obesity and arthritis Pertinent negatives: No diabetes Other Findings Comments: etoh neg Aggrenox 1 week ago Physical Exam 
 
Airway Mallampati: III 
TM Distance: > 6 cm Neck ROM: decreased range of motion Mouth opening: Normal 
 
 Cardiovascular Dental 
 
Dentition: Upper partial plate Pulmonary Abdominal 
 
 
 
 Other Findings Anesthetic Plan ASA: 3 Anesthesia type: general 
 
 
 
 
Induction: Intravenous Anesthetic plan and risks discussed with: Patient Discussed secondary risks of no transfusion and that risks may be taken to reduce blood loss that would not have been taken if blood was an option.

## 2019-01-11 NOTE — PROGRESS NOTES
Orders received. Chart reviewed. PT assessment not performed at this time as pt reported feeling too tired to participate and requested to wait until tomorrow. Will attempt PT assessment tomorrow once pt's schedule allows.

## 2019-01-12 LAB
ANION GAP SERPL CALC-SCNC: 9 MMOL/L (ref 3–18)
BUN SERPL-MCNC: 28 MG/DL (ref 7–18)
BUN/CREAT SERPL: 24 (ref 12–20)
CALCIUM SERPL-MCNC: 8.3 MG/DL (ref 8.5–10.1)
CHLORIDE SERPL-SCNC: 108 MMOL/L (ref 100–108)
CO2 SERPL-SCNC: 22 MMOL/L (ref 21–32)
CREAT SERPL-MCNC: 1.18 MG/DL (ref 0.6–1.3)
ERYTHROCYTE [DISTWIDTH] IN BLOOD BY AUTOMATED COUNT: 14.5 % (ref 11.6–14.5)
GLUCOSE SERPL-MCNC: 133 MG/DL (ref 74–99)
HCT VFR BLD AUTO: 31.2 % (ref 35–45)
HGB BLD-MCNC: 10 G/DL (ref 12–16)
MCH RBC QN AUTO: 29.9 PG (ref 24–34)
MCHC RBC AUTO-ENTMCNC: 32.1 G/DL (ref 31–37)
MCV RBC AUTO: 93.4 FL (ref 74–97)
PLATELET # BLD AUTO: 238 K/UL (ref 135–420)
PMV BLD AUTO: 9.7 FL (ref 9.2–11.8)
POTASSIUM SERPL-SCNC: 5 MMOL/L (ref 3.5–5.5)
RBC # BLD AUTO: 3.34 M/UL (ref 4.2–5.3)
SODIUM SERPL-SCNC: 139 MMOL/L (ref 136–145)
WBC # BLD AUTO: 12.4 K/UL (ref 4.6–13.2)

## 2019-01-12 PROCEDURE — 36415 COLL VENOUS BLD VENIPUNCTURE: CPT

## 2019-01-12 PROCEDURE — 65270000029 HC RM PRIVATE

## 2019-01-12 PROCEDURE — 97110 THERAPEUTIC EXERCISES: CPT

## 2019-01-12 PROCEDURE — 74011250637 HC RX REV CODE- 250/637: Performed by: PHYSICIAN ASSISTANT

## 2019-01-12 PROCEDURE — 97161 PT EVAL LOW COMPLEX 20 MIN: CPT

## 2019-01-12 PROCEDURE — 74011250636 HC RX REV CODE- 250/636: Performed by: PHYSICIAN ASSISTANT

## 2019-01-12 PROCEDURE — 97116 GAIT TRAINING THERAPY: CPT

## 2019-01-12 PROCEDURE — 80048 BASIC METABOLIC PNL TOTAL CA: CPT

## 2019-01-12 PROCEDURE — 85027 COMPLETE CBC AUTOMATED: CPT

## 2019-01-12 PROCEDURE — 74011250637 HC RX REV CODE- 250/637: Performed by: ORTHOPAEDIC SURGERY

## 2019-01-12 RX ADMIN — KETOROLAC TROMETHAMINE 15 MG: 15 INJECTION, SOLUTION INTRAMUSCULAR; INTRAVENOUS at 21:27

## 2019-01-12 RX ADMIN — OLMESARTAN MEDOXOMIL 20 MG: 20 TABLET, FILM COATED ORAL at 08:17

## 2019-01-12 RX ADMIN — SIMVASTATIN 5 MG: 10 TABLET, FILM COATED ORAL at 21:26

## 2019-01-12 RX ADMIN — SENNOSIDES AND DOCUSATE SODIUM 1 TABLET: 8.6; 5 TABLET ORAL at 08:17

## 2019-01-12 RX ADMIN — HYDROCHLOROTHIAZIDE 12.5 MG: 12.5 CAPSULE ORAL at 08:18

## 2019-01-12 RX ADMIN — SENNOSIDES AND DOCUSATE SODIUM 1 TABLET: 8.6; 5 TABLET ORAL at 21:27

## 2019-01-12 RX ADMIN — Medication 10 ML: at 21:27

## 2019-01-12 RX ADMIN — ACETAMINOPHEN 1000 MG: 500 TABLET, FILM COATED ORAL at 21:26

## 2019-01-12 RX ADMIN — ACETAMINOPHEN 1000 MG: 500 TABLET, FILM COATED ORAL at 13:46

## 2019-01-12 RX ADMIN — CEFAZOLIN SODIUM 2 G: 2 SOLUTION INTRAVENOUS at 03:53

## 2019-01-12 RX ADMIN — OXYCODONE HYDROCHLORIDE 10 MG: 5 TABLET ORAL at 19:35

## 2019-01-12 RX ADMIN — ACETAMINOPHEN 1000 MG: 500 TABLET, FILM COATED ORAL at 06:47

## 2019-01-12 RX ADMIN — FERROUS SULFATE TAB 325 MG (65 MG ELEMENTAL FE) 325 MG: 325 (65 FE) TAB at 08:18

## 2019-01-12 RX ADMIN — ASPIRIN AND EXTENDED-RELEASE DIPYRIDAMOLE 1 CAPSULE: 25; 200 CAPSULE ORAL at 08:24

## 2019-01-12 NOTE — PROGRESS NOTES
1606 - Patient arrives to unit at this time. Admission completed at this time. Patient is A/O x 3, some drowsiness. IV to left arm intact and patent. Teds and SCDS applied to legs. Salima drain dressing dressing to left hip, with SIDDHARTHA and Hemovac drains both draining, CDI. Denies numbness/tingling. Pedal pulses palpable. Pain 8/10. Patient was oriented to the room to include use of call bell, meal ordering, and use of incentive spirometer patient able to get up to 1500 on IS. Patient was given explanation of \" up for dinner\" program and has verbalized understanding. Bed in lowest position. Phone and call bell left within reach. Patient educated on need to call for assistance before getting OOB. Plan of care for the day addressed with patient. Educated on pain medication availability and possible side effects. 1653-Pain 8/10. PRN Toradol 15mg pain medication administered at this time. Patient has been educated on side effects.

## 2019-01-12 NOTE — ROUTINE PROCESS
Bedside and Verbal shift change report given to PARRISH Majano RN (oncoming nurse) by ROSAS Rodriguez RN (offgoing nurse). Report included the following information SBAR, OR Summary, Intake/Output, MAR and Recent Results.

## 2019-01-12 NOTE — ANESTHESIA POSTPROCEDURE EVALUATION
Procedure(s): LEFT TOTAL HIP ARTHROPLASTY ANTERIOR APPROACH WITH C-ARM. Anesthesia Post Evaluation Comments: Post-Anesthesia Evaluation and Assessment Cardiovascular Function/Vital Signs /55   Pulse 60   Temp 37 °C (98.6 °F)   Resp 14   Ht 5' 3.5\" (1.613 m)   Wt 102.5 kg (226 lb)   SpO2 99%   BMI 39.41 kg/m² Patient is status post Procedure(s): LEFT TOTAL HIP ARTHROPLASTY ANTERIOR APPROACH WITH C-ARM. Nausea/Vomiting: Controlled. Postoperative hydration reviewed and adequate. Pain: 
Pain Scale 1: Numeric (0 - 10) (01/12/19 0815) Pain Intensity 1: 0 (01/12/19 0815) Managed. Neurological Status:  
Neuro (WDL): Exceptions to WDL (01/11/19 1119) At baseline. Mental Status and Level of Consciousness: Arousable. Pulmonary Status:  
O2 Device: Nasal cannula (01/11/19 1606) Adequate oxygenation and airway patent. Complications related to anesthesia: None Post-anesthesia assessment completed. No concerns. Patient has met all discharge requirements. Signed By: Alee Burgos MD  
 January 12, 2019 Visit Vitals /55 Pulse 60 Temp 37 °C (98.6 °F) Resp 14 Ht 5' 3.5\" (1.613 m) Wt 102.5 kg (226 lb) SpO2 99% BMI 39.41 kg/m²

## 2019-01-12 NOTE — PROGRESS NOTES
6376  Assumed care at this time. Assessment completed in flowsheet. Pt is alert and oriented x4. Denies SOB and chest pain. Pt lungs clear bilaterally. Capillary refill less than 3 seconds. Pt has numbness and tingling to upper extremities. Stated pain  0/10. Pt has 18GIV to the LT FA. Pt has  dressing. SCDs and TEDs applied bilaterally. Pt encouraged to continue use of IS, Pt verbalized understanding. Ice pack applied. Call light and possessions within reach. Bed is in the lowest position. Will continue to monitor.

## 2019-01-12 NOTE — PROGRESS NOTES
Problem: Falls - Risk of  Goal: *Absence of Falls  Document Tati Fall Risk and appropriate interventions in the flowsheet.   Outcome: Progressing Towards Goal  Fall Risk Interventions:  Mobility Interventions: Patient to call before getting OOB         Medication Interventions: Patient to call before getting OOB    Elimination Interventions: Call light in reach    History of Falls Interventions: Consult care management for discharge planning

## 2019-01-12 NOTE — ROUTINE PROCESS
Bedside and Verbal shift change report given to Mj Melchor RN by Josi Harmon RN. Report included the following information SBAR, Kardex, OR Summary, Intake/Output and MAR.

## 2019-01-12 NOTE — PROGRESS NOTES
Problem: Mobility Impaired (Adult and Pediatric)  Goal: *Acute Goals and Plan of Care (Insert Text)  Goals to be addressed in 1-4 days:  STG  1. Rolling L to R to L modified independent for positioning. 2. Supine to sit to supine S with HR for meals. 3. Sit to stand to sit S with RW in prep for ambulation. LTG  1. Ambulate >150ft S with RW, WBAT, for home/community mobility. 2. Ascend/descend a >1 stair steps CGA with HR for home entry. 3. Tolerate up in chair 1-2 hours for ADLs. 4. Patient/family to be independent with HEP for follow-up care and safe discharge. Outcome: Progressing Towards Goal  physical Therapy EVALUATION    Patient: Frances Evans (21 y.o. female)  Date: 1/12/2019  Primary Diagnosis: H/O total hip arthroplasty, left [Z96.642]  Procedure(s) (LRB):  LEFT TOTAL HIP ARTHROPLASTY ANTERIOR APPROACH WITH C-ARM (Left) 1 Day Post-Op   Precautions:   Fall, WBAT    ASSESSMENT :  Based on the objective data described below, the patient presents with lower extremity weakness, decreased gait quality and endurance, impaired bed mobility and transfers, decreased L hip ROM/flexibility, and overall limitations in functional mobility s/p L THR AA. Pt performed supine to sit with CGA, sit to stand with CGA. Patient ambulated 50 feet with RW, GB applied, WBAT, and CGA; decreased step clearance, antalgic step to gait pattern. Pt tolerated session well as evidenced by no lightheadedness or dizziness. Patient would benefit from skilled inpatient physical therapy to address deficits, progress as tolerated to achieve long term goals and allow safe discharge. Pt lives alone however daughter is coming to stay with her following surgery. Patient will benefit from skilled intervention to address the above impairments.   Patients rehabilitation potential is considered to be Good  Factors which may influence rehabilitation potential include:   []         None noted  []         Mental ability/status  [x] Medical condition  []         Home/family situation and support systems  []         Safety awareness  [x]         Pain tolerance/management  []         Other:      PLAN :  Recommendations and Planned Interventions:  [x]           Bed Mobility Training             [x]    Neuromuscular Re-Education  [x]           Transfer Training                   []    Orthotic/Prosthetic Training  [x]           Gait Training                          []    Modalities  [x]           Therapeutic Exercises          []    Edema Management/Control  [x]           Therapeutic Activities            [x]    Patient and Family Training/Education  []           Other (comment):    Frequency/Duration: Patient will be followed by physical therapy twice daily to address goals. Discharge Recommendations: Home Health  Further Equipment Recommendations for Discharge: N/A     SUBJECTIVE:   Patient stated I am doing pretty good today.     OBJECTIVE DATA SUMMARY:     Past Medical History:   Diagnosis Date    Arthritis     osteo    Asthma     inhaler as needed - every 2 to 3 years    Chronic pain     knee and hip ,shoulder     GERD (gastroesophageal reflux disease)     Hypertension     5yrs    Irritable bowel     Other ill-defined conditions(799.89)     h/o migraines    Other ill-defined conditions(799.89)     h/o dizzy    Other ill-defined conditions(799.89)     IBS; hiatal hernia    Psychiatric disorder     depression    Stroke ([address] 9 years ago     stroke - no deficits    Stroke ([address]     In 1997/98     Past Surgical History:   Procedure Laterality Date    CARDIAC SURG PROCEDURE UNLIST      HX CHOLECYSTECTOMY      HX HERNIA REPAIR      umbilical twice    HX HYSTERECTOMY      HX INTRAOCULAR LENS PROSTHESIS      HX KNEE ARTHROSCOPY      left    HX KNEE REPLACEMENT Left 2013    HX OTHER SURGICAL      herniated disc surgery    HX TUBAL LIGATION      LAP UMBILICAL HERNIA REPAIR      LAP,CHOLECYSTECTOMY      NEUROLOGICAL PROCEDURE UNLISTED      lower back     Barriers to Learning/Limitations: None  Compensate with: Visual Cues, Verbal Cues and Tactile Cues  Prior Level of Function/Home Situation: Independent amb c/rollator  Home Situation  Home Environment: Private residence  # Steps to Enter: 1  One/Two Story Residence: One story  Living Alone: Yes  Support Systems: Family member(s)(daughter coming to stay)  Patient Expects to be Discharged to[de-identified] Private residence  Current DME Used/Available at Home: Walker, rolling  Strength:    Strength: Generally decreased, functional  Tone & Sensation:   Tone: Normal  Sensation: Impaired  Range Of Motion:  AROM: Generally decreased, functional  PROM: Generally decreased, functional  Functional Mobility:  Bed Mobility:  Supine to Sit: Contact guard assistance; Additional time  Transfers:  Sit to Stand: Contact guard assistance  Stand to Sit: Contact guard assistance  Balance:   Sitting: Intact  Standing: Intact; With support  Ambulation/Gait Training:  Distance (ft): 50 Feet (ft)  Assistive Device: Gait belt;Walker, rolling  Ambulation - Level of Assistance: Contact guard assistance  Gait Description (WDL): Exceptions to WDL  Gait Abnormalities: Decreased step clearance; Antalgic; Step to gait  Left Side Weight Bearing: As tolerated  Base of Support: Shift to right  Stance: Left decreased  Speed/Rosamaria: Slow  Step Length: Right shortened;Left shortened  Pain:  Pain Scale 1: Numeric (0 - 10)  Pain Intensity 1: 2  Activity Tolerance:   Good  Please refer to the flowsheet for vital signs taken during this treatment.   After treatment:   []         Patient left in no apparent distress sitting up in chair  [x]         Patient left in no apparent distress in bed  [x]         Call bell left within reach  [x]         Nursing notified  []         Caregiver present  []         Bed alarm activated    COMMUNICATION/EDUCATION:   [x]         Fall prevention education was provided and the patient/caregiver indicated understanding. [x]         Patient/family have participated as able in goal setting and plan of care. [x]         Patient/family agree to work toward stated goals and plan of care. []         Patient understands intent and goals of therapy, but is neutral about his/her participation. []         Patient is unable to participate in goal setting and plan of care. Thank you for this referral.  Dominic Couch   Time Calculation: 23 mins   Eval Complexity: History: MEDIUM  Complexity : 1-2 comorbidities / personal factors will impact the outcome/ POC Exam:MEDIUM Complexity : 3 Standardized tests and measures addressing body structure, function, activity limitation and / or participation in recreation  Presentation: LOW Complexity : Stable, uncomplicated  Clinical Decision Making:Low Complexity amb >30 ft c/RW, CGA Overall Complexity:LOW  Mobility  Current  CK= 40-59%   Goal  CI= 1-19%. The severity rating is based on the Level of Assistance required for Functional Mobility and ADLs.

## 2019-01-12 NOTE — PROGRESS NOTES
Problem: Falls - Risk of  Goal: *Absence of Falls  Document Tati Fall Risk and appropriate interventions in the flowsheet. Outcome: Progressing Towards Goal  Fall Risk Interventions:  Mobility Interventions: Patient to call before getting OOB, Communicate number of staff needed for ambulation/transfer, Utilize walker, cane, or other assistive device         Medication Interventions: Patient to call before getting OOB, Teach patient to arise slowly, Utilize gait belt for transfers/ambulation    Elimination Interventions: Call light in reach, Patient to call for help with toileting needs, Toileting schedule/hourly rounds    History of Falls Interventions:  Investigate reason for fall

## 2019-01-12 NOTE — PROGRESS NOTES
Problem: Mobility Impaired (Adult and Pediatric)  Goal: *Acute Goals and Plan of Care (Insert Text)  Goals to be addressed in 1-4 days:  STG  1. Rolling L to R to L modified independent for positioning. 2. Supine to sit to supine S with HR for meals. 3. Sit to stand to sit S with RW in prep for ambulation. LTG  1. Ambulate >150ft S with RW, WBAT, for home/community mobility. 2. Ascend/descend a >1 stair steps CGA with HR for home entry. 3. Tolerate up in chair 1-2 hours for ADLs. 4. Patient/family to be independent with HEP for follow-up care and safe discharge. Outcome: Progressing Towards Goal  physical Therapy TREATMENT    Patient: America Miller (1 y.o. female)  Date: 1/12/2019  Diagnosis: H/O total hip arthroplasty, left [Z96.642] <principal problem not specified>  Procedure(s) (LRB):  LEFT TOTAL HIP ARTHROPLASTY ANTERIOR APPROACH WITH C-ARM (Left) 1 Day Post-Op  Precautions: Fall, WBAT   Chart, physical therapy assessment, plan of care and goals were reviewed. ASSESSMENT:  Pt had more pain this session however still agreeable to participate with PT. Pt amb 30 ft, gait duration limited by pain. Pt performed seated and supine there ex per exercise packet. Will attempt 1 stair step next session to prepare for safe home entry. Progression toward goals:  [x]      Improving appropriately and progressing toward goals  []      Improving slowly and progressing toward goals  []      Not making progress toward goals and plan of care will be adjusted     PLAN:  Patient continues to benefit from skilled intervention to address the above impairments. Continue treatment per established plan of care. Discharge Recommendations:  Home Health  Further Equipment Recommendations for Discharge:  N/A     SUBJECTIVE:   Patient stated I am doing ok.     OBJECTIVE DATA SUMMARY:   Critical Behavior:  Neurologic State: Alert, Appropriate for age  Orientation Level: Oriented X4  Cognition: Appropriate safety awareness, Follows commands  Functional Mobility Training:  Bed Mobility:  Supine to Sit: Contact guard assistance  Transfers:  Sit to Stand: Stand-by assistance;Contact guard assistance  Stand to Sit: Stand-by assistance  Balance:  Sitting: Intact  Standing: Intact; With support  Ambulation/Gait Training:  Distance (ft): 30 Feet (ft)  Assistive Device: Gait belt;Walker, rolling  Ambulation - Level of Assistance: Stand-by assistance  Gait Description (WDL): Exceptions to WDL  Gait Abnormalities: Decreased step clearance; Antalgic; Step to gait  Left Side Weight Bearing: As tolerated  Base of Support: Shift to right  Stance: Left decreased  Speed/Rosamaria: Slow  Step Length: Left shortened;Right shortened  Therapeutic Exercises:   Supine there ex: ankle pumps,   Pain:  Pain Scale 1: Numeric (0 - 10)  Pain Intensity 1: 6  Pain Location 1: Hip  Pain Orientation 1: Left  Pain Description 1: Aching  Pain Intervention(s) 1: Ice  Activity Tolerance:   Fair  Please refer to the flowsheet for vital signs taken during this treatment.   After treatment:   [] Patient left in no apparent distress sitting up in chair  [x] Patient left in no apparent distress in bed  [x] Call bell left within reach  [x] Nursing notified  [] Caregiver present  [] Bed alarm activated      [place] Flow Titcomb   Time Calculation: 25 mins

## 2019-01-12 NOTE — PROGRESS NOTES
0745  Bedside and Verbal shift change report given to Stewart Stock RN by ROSAS Martinez RN. Report included the following information SBAR, Kardex, OR Summary, Intake/Output and MAR.

## 2019-01-12 NOTE — PROGRESS NOTES
Problem: Falls - Risk of  Goal: *Absence of Falls  Document Tati Fall Risk and appropriate interventions in the flowsheet.   Outcome: Progressing Towards Goal  Fall Risk Interventions:  Mobility Interventions: Patient to call before getting OOB, Utilize walker, cane, or other assistive device         Medication Interventions: Patient to call before getting OOB, Teach patient to arise slowly    Elimination Interventions: Call light in reach, Elevated toilet seat, Patient to call for help with toileting needs    History of Falls Interventions: Door open when patient unattended, Investigate reason for fall

## 2019-01-12 NOTE — PROGRESS NOTES
0830  Assumed care at this time. Assessment completed in flowsheet. Pt is alert and oriented x4. Denies SOB and chest pain. Pt lungs clear bilaterally. Capillary refill less than 3 seconds. Pt denies numbness and tingling to all extremities. Stated pain  0/10. Pt has 18G IV to the LT FA. Pt has  dressing. SCDs and TEDs applied bilaterally. Pt encouraged to continue use of IS, Pt verbalized understanding. Ice pack applied. Call light and possessions within reach. Bed is in the lowest position. Will continue to monitor.     D/c SIDDHARTHA and hemovac clean and dry

## 2019-01-12 NOTE — PROGRESS NOTES
Problem: Pressure Injury - Risk of  Goal: *Prevention of pressure injury  Document Wyatt Scale and appropriate interventions in the flowsheet.   Outcome: Progressing Towards Goal  Pressure Injury Interventions:       Moisture Interventions: Absorbent underpads, Check for incontinence Q2 hours and as needed, Offer toileting Q_hr    Activity Interventions: PT/OT evaluation, Pressure redistribution bed/mattress(bed type), Increase time out of bed    Mobility Interventions: PT/OT evaluation, Pressure redistribution bed/mattress (bed type)    Nutrition Interventions: Document food/fluid/supplement intake

## 2019-01-12 NOTE — PROGRESS NOTES
Progress Note        Patient: Emily Gregory MRN: [id number]  SSN: [id number]    YOB: 1935  Age: 80 y.o. Sex: female      1 Day Post-Op status post Procedure(s) (LRB):  LEFT TOTAL HIP ARTHROPLASTY ANTERIOR APPROACH WITH C-ARM (Left)    Admit Date: 2019  Admit Diagnosis: H/O total hip arthroplasty, left [Z36.366]    Subjective:       Doing well. No complaints. No SOB. No Chest Pain. No Nausea or Vomiting. No problems eating or voiding. Objective:        Temp (24hrs), Av.8 °F (36.6 °C), Min:97.3 °F (36.3 °C), Max:98.6 °F (37 °C)    Body mass index is 39.41 kg/m². Patient Vitals for the past 12 hrs:   BP Temp Pulse Resp SpO2   19 0332 153/54 97.7 °F (36.5 °C) (!) 54 14 98 %   19 2339 149/53 98.6 °F (37 °C) 60 14 97 %     Recent Labs     19  0215   HGB 10.0*   HCT 31.2*      K 5.0      CO2 22   BUN 28*   CREA 1.18   *       Physical Exam:  Vital Signs are Stable. No Acute Distress. Alert and Oriented. Negative Homans sign. Toes AROM Full. Neurovascular exam is normal.    Dressing is Clean, Dry, and Intact. Assessment/Plan:     1. Patient doing well. Can pull Hemovac, leave SIDDHARTHA, leave TAMIKO dressing intact as well. 2. Out of BED / PT / WBAT. Anterior Hip Precautions  3. DVT ppx: Mobilization, aggrenox, DAMIEN hose, and mechanical compression  4.  D/c planning     Continue PT/OT  Discharge Plan: Home with Home Health tomorrow Pending PT clearance and oral analgesia      Signed By: Nazanin Rojas PA-C     2019

## 2019-01-12 NOTE — PROGRESS NOTES
1910 - Assumed care of patient at this time. Patient is alert and oriented x 4. Patient denies chest pain, shortness of breath, or numbness or tingling in the lower extremities. Pt does have numbness and tingling present in the left upper extremity. Negative pressure TAMIKO dressing on the left anterior hip is clean, dry and intact. Left hip Hemovac and SIDDHARTHA drain dressing sites are clean, dry and intact. Both drains are charged, patent and draining sanguinous fluid. Sequential compression device and DAMIEN hose in place on the patient bilaterally. 18g IV in the left forearm is patent and infusing LR @ 125mL/hr. Patient currently experiencing 2/10 pain. Bed is in lowest position with the wheels locked. Siderails x 3 are up. Call bell within reach. Patient is currently resting in bed in no apparent distress. Will continue to monitor. 2028 - Patient used the bedpan and voided 200 cc of clear, yellow urine. Call bell within reach. Will continue to monitor. 2112 - Head to toe assessment performed at this time. Patient has no complaints of chest pain or shortness of breath. Denies any numbness or tingling in lower extremities. Lungs are clear to auscultation. Bowel sounds are present in all 4 quadrants. 18g IV in the left forearm is patent and infusing with no signs of phlebitis or infiltration. Patient educated how to actively manage their pain. Patient encouraged to use the incentive spirometer. Patient educated on the side effects of medications. Explained the importance of ambulation. Instructed the patient not to attempt to get out of bed and/or ambulate without a staff member present. Patient verbalized understanding. Patient left in bed with call light within reach, bed in lowest position with wheels locked, and siderails x 3 up. Will continue to monitor. 2230 - Patient laying in bed with eyes closed, breathing regularly and evenly. Call bell within reach.   Will continue to monitor. 2350 - Patient laying in bed resting quietly. No needs expressed at this time. Call bell within reach. Will continue to monitor. 8434 - Patient up to bedside commode and voided 150 cc of clear, yellow urine. CHG wipes completed at this time. Patient performed side steps at the bedside before getting back into bed. Patient tolerated activity well. Call bell within reach. Will continue to monitor. 0 - Patient laying in bed resting quietly. No needs expressed at this time. Call bell within reach. Will continue to monitor. 46 - Patient laying in bed with eyes closed, breathing regularly and evenly. Call bell within reach. Will continue to monitor. [employer]man - Patient laying in bed resting quietly. No needs expressed at this time. Call bell within reach. Will continue to monitor.

## 2019-01-13 VITALS
SYSTOLIC BLOOD PRESSURE: 131 MMHG | TEMPERATURE: 98.4 F | HEART RATE: 67 BPM | WEIGHT: 249 LBS | RESPIRATION RATE: 16 BRPM | DIASTOLIC BLOOD PRESSURE: 87 MMHG | OXYGEN SATURATION: 94 % | BODY MASS INDEX: 42.51 KG/M2 | HEIGHT: 64 IN

## 2019-01-13 PROCEDURE — 97530 THERAPEUTIC ACTIVITIES: CPT

## 2019-01-13 PROCEDURE — 74011250637 HC RX REV CODE- 250/637: Performed by: PHYSICIAN ASSISTANT

## 2019-01-13 PROCEDURE — 74011250636 HC RX REV CODE- 250/636: Performed by: PHYSICIAN ASSISTANT

## 2019-01-13 PROCEDURE — 97116 GAIT TRAINING THERAPY: CPT

## 2019-01-13 PROCEDURE — 97110 THERAPEUTIC EXERCISES: CPT

## 2019-01-13 RX ORDER — OXYCODONE AND ACETAMINOPHEN 5; 325 MG/1; MG/1
TABLET ORAL
Qty: 84 TAB | Refills: 0 | Status: SHIPPED | OUTPATIENT
Start: 2019-01-13 | End: 2020-05-01

## 2019-01-13 RX ADMIN — ASPIRIN AND EXTENDED-RELEASE DIPYRIDAMOLE 1 CAPSULE: 25; 200 CAPSULE ORAL at 08:57

## 2019-01-13 RX ADMIN — OXYCODONE HYDROCHLORIDE 10 MG: 5 TABLET ORAL at 05:54

## 2019-01-13 RX ADMIN — KETOROLAC TROMETHAMINE 15 MG: 15 INJECTION, SOLUTION INTRAMUSCULAR; INTRAVENOUS at 05:54

## 2019-01-13 RX ADMIN — ACETAMINOPHEN 1000 MG: 500 TABLET, FILM COATED ORAL at 05:54

## 2019-01-13 RX ADMIN — ACETAMINOPHEN 1000 MG: 500 TABLET, FILM COATED ORAL at 13:30

## 2019-01-13 RX ADMIN — ONDANSETRON 4 MG: 2 INJECTION INTRAMUSCULAR; INTRAVENOUS at 07:33

## 2019-01-13 RX ADMIN — SENNOSIDES AND DOCUSATE SODIUM 1 TABLET: 8.6; 5 TABLET ORAL at 08:57

## 2019-01-13 RX ADMIN — FERROUS SULFATE TAB 325 MG (65 MG ELEMENTAL FE) 325 MG: 325 (65 FE) TAB at 08:57

## 2019-01-13 RX ADMIN — OXYCODONE HYDROCHLORIDE 10 MG: 5 TABLET ORAL at 13:30

## 2019-01-13 RX ADMIN — Medication 10 ML: at 05:54

## 2019-01-13 NOTE — PROGRESS NOTES
2152  Assumed care of pt at this time. Assessment complete. Pt alert and oriented x 4. Denies SOB and chest pain. Pt lungs clear bilaterally. Cap refill  less than 3 seconds. Pt denies numbness and tingling to all extremities. Stated pain 3/10. Pt has 18 G IV to L forearm. Pt has mepilex, and samanta  Dressing to left hip CDI. SCD's and TEDs in place to BLE. Pt encouraged to continue use of IS. Pt verbalized understanding. Ice pack applied. Call light and possessions within reach. Bed in low position. Will continue to monitor. 1045  PA in to see pt. PLan for pt to d/c home today. After working with PT. Pt lying in bed. States pain is 2/10. Declines any needs at this time. FABIENNE mcdaniel states leave samanta dressing in place pt will follow up in one week and will change dressing at that time     1315  Pt ambulating in hallway at this time     1330  Pt stated pain is 9/10. Medicated with 10mg of oxycodone    1333  PT informs nurse that pt has cleared PT at this time    1413  Dual AVS reviewed with Peterson Woo RN. All medications reviewed individually with patient. Opportunities for questions and concerns provided. Patient to be discharged via (mode of transport ie. Car, ambulance or air transport) car. Patient's arm band appropriately discarded. Dressing in place to be changed at follow up appt.  Visit in one week    1444  D/c paperwork signed electronically

## 2019-01-13 NOTE — ROUTINE PROCESS
Bedside and Verbal shift change report given to Jess Causey RN (oncoming nurse) by Mao Harrison RN (offgoing nurse). Report included the following information SBAR, Kardex, OR Summary, Procedure Summary, Intake/Output, MAR, Recent Results and Med Rec Status.

## 2019-01-13 NOTE — DISCHARGE SUMMARY
[address] [facility]   [address] [facility] [place] [address]     DISCHARGE SUMMARY     PATIENT: Felipe Yanes     MRN: [id number]   ADMIT DATE: 2019   BILLIN   DISCHARGE DATE:  19     ATTENDING: Sloane Burrows MD   DICTATING: Johnson Anderson PA-C     ADMISSION DIAGNOSIS: H/O total hip arthroplasty, left [P67.637]    DISCHARGE DIAGNOSIS: Status post LEFT HIP OSTEOARTHRITIS     HISTORY OF PRESENT ILLNESS: The patient is a 80y.o. year-old female   with ongoing left hip pain secondary to osteoarthritis of left hip(s). The patient's pain has persisted and progressed despite conservative treatments and therapies. The patient has at this time opted for surgical intervention.     PAST MEDICAL HISTORY:   Past Medical History:   Diagnosis Date    Arthritis     osteo    Asthma     inhaler as needed - every 2 to 3 years    Chronic pain     knee and hip ,shoulder     GERD (gastroesophageal reflux disease)     Hypertension     5yrs    Irritable bowel     Other ill-defined conditions(799.89)     h/o migraines    Other ill-defined conditions(799.89)     h/o dizzy    Other ill-defined conditions(799.89)     IBS; hiatal hernia    Psychiatric disorder     depression    Stroke ([address] 9 years ago     stroke - no deficits    Stroke ([address]     In        PAST SURGICAL HISTORY:   Past Surgical History:   Procedure Laterality Date    CARDIAC SURG PROCEDURE UNLIST      HX CHOLECYSTECTOMY      HX HERNIA REPAIR      umbilical twice    HX HYSTERECTOMY      HX INTRAOCULAR LENS PROSTHESIS      HX KNEE ARTHROSCOPY      left    HX KNEE REPLACEMENT Left 2013    HX OTHER SURGICAL      herniated disc surgery    HX TUBAL LIGATION      LAP UMBILICAL HERNIA REPAIR      LAP,CHOLECYSTECTOMY      NEUROLOGICAL PROCEDURE UNLISTED      lower back       ALLERGIES: No Known Allergies     CURRENT MEDICATIONS:  A list of medications prior to the time of admission include:  Prior to Admission medications    Medication Sig Start Date End Date Taking? Authorizing Provider   oxyCODONE-acetaminophen (PERCOCET) 5-325 mg per tablet 1-2 tabs by mouth every 4-6 hours as needed for pain 1/13/19  Yes Soo Kilgore PA-C   docusate sodium (COLACE) 50 mg capsule Take 2 Caps by mouth three (3) times daily as needed for Constipation for up to 90 days. 1/13/19 4/13/19 Yes Soo Kilgore PA-C   colchicine (COLCRYS) 0.6 mg tablet Take 0.6 mg by mouth as needed. Yes Provider, Historical   simvastatin (ZOCOR) 5 mg tablet Take 5 mg by mouth nightly. Yes Provider, Historical   olmesartan-hydroCHLOROthiazide (BENICAR HCT) 20-12.5 mg per tablet Take 1 Tab by mouth daily. Yes Provider, Historical   traMADol (ULTRAM) 50 mg tablet Take 50 mg by mouth as needed for Pain. Yes Provider, Historical   HYDROcodone-acetaminophen (NORCO) 5-325 mg per tablet Take 1 Tab by mouth as needed for Pain. Yes Provider, Historical   folic acid/multivit-min/lutein (CENTRUM SILVER PO) Take  by mouth daily. Yes Provider, Historical   acetaminophen (TYLENOL EXTRA STRENGTH) 500 mg tablet Take 2 Tabs by mouth every six (6) hours as needed for Pain. 12/19/17  Yes Gibran Maciel MD   Dexlansoprazole (DEXILANT) 60 mg CpDB Take 60 mg by mouth as needed. Yes Other, MD Maurizio   potassium chloride (KLOR-CON) 20 mEq packet Take 20 mEq by mouth daily. Yes Provider, Historical   dipyridamole-aspirin (AGGRENOX) 200-25 mg per SR capsule Take 1 Cap by mouth daily. Yes Provider, Historical   calcium carbonate (TUMS) 200 mg calcium (500 mg) Chew Take 1 Tab by mouth as needed. Yes Provider, Historical   meclizine (ANTIVERT) 12.5 mg tablet Take 12.5 mg by mouth three (3) times daily as needed. Provider, Historical   promethazine (PHENERGAN) 25 mg tablet Take 25 mg by mouth every six (6) hours as needed for Nausea.     Provider, Historical       FAMILY HISTORY:   Family History   Problem Relation Age of Onset    Cancer Father     Malignant Hyperthermia Neg Hx     Pseudocholinesterase Deficiency Neg Hx     Post-op Nausea/Vomiting Neg Hx     Delayed Awakening Neg Hx     Emergence Delirium Neg Hx     Post-op Cognitive Dysfunction Neg Hx     Other Neg Hx        SOCIAL HISTORY:   Social History     Socioeconomic History    Marital status: SINGLE     Spouse name: Not on file    Number of children: Not on file    Years of education: Not on file    Highest education level: Not on file   Tobacco Use    Smoking status: Never Smoker    Smokeless tobacco: Never Used   Substance and Sexual Activity    Alcohol use: No    Drug use: No       REVIEW OF SYSTEMS: All review of systems are negative. PHYSICAL EXAMINATION: For a detailed physical exam, please refer to the patient's chart. HOSPITAL COURSE: The patient was taken to surgery the day of admission. she underwent left total hip replacement(s) via the anterior approach. Operative course was benign. Estimated blood loss approximately 300 mL. The patient was taken to the PACU in stable condition and was later taken to the floor in stable condition. Post-op Day #2, patient has done very well.  she has had little to no pain. she had been cleared by physical therapy with stair training. she was placed on her home aggrenox for DVT prophylaxis. her vitals have remained stable. she has also remained hemodynamically stable. The patient has been recommended for discharge home with Home Health. DISCHARGE INSTRUCTIONS: The patient is to be discharged home with Home Health. she is to continue on her prior medications per the medication reconciliation form, to which we will add:  1. Colace 100 mg by mouth 3 times daily as needed for constipation  2.  Percocet 5/325 mg; 1-2 tablets p.o. every 4 to 6 hours as needed for pain    The patient is to continue at home with home physical therapy 3 times a week to work on gait training, range of motion, strengthening, and weightbearing exercises as tolerated on her left lower extremity(ies). The patient is to progress from a walker to a cane to complete total weightbearing as tolerable. The patient is to continue to keep her incision dry. The patient is to followup with Dr. Aashish Higgins in the office in 1-2 weeks status post for x-rays and further evaluation.       Leonardo Colin PA-C  7/05/874146:73 AM

## 2019-01-13 NOTE — PROGRESS NOTES
1945  Bedside and Verbal shift change report given to Chantal Grimm RN. Report included the following information SBAR, Kardex, OR Summary, Intake/Output and MAR.

## 2019-01-13 NOTE — PROGRESS NOTES
1940 - Vital signs obtained by CNA: Blood pressure 158/69, pulse 79, temperature 98.1 °F (36.7 °C), resp. rate 16, height 5' 3.5\" (1.613 m), weight 102.5 kg (226 lb), SpO2 99 %. 2100 - Shift assessment completed at this time. Zocor not available for administration. Pharmacy contacted. 2244 - Vital signs obtained: Blood pressure 121/47, pulse 61, temperature 98 °F (36.7 °C), resp. rate 17, height 5' 3.5\" (1.613 m), weight 112.9 kg (249 lb), SpO2 100 %. 0504 - Patient resting quietly in bed.     0100 - Patient resting quietly in bed.     0250 - Patient assisted to bathroom with walker, voiding without difficulty. 0345 - Vital signs obtained by CNA: Blood pressure 146/59, pulse 63, temperature 98.4 °F (36.9 °C), resp. rate 16, height 5' 3.5\" (1.613 m), weight 112.9 kg (249 lb), SpO2 98 %. 5926 - Patient medicated per MAR.

## 2019-01-13 NOTE — PROGRESS NOTES
Problem: Mobility Impaired (Adult and Pediatric)  Goal: *Acute Goals and Plan of Care (Insert Text)  Goals to be addressed in 1-4 days:  STG  1. Rolling L to R to L modified independent for positioning. 2. Supine to sit to supine S with HR for meals. 3. Sit to stand to sit S with RW in prep for ambulation. LTG  1. Ambulate >150ft S with RW, WBAT, for home/community mobility. 2. Ascend/descend a >1 stair steps CGA with HR for home entry. 3. Tolerate up in chair 1-2 hours for ADLs. 4. Patient/family to be independent with HEP for follow-up care and safe discharge. Outcome: Resolved/Met Date Met: 01/13/19  physical Therapy TREATMENT/DISCHARGE    Patient: Rosio Munoz (89 y.o. female)  Date: 1/13/2019  Diagnosis: H/O total hip arthroplasty, left [Z96.642] <principal problem not specified>  Procedure(s) (LRB):  LEFT TOTAL HIP ARTHROPLASTY ANTERIOR APPROACH WITH C-ARM (Left) 2 Days Post-Op  Precautions: Fall, WBAT  Chart, physical therapy assessment, plan of care and goals were reviewed. ASSESSMENT:  Pt meets needs for safe home mobility, expresses understanding of HEP and is cleared from this level of PT. Progression toward goals:  [x]      Goals met  []      Improving appropriately and progressing toward goals  []      Improving slowly and progressing toward goals  []      Not making progress toward goals and plan of care will be adjusted     PLAN:  Patient will be discharged from physical therapy at this time.   Rationale for discharge:  [x] Goals Achieved  [] [address]  [] Patient not participating in therapy  [] Other:  Discharge Recommendations:  Home Health  Further Equipment Recommendations for Discharge:  rolling walker     SUBJECTIVE:   Patient stated  I think I need help with my leg     OBJECTIVE DATA SUMMARY:   Critical Behavior:  Neurologic State: Appropriate for age, Alert  Orientation Level: Oriented X4  Cognition: Appropriate decision making  Functional Mobility Training:  Bed Mobility:  Supine to Sit: Supervision; Additional time  Sit to Supine: Supervision; Additional time; Adaptive equipment(with leg  )  Transfers:  Sit to Stand: Supervision; Additional time  Stand to Sit: Supervision; Additional time  Balance:  Sitting: Intact  Standing: Intact; With support  Ambulation/Gait Training:  Distance (ft): 150 Feet (ft)  Assistive Device: Walker, rolling;Gait belt  Ambulation - Level of Assistance: Supervision  Gait Abnormalities: Decreased step clearance  Left Side Weight Bearing: As tolerated  Base of Support: Shift to right  Stance: Left decreased  Speed/Rosamaria: Slow  Step Length: Right shortened;Left shortened    Stairs:  Number of Stairs Trained:  1(box step)  Stairs - Level of Assistance: Contact guard assistance   Rail Use: (RW)    Therapeutic Exercises:   Reviewed HEP per protocol     Pain:  Pain Scale 1: Numeric (0 - 10)  Pain Intensity 1: 9  Pain Location 1: Hip  Pain Orientation 1: Left  Pain Intervention(s) 1: Medication (see MAR)  Activity Tolerance:   Fair     After treatment:   [] Patient left in no apparent distress sitting up in chair  [x] Patient left in no apparent distress in bed  [x] Call bell left within reach  [] Nursing notified  [] Caregiver present  [] Bed alarm activated  Ramesh Palacios PTA   Time Calculation: 42 mins

## 2019-07-16 ENCOUNTER — HOSPITAL ENCOUNTER (EMERGENCY)
Age: 84
Discharge: HOME | End: 2019-07-16
Attending: EMERGENCY MEDICINE
Payer: MEDICARE

## 2019-07-16 VITALS
DIASTOLIC BLOOD PRESSURE: 61 MMHG | WEIGHT: 230 LBS | HEART RATE: 69 BPM | RESPIRATION RATE: 18 BRPM | BODY MASS INDEX: 40.75 KG/M2 | HEIGHT: 63 IN | SYSTOLIC BLOOD PRESSURE: 147 MMHG | OXYGEN SATURATION: 100 % | TEMPERATURE: 97.9 F

## 2019-07-16 DIAGNOSIS — M54.32 SCIATICA OF LEFT SIDE: ICD-10-CM

## 2019-07-16 DIAGNOSIS — N39.0 URINARY TRACT INFECTION WITHOUT HEMATURIA, SITE UNSPECIFIED: Primary | ICD-10-CM

## 2019-07-16 LAB
ALBUMIN SERPL-MCNC: 3.6 G/DL (ref 3.4–5)
ALBUMIN/GLOB SERPL: 0.9 {RATIO} (ref 0.8–1.7)
ALP SERPL-CCNC: 97 U/L (ref 45–117)
ALT SERPL-CCNC: 21 U/L (ref 13–56)
ANION GAP SERPL CALC-SCNC: 7 MMOL/L (ref 3–18)
APPEARANCE UR: CLEAR
AST SERPL-CCNC: 21 U/L (ref 15–37)
BACTERIA URNS QL MICRO: ABNORMAL /HPF
BASOPHILS # BLD: 0 K/UL (ref 0–0.1)
BASOPHILS NFR BLD: 0 % (ref 0–2)
BILIRUB SERPL-MCNC: 0.4 MG/DL (ref 0.2–1)
BILIRUB UR QL: NEGATIVE
BUN SERPL-MCNC: 16 MG/DL (ref 7–18)
BUN/CREAT SERPL: 15 (ref 12–20)
CALCIUM SERPL-MCNC: 9.4 MG/DL (ref 8.5–10.1)
CHLORIDE SERPL-SCNC: 109 MMOL/L (ref 100–108)
CO2 SERPL-SCNC: 25 MMOL/L (ref 21–32)
COLOR UR: YELLOW
CREAT SERPL-MCNC: 1.06 MG/DL (ref 0.6–1.3)
DIFFERENTIAL METHOD BLD: ABNORMAL
EOSINOPHIL # BLD: 0.3 K/UL (ref 0–0.4)
EOSINOPHIL NFR BLD: 3 % (ref 0–5)
EPITH CASTS URNS QL MICRO: ABNORMAL /LPF (ref 0–5)
ERYTHROCYTE [DISTWIDTH] IN BLOOD BY AUTOMATED COUNT: 14.7 % (ref 11.6–14.5)
GLOBULIN SER CALC-MCNC: 4 G/DL (ref 2–4)
GLUCOSE SERPL-MCNC: 119 MG/DL (ref 74–99)
GLUCOSE UR STRIP.AUTO-MCNC: NEGATIVE MG/DL
HCT VFR BLD AUTO: 36.4 % (ref 35–45)
HGB BLD-MCNC: 12.1 G/DL (ref 12–16)
HGB UR QL STRIP: NEGATIVE
KETONES UR QL STRIP.AUTO: NEGATIVE MG/DL
LEUKOCYTE ESTERASE UR QL STRIP.AUTO: ABNORMAL
LYMPHOCYTES # BLD: 2.2 K/UL (ref 0.9–3.6)
LYMPHOCYTES NFR BLD: 20 % (ref 21–52)
MCH RBC QN AUTO: 29.4 PG (ref 24–34)
MCHC RBC AUTO-ENTMCNC: 33.2 G/DL (ref 31–37)
MCV RBC AUTO: 88.6 FL (ref 74–97)
MONOCYTES # BLD: 0.6 K/UL (ref 0.05–1.2)
MONOCYTES NFR BLD: 6 % (ref 3–10)
NEUTS SEG # BLD: 8 K/UL (ref 1.8–8)
NEUTS SEG NFR BLD: 71 % (ref 40–73)
NITRITE UR QL STRIP.AUTO: NEGATIVE
PH UR STRIP: 5 [PH] (ref 5–8)
PLATELET # BLD AUTO: 321 K/UL (ref 135–420)
PMV BLD AUTO: 9.5 FL (ref 9.2–11.8)
POTASSIUM SERPL-SCNC: 3.9 MMOL/L (ref 3.5–5.5)
PROT SERPL-MCNC: 7.6 G/DL (ref 6.4–8.2)
PROT UR STRIP-MCNC: NEGATIVE MG/DL
RBC # BLD AUTO: 4.11 M/UL (ref 4.2–5.3)
RBC #/AREA URNS HPF: ABNORMAL /HPF (ref 0–5)
SODIUM SERPL-SCNC: 141 MMOL/L (ref 136–145)
SP GR UR REFRACTOMETRY: 1.01 (ref 1–1.03)
UROBILINOGEN UR QL STRIP.AUTO: 0.2 EU/DL (ref 0.2–1)
WBC # BLD AUTO: 11.2 K/UL (ref 4.6–13.2)
WBC URNS QL MICRO: ABNORMAL /HPF (ref 0–5)

## 2019-07-16 PROCEDURE — 96375 TX/PRO/DX INJ NEW DRUG ADDON: CPT

## 2019-07-16 PROCEDURE — 85025 COMPLETE CBC W/AUTO DIFF WBC: CPT

## 2019-07-16 PROCEDURE — 87086 URINE CULTURE/COLONY COUNT: CPT

## 2019-07-16 PROCEDURE — 80053 COMPREHEN METABOLIC PANEL: CPT

## 2019-07-16 PROCEDURE — 96374 THER/PROPH/DIAG INJ IV PUSH: CPT

## 2019-07-16 PROCEDURE — 74011250636 HC RX REV CODE- 250/636: Performed by: PHYSICIAN ASSISTANT

## 2019-07-16 PROCEDURE — 99283 EMERGENCY DEPT VISIT LOW MDM: CPT

## 2019-07-16 PROCEDURE — 81001 URINALYSIS AUTO W/SCOPE: CPT

## 2019-07-16 RX ORDER — PREDNISONE 50 MG/1
50 TABLET ORAL DAILY
Qty: 3 TAB | Refills: 0 | Status: SHIPPED | OUTPATIENT
Start: 2019-07-16 | End: 2019-07-19

## 2019-07-16 RX ORDER — SODIUM CHLORIDE 0.9 % (FLUSH) 0.9 %
5-40 SYRINGE (ML) INJECTION AS NEEDED
Status: DISCONTINUED | OUTPATIENT
Start: 2019-07-16 | End: 2019-07-16 | Stop reason: HOSPADM

## 2019-07-16 RX ORDER — SODIUM CHLORIDE 0.9 % (FLUSH) 0.9 %
5-40 SYRINGE (ML) INJECTION EVERY 8 HOURS
Status: DISCONTINUED | OUTPATIENT
Start: 2019-07-16 | End: 2019-07-16 | Stop reason: HOSPADM

## 2019-07-16 RX ORDER — METHOCARBAMOL 750 MG/1
750 TABLET, FILM COATED ORAL 3 TIMES DAILY
Qty: 20 TAB | Refills: 0 | Status: SHIPPED | OUTPATIENT
Start: 2019-07-16 | End: 2020-05-01

## 2019-07-16 RX ORDER — MORPHINE SULFATE 2 MG/ML
4 INJECTION, SOLUTION INTRAMUSCULAR; INTRAVENOUS ONCE
Status: COMPLETED | OUTPATIENT
Start: 2019-07-16 | End: 2019-07-16

## 2019-07-16 RX ORDER — ONDANSETRON 2 MG/ML
4 INJECTION INTRAMUSCULAR; INTRAVENOUS ONCE
Status: COMPLETED | OUTPATIENT
Start: 2019-07-16 | End: 2019-07-16

## 2019-07-16 RX ORDER — NITROFURANTOIN 25; 75 MG/1; MG/1
100 CAPSULE ORAL 2 TIMES DAILY
Qty: 6 CAP | Refills: 0 | Status: SHIPPED | OUTPATIENT
Start: 2019-07-16 | End: 2019-07-19

## 2019-07-16 RX ADMIN — ONDANSETRON 4 MG: 2 INJECTION INTRAMUSCULAR; INTRAVENOUS at 18:24

## 2019-07-16 RX ADMIN — MORPHINE SULFATE 4 MG: 2 INJECTION, SOLUTION INTRAMUSCULAR; INTRAVENOUS at 18:24

## 2019-07-16 NOTE — DISCHARGE INSTRUCTIONS
Patient Education        Back Pain: Care Instructions  Your Care Instructions    Back pain has many possible causes. It is often related to problems with muscles and ligaments of the back. It may also be related to problems with the nerves, discs, or bones of the back. Moving, lifting, standing, sitting, or sleeping in an awkward way can strain the back. Sometimes you don't notice the injury until later. Arthritis is another common cause of back pain. Although it may hurt a lot, back pain usually improves on its own within several weeks. Most people recover in 12 weeks or less. Using good home treatment and being careful not to stress your back can help you feel better sooner. Follow-up care is a key part of your treatment and safety. Be sure to make and go to all appointments, and call your doctor if you are having problems. It's also a good idea to know your test results and keep a list of the medicines you take. How can you care for yourself at home? · Sit or lie in positions that are most comfortable and reduce your pain. Try one of these positions when you lie down:  ? Lie on your back with your knees bent and supported by large pillows. ? Lie on the floor with your legs on the seat of a sofa or chair. ? Lie on your side with your knees and hips bent and a pillow between your legs. ? Lie on your stomach if it does not make pain worse. · Do not sit up in bed, and avoid soft couches and twisted positions. Bed rest can help relieve pain at first, but it delays healing. Avoid bed rest after the first day of back pain. · Change positions every 30 minutes. If you must sit for long periods of time, take breaks from sitting. Get up and walk around, or lie in a comfortable position. · Try using a heating pad on a low or medium setting for 15 to 20 minutes every 2 or 3 hours. Try a warm shower in place of one session with the heating pad. · You can also try an ice pack for 10 to 15 minutes every 2 to 3 hours. Put a thin cloth between the ice pack and your skin. · Take pain medicines exactly as directed. ? If the doctor gave you a prescription medicine for pain, take it as prescribed. ? If you are not taking a prescription pain medicine, ask your doctor if you can take an over-the-counter medicine. · Take short walks several times a day. You can start with 5 to 10 minutes, 3 or 4 times a day, and work up to longer walks. Walk on level surfaces and avoid hills and stairs until your back is better. · Return to work and other activities as soon as you can. Continued rest without activity is usually not good for your back. · To prevent future back pain, do exercises to stretch and strengthen your back and stomach. Learn how to use good posture, safe lifting techniques, and proper body mechanics. When should you call for help? Call your doctor now or seek immediate medical care if:    · You have new or worsening numbness in your legs.     · You have new or worsening weakness in your legs. (This could make it hard to stand up.)     · You lose control of your bladder or bowels.    Watch closely for changes in your health, and be sure to contact your doctor if:    · You have a fever, lose weight, or don't feel well.     · You do not get better as expected. Where can you learn more? Go to http://shell-david.info/. Enter N619 in the search box to learn more about \"Back Pain: Care Instructions. \"  Current as of: September 20, 2018  Content Version: 11.9  © 0949-8179 [employer]. Care instructions adapted under license by [employer] (which disclaims liability or warranty for this information). If you have questions about a medical condition or this instruction, always ask your healthcare professional. [place] [address] any warranty or liability for your use of this information.          Patient Education        Urinary Tract Infection in Women: Care Instructions  Your Care Instructions    A urinary tract infection, or UTI, is a general term for an infection anywhere between the kidneys and the urethra (where urine comes out). Most UTIs are bladder infections. They often cause pain or burning when you urinate. UTIs are caused by bacteria and can be cured with antibiotics. Be sure to complete your treatment so that the infection goes away. Follow-up care is a key part of your treatment and safety. Be sure to make and go to all appointments, and call your doctor if you are having problems. It's also a good idea to know your test results and keep a list of the medicines you take. How can you care for yourself at home? · Take your antibiotics as directed. Do not stop taking them just because you feel better. You need to take the full course of antibiotics. · Drink extra water and other fluids for the next day or two. This may help wash out the bacteria that are causing the infection. (If you have kidney, heart, or liver disease and have to limit fluids, talk with your doctor before you increase your fluid intake.)  · Avoid drinks that are carbonated or have caffeine. They can irritate the bladder. · Urinate often. Try to empty your bladder each time. · To relieve pain, take a hot bath or lay a heating pad set on low over your lower belly or genital area. Never go to sleep with a heating pad in place. To prevent UTIs  · Drink plenty of water each day. This helps you urinate often, which clears bacteria from your system. (If you have kidney, heart, or liver disease and have to limit fluids, talk with your doctor before you increase your fluid intake.)  · Urinate when you need to. · Urinate right after you have sex. · Change sanitary pads often. · Avoid douches, bubble baths, feminine hygiene sprays, and other feminine hygiene products that have deodorants. · After going to the bathroom, wipe from front to back. When should you call for help?   Call your doctor now or seek immediate medical care if:    · Symptoms such as fever, chills, nausea, or vomiting get worse or appear for the first time.     · You have new pain in your back just below your rib cage. This is called flank pain.     · There is new blood or pus in your urine.     · You have any problems with your antibiotic medicine.    Watch closely for changes in your health, and be sure to contact your doctor if:    · You are not getting better after taking an antibiotic for 2 days.     · Your symptoms go away but then come back. Where can you learn more? Go to http://shell-david.info/. Enter A104 in the search box to learn more about \"Urinary Tract Infection in Women: Care Instructions. \"  Current as of: March 20, 2018  Content Version: 11.9  © 1815-1128 [employer]. Care instructions adapted under license by [employer] (which disclaims liability or warranty for this information). If you have questions about a medical condition or this instruction, always ask your healthcare professional. [address] any warranty or liability for your use of this information.

## 2019-07-16 NOTE — ED PROVIDER NOTES
EMERGENCY DEPARTMENT HISTORY AND PHYSICAL EXAM    Date: 7/16/2019  Patient Name: Zoie Calderon    History of Presenting Illness     Chief Complaint   Patient presents with    Flank Pain         History Provided By: Patient    Chief Complaint: Left low back pain    Additional History (Context):   5:45 PM  Zoei Calderon is a 80 y.o. female with PMHX of osteoarthritis, asthma, chronic pain, GERD, hypertension, IBS, migraines, depression, CVA who presents to the emergency department C/O pain in the left low back for 4 days. Associated sxs include pain radiating down the left lower extremity to the foot. Pt denies injury, dysuria, hematuria, vaginal discharge, abdominal pain, and any other sxs or complaints. Patient reports that she sees Dr. Dee Wilkins for her orthopedic needs and she had a hip replacement in January. She states Dr. Dee Wilkins is done an x-ray of her low back and says that she has disc issues and arthritis. Since Thursday her pain has become acutely worse. PCP: Will Stovall DO    Current Facility-Administered Medications   Medication Dose Route Frequency Provider Last Rate Last Dose    sodium chloride (NS) flush 5-40 mL  5-40 mL IntraVENous Q8H FABIENNE Lynne        sodium chloride (NS) flush 5-40 mL  5-40 mL IntraVENous PRN FABIENNE Lynne         Current Outpatient Medications   Medication Sig Dispense Refill    nitrofurantoin, macrocrystal-monohydrate, (MACROBID) 100 mg capsule Take 1 Cap by mouth two (2) times a day for 3 days. 6 Cap 0    predniSONE (DELTASONE) 50 mg tablet Take 1 Tab by mouth daily for 3 days. 3 Tab 0    methocarbamol (ROBAXIN-750) 750 mg tablet Take 1 Tab by mouth three (3) times daily. 20 Tab 0    oxyCODONE-acetaminophen (PERCOCET) 5-325 mg per tablet 1-2 tabs by mouth every 4-6 hours as needed for pain 84 Tab 0    colchicine (COLCRYS) 0.6 mg tablet Take 0.6 mg by mouth as needed.       meclizine (ANTIVERT) 12.5 mg tablet Take 12.5 mg by mouth three (3) times daily as needed.  promethazine (PHENERGAN) 25 mg tablet Take 25 mg by mouth every six (6) hours as needed for Nausea.  simvastatin (ZOCOR) 5 mg tablet Take 5 mg by mouth nightly.  olmesartan-hydroCHLOROthiazide (BENICAR HCT) 20-12.5 mg per tablet Take 1 Tab by mouth daily.  folic acid/multivit-min/lutein (CENTRUM SILVER PO) Take  by mouth daily.  Dexlansoprazole (DEXILANT) 60 mg CpDB Take 60 mg by mouth as needed.  potassium chloride (KLOR-CON) 20 mEq packet Take 20 mEq by mouth daily.  dipyridamole-aspirin (AGGRENOX) 200-25 mg per SR capsule Take 1 Cap by mouth daily.  calcium carbonate (TUMS) 200 mg calcium (500 mg) Chew Take 1 Tab by mouth as needed.            Past History     Past Medical History:  Past Medical History:   Diagnosis Date    Arthritis     osteo    Asthma     inhaler as needed - every 2 to 3 years    Chronic pain     knee and hip ,shoulder     GERD (gastroesophageal reflux disease)     Hypertension     5yrs    Irritable bowel     Other ill-defined conditions(799.89)     h/o migraines    Other ill-defined conditions(799.89)     h/o dizzy    Other ill-defined conditions(799.89)     IBS; hiatal hernia    Psychiatric disorder     depression    Stroke ([address] 9 years ago     stroke - no deficits    Stroke ([address]     In 1997/98       Past Surgical History:  Past Surgical History:   Procedure Laterality Date    CARDIAC SURG PROCEDURE UNLIST      HX CHOLECYSTECTOMY      HX HERNIA REPAIR      umbilical twice    HX HYSTERECTOMY      HX INTRAOCULAR LENS PROSTHESIS      HX KNEE ARTHROSCOPY      left    HX KNEE REPLACEMENT Left 2013    HX OTHER SURGICAL      herniated disc surgery    HX TUBAL LIGATION      LAP UMBILICAL HERNIA REPAIR      LAP,CHOLECYSTECTOMY      NEUROLOGICAL PROCEDURE UNLISTED      lower back       Family History:  Family History   Problem Relation Age of Onset    Cancer Father     Malignant Hyperthermia Neg Hx     Pseudocholinesterase Deficiency Neg Hx     Post-op Nausea/Vomiting Neg Hx     Delayed Awakening Neg Hx     Emergence Delirium Neg Hx     Post-op Cognitive Dysfunction Neg Hx     Other Neg Hx        Social History:  Social History     Tobacco Use    Smoking status: Never Smoker    Smokeless tobacco: Never Used   Substance Use Topics    Alcohol use: No    Drug use: No       Allergies:  No Known Allergies      Review of Systems   Review of Systems   Constitutional: Negative for activity change and unexpected weight change. HENT: Negative for congestion and ear pain. Respiratory: Negative for cough and shortness of breath. Cardiovascular: Negative for chest pain. Gastrointestinal: Negative for abdominal pain. Genitourinary: Negative for dysuria. Musculoskeletal: Positive for back pain. Negative for neck pain. Skin: Negative for rash. Neurological: Negative for syncope and headaches. All other systems reviewed and are negative. Physical Exam     Vitals:    07/16/19 1556 07/1935   BP: 147/61    Pulse: 69    Resp: 18    Temp: 97.9 °F (36.6 °C)    SpO2: 100% 100%   Weight: 104.3 kg (230 lb)    Height: 5' 3\" (1.6 m)      Physical Exam   Constitutional: She is oriented to person, place, and time. She appears well-developed and well-nourished. No distress. HENT:   Head: Normocephalic and atraumatic. Right Ear: Tympanic membrane, external ear and ear canal normal.   Left Ear: Tympanic membrane, external ear and ear canal normal.   Nose: Nose normal.   Mouth/Throat: Uvula is midline, oropharynx is clear and moist and mucous membranes are normal. No oropharyngeal exudate, posterior oropharyngeal edema or posterior oropharyngeal erythema. Eyes: Pupils are equal, round, and reactive to light. EOM are normal. Right eye exhibits no discharge. Left eye exhibits no discharge. Neck: Trachea normal, normal range of motion and full passive range of motion without pain. Neck supple. No neck rigidity. Cardiovascular: Normal rate, regular rhythm, normal heart sounds and normal pulses. Exam reveals no gallop and no friction rub. No murmur heard. Pulmonary/Chest: Effort normal and breath sounds normal. No respiratory distress. She has no wheezes. She has no rales. She exhibits no tenderness. Abdominal: Soft. Bowel sounds are normal. She exhibits no distension and no mass. There is no tenderness. There is no rebound and no guarding. Musculoskeletal:        Lumbar back: She exhibits decreased range of motion, tenderness and pain. She exhibits no bony tenderness, no swelling, no deformity and no spasm. Back:    Tender in the left sciatic notch. Pain with straight leg raise on the left side   Lymphadenopathy:     She has no cervical adenopathy. Neurological: She is alert and oriented to person, place, and time. Skin: Skin is warm and dry. No rash noted. She is not diaphoretic. Psychiatric: She has a normal mood and affect. Judgment normal.   Nursing note and vitals reviewed.         Diagnostic Study Results     Labs -     Recent Results (from the past 12 hour(s))   URINALYSIS W/ RFLX MICROSCOPIC    Collection Time: 07/16/19  5:45 PM   Result Value Ref Range    Color YELLOW      Appearance CLEAR      Specific gravity 1.014 1.005 - 1.030      pH (UA) 5.0 5.0 - 8.0      Protein NEGATIVE  NEG mg/dL    Glucose NEGATIVE  NEG mg/dL    Ketone NEGATIVE  NEG mg/dL    Bilirubin NEGATIVE  NEG      Blood NEGATIVE  NEG      Urobilinogen 0.2 0.2 - 1.0 EU/dL    Nitrites NEGATIVE  NEG      Leukocyte Esterase TRACE (A) NEG     CBC WITH AUTOMATED DIFF    Collection Time: 07/16/19  6:15 PM   Result Value Ref Range    WBC 11.2 4.6 - 13.2 K/uL    RBC 4.11 (L) 4.20 - 5.30 M/uL    HGB 12.1 12.0 - 16.0 g/dL    HCT 36.4 35.0 - 45.0 %    MCV 88.6 74.0 - 97.0 FL    MCH 29.4 24.0 - 34.0 PG    MCHC 33.2 31.0 - 37.0 g/dL    RDW 14.7 (H) 11.6 - 14.5 %    PLATELET 108 764 - 782 K/uL    MPV 9.5 9.2 - 11.8 FL NEUTROPHILS 71 40 - 73 %    LYMPHOCYTES 20 (L) 21 - 52 %    MONOCYTES 6 3 - 10 %    EOSINOPHILS 3 0 - 5 %    BASOPHILS 0 0 - 2 %    ABS. NEUTROPHILS 8.0 1.8 - 8.0 K/UL    ABS. LYMPHOCYTES 2.2 0.9 - 3.6 K/UL    ABS. MONOCYTES 0.6 0.05 - 1.2 K/UL    ABS. EOSINOPHILS 0.3 0.0 - 0.4 K/UL    ABS. BASOPHILS 0.0 0.0 - 0.1 K/UL    DF AUTOMATED     METABOLIC PANEL, COMPREHENSIVE    Collection Time: 07/16/19  6:15 PM   Result Value Ref Range    Sodium 141 136 - 145 mmol/L    Potassium 3.9 3.5 - 5.5 mmol/L    Chloride 109 (H) 100 - 108 mmol/L    CO2 25 21 - 32 mmol/L    Anion gap 7 3.0 - 18 mmol/L    Glucose 119 (H) 74 - 99 mg/dL    BUN 16 7.0 - 18 MG/DL    Creatinine 1.06 0.6 - 1.3 MG/DL    BUN/Creatinine ratio 15 12 - 20      GFR est AA 60 (L) >60 ml/min/1.73m2    GFR est non-AA 49 (L) >60 ml/min/1.73m2    Calcium 9.4 8.5 - 10.1 MG/DL    Bilirubin, total 0.4 0.2 - 1.0 MG/DL    ALT (SGPT) 21 13 - 56 U/L    AST (SGOT) 21 15 - 37 U/L    Alk. phosphatase 97 45 - 117 U/L    Protein, total 7.6 6.4 - 8.2 g/dL    Albumin 3.6 3.4 - 5.0 g/dL    Globulin 4.0 2.0 - 4.0 g/dL    A-G Ratio 0.9 0.8 - 1.7         Radiologic Studies -   No orders to display     CT Results  (Last 48 hours)    None        CXR Results  (Last 48 hours)    None          Medications given in the ED-  Medications   sodium chloride (NS) flush 5-40 mL (has no administration in time range)   sodium chloride (NS) flush 5-40 mL (has no administration in time range)   morphine injection 4 mg (4 mg IntraVENous Given 7/16/19 1824)   ondansetron (ZOFRAN) injection 4 mg (4 mg IntraVENous Given 7/16/19 1824)         Medical Decision Making   I am the first provider for this patient. I reviewed the vital signs, available nursing notes, past medical history, past surgical history, family history and social history. Vital Signs-Reviewed the patient's vital signs.     Pulse Oximetry Analysis - 100% on RA     Records Reviewed: Nursing Notes and Old Medical Records    Provider Notes (Medical Decision Making): Probable musculoskeletal back pain/sciatica. Will check labs to rule out metabolic derangement/pyelonephritis/kidney stone or UTI and will treat patient's pain. She reports the oxycodone she had left from her hip surgery did nothing for her pain    Procedures:  Procedures    ED Course:   5:45 PM Initial assessment performed. The patients presenting problems have been discussed, and they are in agreement with the care plan formulated and outlined with them. I have encouraged them to ask questions as they arise throughout their visit. Diagnosis and Disposition       DISCHARGE NOTE:  7:57 PM   Kandis Reina's  results have been reviewed with her. She has been counseled regarding her diagnosis, treatment, and plan. She verbally conveys understanding and agreement of the signs, symptoms, diagnosis, treatment and prognosis and additionally agrees to follow up as discussed. She also agrees with the care-plan and conveys that all of her questions have been answered. I have also provided discharge instructions for her that include: educational information regarding their diagnosis and treatment, and list of reasons why they would want to return to the ED prior to their follow-up appointment, should her condition change. She has been provided with education for proper emergency department utilization. CLINICAL IMPRESSION:    1. Urinary tract infection without hematuria, site unspecified    2. Sciatica of left side        PLAN:  1. D/C Home  2. Current Discharge Medication List      START taking these medications    Details   nitrofurantoin, macrocrystal-monohydrate, (MACROBID) 100 mg capsule Take 1 Cap by mouth two (2) times a day for 3 days. Qty: 6 Cap, Refills: 0      predniSONE (DELTASONE) 50 mg tablet Take 1 Tab by mouth daily for 3 days. Qty: 3 Tab, Refills: 0      methocarbamol (ROBAXIN-750) 750 mg tablet Take 1 Tab by mouth three (3) times daily.   Qty: 20 Tab, Refills: 0           3.    Follow-up Information     Follow up With Specialties Details Why [address] [facility], [address], DO Internal Medicine Schedule an appointment as soon as possible for a visit in 2 days  [address] [facility]  [phone]      [facility] EMERGENCY DEPT Emergency Medicine  If symptoms worsen [address] [address]  [phone]        _______________________________

## 2019-07-18 LAB
BACTERIA SPEC CULT: NORMAL
SERVICE CMNT-IMP: NORMAL

## 2020-01-17 ENCOUNTER — HOSPITAL ENCOUNTER (OUTPATIENT)
Dept: MRI IMAGING | Age: 85
Discharge: HOME | End: 2020-01-17
Attending: PHYSICAL MEDICINE & REHABILITATION
Payer: MEDICARE

## 2020-01-17 DIAGNOSIS — M54.16 LUMBAR RADICULOPATHY: ICD-10-CM

## 2020-01-17 PROCEDURE — 72148 MRI LUMBAR SPINE W/O DYE: CPT

## 2020-05-01 ENCOUNTER — APPOINTMENT (OUTPATIENT)
Dept: GENERAL RADIOLOGY | Age: 85
End: 2020-05-01
Attending: EMERGENCY MEDICINE
Payer: MEDICARE

## 2020-05-01 ENCOUNTER — HOSPITAL ENCOUNTER (EMERGENCY)
Age: 85
Discharge: HOME | End: 2020-05-02
Attending: EMERGENCY MEDICINE
Payer: MEDICARE

## 2020-05-01 DIAGNOSIS — L03.116 CELLULITIS OF LEFT FOOT: ICD-10-CM

## 2020-05-01 DIAGNOSIS — M10.9 ACUTE GOUT OF LEFT FOOT, UNSPECIFIED CAUSE: Primary | ICD-10-CM

## 2020-05-01 DIAGNOSIS — I10 UNCONTROLLED HYPERTENSION: ICD-10-CM

## 2020-05-01 LAB
ANION GAP SERPL CALC-SCNC: 6 MMOL/L (ref 3–18)
BASOPHILS # BLD: 0 K/UL (ref 0–0.1)
BASOPHILS NFR BLD: 0 % (ref 0–2)
BUN SERPL-MCNC: 14 MG/DL (ref 7–18)
BUN/CREAT SERPL: 14 (ref 12–20)
CALCIUM SERPL-MCNC: 9.2 MG/DL (ref 8.5–10.1)
CHLORIDE SERPL-SCNC: 110 MMOL/L (ref 100–111)
CO2 SERPL-SCNC: 25 MMOL/L (ref 21–32)
CREAT SERPL-MCNC: 1.03 MG/DL (ref 0.6–1.3)
D DIMER PPP FEU-MCNC: 1.47 UG/ML(FEU)
DIFFERENTIAL METHOD BLD: ABNORMAL
EOSINOPHIL # BLD: 0.2 K/UL (ref 0–0.4)
EOSINOPHIL NFR BLD: 2 % (ref 0–5)
ERYTHROCYTE [DISTWIDTH] IN BLOOD BY AUTOMATED COUNT: 14.7 % (ref 11.6–14.5)
GLUCOSE SERPL-MCNC: 113 MG/DL (ref 74–99)
HCT VFR BLD AUTO: 37.4 % (ref 35–45)
HGB BLD-MCNC: 12.4 G/DL (ref 12–16)
LYMPHOCYTES # BLD: 2.4 K/UL (ref 0.9–3.6)
LYMPHOCYTES NFR BLD: 20 % (ref 21–52)
MCH RBC QN AUTO: 29.3 PG (ref 24–34)
MCHC RBC AUTO-ENTMCNC: 33.2 G/DL (ref 31–37)
MCV RBC AUTO: 88.4 FL (ref 74–97)
MONOCYTES # BLD: 1.1 K/UL (ref 0.05–1.2)
MONOCYTES NFR BLD: 9 % (ref 3–10)
NEUTS SEG # BLD: 8.1 K/UL (ref 1.8–8)
NEUTS SEG NFR BLD: 69 % (ref 40–73)
PLATELET # BLD AUTO: 325 K/UL (ref 135–420)
PMV BLD AUTO: 10.4 FL (ref 9.2–11.8)
POTASSIUM SERPL-SCNC: 4.6 MMOL/L (ref 3.5–5.5)
RBC # BLD AUTO: 4.23 M/UL (ref 4.2–5.3)
SODIUM SERPL-SCNC: 141 MMOL/L (ref 136–145)
URATE SERPL-MCNC: 7.9 MG/DL (ref 2.6–7.2)
WBC # BLD AUTO: 11.8 K/UL (ref 4.6–13.2)

## 2020-05-01 PROCEDURE — 96375 TX/PRO/DX INJ NEW DRUG ADDON: CPT

## 2020-05-01 PROCEDURE — 73630 X-RAY EXAM OF FOOT: CPT

## 2020-05-01 PROCEDURE — 99284 EMERGENCY DEPT VISIT MOD MDM: CPT

## 2020-05-01 PROCEDURE — 85379 FIBRIN DEGRADATION QUANT: CPT

## 2020-05-01 PROCEDURE — 84550 ASSAY OF BLOOD/URIC ACID: CPT

## 2020-05-01 PROCEDURE — 74011250636 HC RX REV CODE- 250/636: Performed by: EMERGENCY MEDICINE

## 2020-05-01 PROCEDURE — 85025 COMPLETE CBC W/AUTO DIFF WBC: CPT

## 2020-05-01 PROCEDURE — 85652 RBC SED RATE AUTOMATED: CPT

## 2020-05-01 PROCEDURE — 80048 BASIC METABOLIC PNL TOTAL CA: CPT

## 2020-05-01 PROCEDURE — 96374 THER/PROPH/DIAG INJ IV PUSH: CPT

## 2020-05-01 RX ORDER — KETOROLAC TROMETHAMINE 30 MG/ML
15 INJECTION, SOLUTION INTRAMUSCULAR; INTRAVENOUS ONCE
Status: COMPLETED | OUTPATIENT
Start: 2020-05-01 | End: 2020-05-01

## 2020-05-01 RX ADMIN — KETOROLAC TROMETHAMINE 15 MG: 30 INJECTION, SOLUTION INTRAMUSCULAR at 23:17

## 2020-05-02 ENCOUNTER — APPOINTMENT (OUTPATIENT)
Dept: VASCULAR SURGERY | Age: 85
End: 2020-05-02
Attending: EMERGENCY MEDICINE
Payer: MEDICARE

## 2020-05-02 VITALS
TEMPERATURE: 98.4 F | DIASTOLIC BLOOD PRESSURE: 55 MMHG | OXYGEN SATURATION: 100 % | HEART RATE: 52 BPM | SYSTOLIC BLOOD PRESSURE: 162 MMHG | WEIGHT: 235 LBS | BODY MASS INDEX: 41.64 KG/M2 | HEIGHT: 63 IN | RESPIRATION RATE: 18 BRPM

## 2020-05-02 LAB — ERYTHROCYTE [SEDIMENTATION RATE] IN BLOOD: 32 MM/HR (ref 0–30)

## 2020-05-02 PROCEDURE — 93971 EXTREMITY STUDY: CPT

## 2020-05-02 PROCEDURE — 74011250636 HC RX REV CODE- 250/636: Performed by: EMERGENCY MEDICINE

## 2020-05-02 PROCEDURE — 74011250637 HC RX REV CODE- 250/637: Performed by: EMERGENCY MEDICINE

## 2020-05-02 RX ORDER — COLCHICINE 0.6 MG/1
1.2 TABLET ORAL
Status: COMPLETED | OUTPATIENT
Start: 2020-05-02 | End: 2020-05-02

## 2020-05-02 RX ORDER — CEFTRIAXONE 1 G/1
1 INJECTION, POWDER, FOR SOLUTION INTRAMUSCULAR; INTRAVENOUS
Status: COMPLETED | OUTPATIENT
Start: 2020-05-02 | End: 2020-05-02

## 2020-05-02 RX ORDER — CEPHALEXIN 500 MG/1
500 CAPSULE ORAL 4 TIMES DAILY
Qty: 28 CAP | Refills: 0 | Status: SHIPPED | OUTPATIENT
Start: 2020-05-02 | End: 2020-05-09

## 2020-05-02 RX ORDER — HYDROCODONE BITARTRATE AND ACETAMINOPHEN 5; 325 MG/1; MG/1
1 TABLET ORAL
Qty: 12 TAB | Refills: 0 | Status: SHIPPED | OUTPATIENT
Start: 2020-05-02 | End: 2020-05-05

## 2020-05-02 RX ORDER — COLCHICINE 0.6 MG/1
0.6 TABLET ORAL ONCE
Qty: 1 TAB | Refills: 0 | Status: SHIPPED | OUTPATIENT
Start: 2020-05-02 | End: 2020-05-02

## 2020-05-02 RX ORDER — HYDROCODONE BITARTRATE AND ACETAMINOPHEN 5; 325 MG/1; MG/1
1 TABLET ORAL
Status: COMPLETED | OUTPATIENT
Start: 2020-05-02 | End: 2020-05-02

## 2020-05-02 RX ADMIN — HYDROCODONE BITARTRATE AND ACETAMINOPHEN 1 TABLET: 5; 325 TABLET ORAL at 02:15

## 2020-05-02 RX ADMIN — COLCHICINE 1.2 MG: 0.6 TABLET, FILM COATED ORAL at 02:14

## 2020-05-02 RX ADMIN — CEFTRIAXONE 1 G: 1 INJECTION, POWDER, FOR SOLUTION INTRAMUSCULAR; INTRAVENOUS at 02:16

## 2020-05-02 NOTE — ED PROVIDER NOTES
EMERGENCY DEPARTMENT HISTORY AND PHYSICAL EXAM    Date: 5/1/2020  Patient Name: Anjel Salgado    History of Presenting Illness     Chief Complaint   Patient presents with    Foot Swelling         History Provided By: Patient    Chief Complaint: Left foot pain and swelling  Duration:2 Days  Timing:  Acute  Location: Left foot  Quality: Sharp  Severity: 8 out of 10  Modifying Factors: Movement weightbearing  Associated Symptoms: Left leg swelling    Additional History (Context):   10:41 PM  Anjel Salgado is a 80 y.o. female with PMHX of hypertension and gout who presents to the emergency department C/O left foot pain swelling redness for 2 days. Associated sxs include left leg swelling. Pt denies fevers chest pain or shortness of breath, and any other sxs or complaints. Patient has a history of gout and she noted onset of left foot pain redness Wednesday evening. She went to her primary doctor Dr. Cortes who called and colchicine. She took the colchicine without relief. Since then she has had progressive left foot pain swelling she is concerned she may have cellulitis like she is had in the past.  She denies any fevers. She denies any chest pain or shortness of breath. Remote history of DVT 20 years ago. No history of PE.    PCP: Ling Oviedo, DO    Current Outpatient Medications   Medication Sig Dispense Refill    cephALEXin (Keflex) 500 mg capsule Take 1 Cap by mouth four (4) times daily for 7 days. 28 Cap 0    HYDROcodone-acetaminophen (NORCO) 5-325 mg per tablet Take 1 Tab by mouth every six (6) hours as needed for Pain for up to 3 days. Max Daily Amount: 4 Tabs. 12 Tab 0    colchicine 0.6 mg tablet Take 1 Tab by mouth once for 1 dose. 1 Tab 0    colchicine (COLCRYS) 0.6 mg tablet Take 0.6 mg by mouth as needed.  meclizine (ANTIVERT) 12.5 mg tablet Take 12.5 mg by mouth three (3) times daily as needed.       olmesartan-hydroCHLOROthiazide (BENICAR HCT) 20-12.5 mg per tablet Take 1 Tab by mouth daily.  folic acid/multivit-min/lutein (CENTRUM SILVER PO) Take  by mouth daily.  Dexlansoprazole (DEXILANT) 60 mg CpDB Take 60 mg by mouth as needed.  potassium chloride (KLOR-CON) 20 mEq packet Take 20 mEq by mouth daily.  dipyridamole-aspirin (AGGRENOX) 200-25 mg per SR capsule Take 1 Cap by mouth daily.  calcium carbonate (TUMS) 200 mg calcium (500 mg) Chew Take 1 Tab by mouth as needed.            Past History     Past Medical History:  Past Medical History:   Diagnosis Date    Arthritis     osteo    Asthma     inhaler as needed - every 2 to 3 years    Chronic pain     knee and hip ,shoulder     GERD (gastroesophageal reflux disease)     Hypertension     5yrs    Irritable bowel     Other ill-defined conditions(799.89)     h/o migraines    Other ill-defined conditions(799.89)     h/o dizzy    Other ill-defined conditions(799.89)     IBS; hiatal hernia    Psychiatric disorder     depression    Stroke ([facility] [address] 9 years ago     stroke - no deficits    Stroke ([facility] [address]     In 1997/98       Past Surgical History:  Past Surgical History:   Procedure Laterality Date    CARDIAC SURG PROCEDURE UNLIST      HX CHOLECYSTECTOMY      HX HERNIA REPAIR      umbilical twice    HX HYSTERECTOMY      HX INTRAOCULAR LENS PROSTHESIS      HX KNEE ARTHROSCOPY      left    HX KNEE REPLACEMENT Left 2013    HX OTHER SURGICAL      herniated disc surgery    HX TUBAL LIGATION      LAP UMBILICAL HERNIA REPAIR      LAP,CHOLECYSTECTOMY      NEUROLOGICAL PROCEDURE UNLISTED      lower back       Family History:  Family History   Problem Relation Age of Onset    Cancer Father     Malignant Hyperthermia Neg Hx     Pseudocholinesterase Deficiency Neg Hx     Post-op Nausea/Vomiting Neg Hx     Delayed Awakening Neg Hx     Emergence Delirium Neg Hx     Post-op Cognitive Dysfunction Neg Hx     Other Neg Hx        Social History:  Social History     Tobacco Use    Smoking status: Never Smoker    Smokeless tobacco: Never Used   Substance Use Topics    Alcohol use: No    Drug use: No       Allergies:  No Known Allergies      Review of Systems   Review of Systems   Constitutional: Negative for appetite change, chills and fever. HENT: Negative for congestion and sore throat. Eyes: Negative for pain and redness. Respiratory: Negative for cough and shortness of breath. Cardiovascular: Positive for leg swelling. Negative for chest pain and palpitations. Gastrointestinal: Negative for abdominal pain, diarrhea, nausea and vomiting. Endocrine: Negative for polydipsia and polyuria. Genitourinary: Negative for difficulty urinating and dysuria. Musculoskeletal: Positive for arthralgias. Negative for back pain and neck pain. Left foot pain redness and swelling   Skin: Negative for pallor and rash. Neurological: Negative for weakness and headaches. All other systems reviewed and are negative. Physical Exam     Vitals:    05/02/20 0000 05/02/20 0059 05/02/20 0223 05/02/20 0227   BP: 143/72 (!) 191/98 (!) 210/62 (!) 201/56   Pulse:       Resp:       Temp:       SpO2: 98% 100% 98%    Weight:       Height:         Physical Exam  Vitals signs and nursing note reviewed. Constitutional:       Appearance: She is well-developed. She is obese. HENT:      Head: Normocephalic and atraumatic. Right Ear: External ear normal.      Left Ear: External ear normal.      Nose: Nose normal.      Mouth/Throat:      Mouth: Mucous membranes are moist.      Pharynx: Oropharynx is clear. Eyes:      Extraocular Movements: Extraocular movements intact. Conjunctiva/sclera: Conjunctivae normal.      Pupils: Pupils are equal, round, and reactive to light. Neck:      Musculoskeletal: Normal range of motion and neck supple. Vascular: No JVD. Trachea: No tracheal deviation. Cardiovascular:      Rate and Rhythm: Normal rate and regular rhythm.       Heart sounds: Normal heart sounds. No murmur. No friction rub. No gallop. Pulmonary:      Effort: Pulmonary effort is normal. No respiratory distress. Breath sounds: Normal breath sounds. No wheezing or rales. Abdominal:      General: Bowel sounds are normal. There is no distension. Palpations: Abdomen is soft. There is no mass. Tenderness: There is no abdominal tenderness. There is no guarding or rebound. Musculoskeletal: Normal range of motion. General: Swelling and tenderness present. No deformity. Left lower leg: Edema present. Comments: Left lateral foot with erythema, warmth, swelling and tenderness. Distal sensation intact to light touch and position sense. Patient unable to weight-bear secondary to pain   Skin:     General: Skin is warm and dry. Capillary Refill: Capillary refill takes less than 2 seconds. Findings: No rash. Neurological:      General: No focal deficit present. Mental Status: She is alert and oriented to person, place, and time. Cranial Nerves: No cranial nerve deficit. Deep Tendon Reflexes: Reflexes are normal and symmetric. Comments: CN II-XII intact, no facial droop or asymmetry;  No pronator drift; good  and equal strength 5/5 bilateral upper and lower extremities; sensation is intact to light touch and position sense upper and lower extremities symmetric bilatrally;  Gait antalgic   Psychiatric:         Mood and Affect: Mood normal.         Behavior: Behavior normal.           Diagnostic Study Results     Labs -     Recent Results (from the past 24 hour(s))   CBC WITH AUTOMATED DIFF    Collection Time: 05/01/20 11:06 PM   Result Value Ref Range    WBC 11.8 4.6 - 13.2 K/uL    RBC 4.23 4.20 - 5.30 M/uL    HGB 12.4 12.0 - 16.0 g/dL    HCT 37.4 35.0 - 45.0 %    MCV 88.4 74.0 - 97.0 FL    MCH 29.3 24.0 - 34.0 PG    MCHC 33.2 31.0 - 37.0 g/dL    RDW 14.7 (H) 11.6 - 14.5 %    PLATELET 808 110 - 842 K/uL    MPV 10.4 9.2 - 11.8 FL    NEUTROPHILS 69 40 - 73 %    LYMPHOCYTES 20 (L) 21 - 52 %    MONOCYTES 9 3 - 10 %    EOSINOPHILS 2 0 - 5 %    BASOPHILS 0 0 - 2 %    ABS. NEUTROPHILS 8.1 (H) 1.8 - 8.0 K/UL    ABS. LYMPHOCYTES 2.4 0.9 - 3.6 K/UL    ABS. MONOCYTES 1.1 0.05 - 1.2 K/UL    ABS. EOSINOPHILS 0.2 0.0 - 0.4 K/UL    ABS. BASOPHILS 0.0 0.0 - 0.1 K/UL    DF AUTOMATED     D DIMER    Collection Time: 05/01/20 11:06 PM   Result Value Ref Range    D DIMER 1.47 (H) <0.46 ug/ml(FEU)   METABOLIC PANEL, BASIC    Collection Time: 05/01/20 11:06 PM   Result Value Ref Range    Sodium 141 136 - 145 mmol/L    Potassium 4.6 3.5 - 5.5 mmol/L    Chloride 110 100 - 111 mmol/L    CO2 25 21 - 32 mmol/L    Anion gap 6 3.0 - 18 mmol/L    Glucose 113 (H) 74 - 99 mg/dL    BUN 14 7.0 - 18 MG/DL    Creatinine 1.03 0.6 - 1.3 MG/DL    BUN/Creatinine ratio 14 12 - 20      GFR est AA >60 >60 ml/min/1.73m2    GFR est non-AA 51 (L) >60 ml/min/1.73m2    Calcium 9.2 8.5 - 10.1 MG/DL   URIC ACID    Collection Time: 05/01/20 11:06 PM   Result Value Ref Range    Uric acid 7.9 (H) 2.6 - 7.2 MG/DL   SED RATE (ESR)    Collection Time: 05/01/20 11:06 PM   Result Value Ref Range    Sed rate, automated 32 (H) 0 - 30 mm/hr       Radiologic Studies -  XR FOOT LT MIN 3 V   Final Result   IMPRESSION: No acute fractures or subluxation of left foot. Periosteal reaction   along the shafts of the second through the fourth metatarsals suggesting stress   type injury. Small sharp plantar fascia calcaneal spur and degenerative changes   of first MTP joint. Soft tissue swelling of the forefoot possibly representing cellulitis.          CT Results  (Last 48 hours)    None        CXR Results  (Last 48 hours)    None          Medications given in the ED-  Medications   ketorolac (TORADOL) injection 15 mg (15 mg IntraVENous Given 5/1/20 4705)   cefTRIAXone (ROCEPHIN) injection 1 g (1 g IntraVENous Given 5/2/20 0216)   colchicine tablet 1.2 mg (1.2 mg Oral Given 5/2/20 0214)   HYDROcodone-acetaminophen ([address]) 5-325 mg per tablet 1 Tab (1 Tab Oral Given 5/2/20 3645)         Medical Decision Making   I am the first provider for this patient. I reviewed the vital signs, available nursing notes, past medical history, past surgical history, family history and social history. Vital Signs-Reviewed the patient's vital signs. Records Reviewed: Nursing Notes    Provider Notes (Medical Decision Making):   DDx-gout, cellulitis, fracture, contusion    Procedures:  Procedures    ED Course:   Initial assessment performed. The patients presenting problems have been discussed, and they are in agreement with the care plan formulated and outlined with them. I have encouraged them to ask questions as they arise throughout their visit. 03:45  Improvement with near resolution of her pain. She has marked decreased erythema swelling her left lateral foot. Diagnosis and Disposition   DISCUSSION:  Patient was hypertensive with left foot pain in the emergency department, improved after IV Toradol, colchicine and [place].  Labs remarkable for elevated uric acid and elevated d-dimer. Duplex Doppler of left lower extremity showed no evidence of DVT. Left foot x-ray showed DJD and soft tissue swelling consistent with cellulitis. No fracture. Patient was given Rocephin IV. Patient likely has a combination of gout and cellulitis. Will give prescriptions for Keflex colchicine and [place].  Patient is to follow-up with her PCP for further evaluation. Patient advised to return to the emergency department if she develops recurrent pain, fevers, vomiting or shortness of breath. DISCHARGE NOTE:  Fabiendennis Reina's  results have been reviewed with her. She has been counseled regarding her diagnosis, treatment, and plan. She verbally conveys understanding and agreement of the signs, symptoms, diagnosis, treatment and prognosis and additionally agrees to follow up as discussed.   She also agrees with the care-plan and conveys that all of her questions have been answered. I have also provided discharge instructions for her that include: educational information regarding their diagnosis and treatment, and list of reasons why they would want to return to the ED prior to their follow-up appointment, should her condition change. She has been provided with education for proper emergency department utilization. CLINICAL IMPRESSION:    1. Acute gout of left foot, unspecified cause    2. Cellulitis of left foot    3. Uncontrolled hypertension        PLAN:  1. D/C Home  2. Current Discharge Medication List      START taking these medications    Details   cephALEXin (Keflex) 500 mg capsule Take 1 Cap by mouth four (4) times daily for 7 days. Qty: 28 Cap, Refills: 0      HYDROcodone-acetaminophen (NORCO) 5-325 mg per tablet Take 1 Tab by mouth every six (6) hours as needed for Pain for up to 3 days. Max Daily Amount: 4 Tabs. Qty: 12 Tab, Refills: 0    Associated Diagnoses: Acute gout of left foot, unspecified cause      !! colchicine 0.6 mg tablet Take 1 Tab by mouth once for 1 dose. Qty: 1 Tab, Refills: 0       !! - Potential duplicate medications found. Please discuss with provider. CONTINUE these medications which have NOT CHANGED    Details   !! colchicine (COLCRYS) 0.6 mg tablet Take 0.6 mg by mouth as needed. !! - Potential duplicate medications found. Please discuss with provider. 3.   Follow-up Information     Follow up With Specialties Details Why [address] [place] [address], [facility], DO Internal Medicine Call in 2 days For further evaluation [address] [facility]  [phone]                Please note that this dictation was completed with [employer], the computer voice recognition software. Quite often unanticipated grammatical, syntax, homophones, and other interpretive errors are inadvertently transcribed by the computer software. Please disregard these errors.   Please excuse any errors that have escaped final proofreading.

## 2020-05-02 NOTE — ED NOTES
Pt is lying in bed, aox4, breathing is unlabored, speaks in full sentences. Pt reports pain has decreased rates 2/10. Call light is within reach, no acute distress nesha.

## 2020-05-02 NOTE — ED TRIAGE NOTES
Patient arrives to ED with c/c of left foot swelling. Patient reports swelling to that foot 4 days ago but the swelling went down and this morning she again woke up and had swelling to that foot again. Patient reports having gout but colchicine is not helping.

## 2020-05-02 NOTE — ED NOTES
Pt is lying in bed, asleep, awakens to voice. Pt reports pain decreased w medications, and can now only feel the stinging sensation, not the throbbing, rates 5/10. Pt denies any toiletry needs nesha, no acute distress, call light within reach.

## 2020-05-02 NOTE — ED NOTES
Pt is aox4, breathing is unlabored, speaks in full sentences, reports LLE pain is 0/10. Pt verbalizes understanding of discharge instructions.

## 2020-05-02 NOTE — DISCHARGE INSTRUCTIONS
Patient Education        Gout: Care Instructions  Your Care Instructions    Gout is a form of arthritis caused by a buildup of uric acid crystals in a joint. It causes sudden attacks of pain, swelling, redness, and stiffness, usually in one joint, especially the big toe. Gout usually comes on without a cause. But it can be brought on by drinking alcohol (especially beer) or eating seafood and red meat. Taking certain medicines, such as diuretics or aspirin, also can bring on an attack of gout. Taking your medicines as prescribed and following up with your doctor regularly can help you avoid gout attacks in the future. Follow-up care is a key part of your treatment and safety. Be sure to make and go to all appointments, and call your doctor if you are having problems. It's also a good idea to know your test results and keep a list of the medicines you take. How can you care for yourself at home? · If the joint is swollen, put ice or a cold pack on the area for 10 to 20 minutes at a time. Put a thin cloth between the ice and your skin. · Prop up the sore limb on a pillow when you ice it or anytime you sit or lie down during the next 3 days. Try to keep it above the level of your heart. This will help reduce swelling. · Rest sore joints. Avoid activities that put weight or strain on the joints for a few days. Take short rest breaks from your regular activities during the day. · Take your medicines exactly as prescribed. Call your doctor if you think you are having a problem with your medicine. · Take pain medicines exactly as directed. ? If the doctor gave you a prescription medicine for pain, take it as prescribed. ? If you are not taking a prescription pain medicine, ask your doctor if you can take an over-the-counter medicine. · Eat less seafood and red meat. · Check with your doctor before drinking alcohol. · Losing weight, if you are overweight, may help reduce attacks of gout.  But do not go on a \"crash diet. \" Losing a lot of weight in a short amount of time can cause a gout attack. When should you call for help? Call your doctor now or seek immediate medical care if:    · You have a fever.     · The joint is so painful you cannot use it.     · You have sudden, unexplained swelling, redness, warmth, or severe pain in one or more joints.    Watch closely for changes in your health, and be sure to contact your doctor if:    · You have joint pain.     · Your symptoms get worse or are not improving after 2 or 3 days. Where can you learn more? Go to http://shell-david.info/  Enter E531 in the search box to learn more about \"Gout: Care Instructions. \"  Current as of: December 8, 2019Content Version: 12.4  © 2161-5720 [employer]. Care instructions adapted under license by [employer] (which disclaims liability or warranty for this information). If you have questions about a medical condition or this instruction, always ask your healthcare professional. [address] any warranty or liability for your use of this information. Patient Education        Purine-Restricted Diet: Care Instructions  Your Care Instructions    Purines are substances that are found in some foods. Your body turns purines into uric acid. High levels of uric acid can cause gout, which is a form of arthritis that causes pain and inflammation in joints. You may be able to help control the amount of uric acid in your body by limiting high-purine foods in your diet. Follow-up care is a key part of your treatment and safety. Be sure to make and go to all appointments, and call your doctor if you are having problems. It's also a good idea to know your test results and keep a list of the medicines you take. How can you care for yourself at home? · Plan your meals and snacks around foods that are low in purines and are safe for you to eat.  These foods include:  ? Tab Last vegetables and tomatoes. ? Fruits. ? Whole-grain breads, rice, and cereals. ? Eggs, peanut butter, and nuts. ? Low-fat milk, cheese, and other milk products. ? Popcorn. ? Gelatin desserts, chocolate, cocoa, and cakes and sweets, in small amounts. · You can eat certain foods that are medium-high in purines, but eat them only once in a while. These foods include:  ? Legumes, such as dried beans and dried peas. You can have 1 cup cooked legumes each day. ? Asparagus, cauliflower, spinach, mushrooms, and green peas. ? Fish and seafood (other than very high-purine seafood). ? Oatmeal, wheat bran, and wheat germ. · Limit very high-purine foods, including:  ? Organ meats, such as liver, kidneys, sweetbreads, and brains. ? Meats, including oreilly, beef, pork, and lamb. ? Game meats and any other meats in large amounts. ? Anchovies, sardines, herring, mackerel, and scallops. ? Gravy. ? Beer. Where can you learn more? Go to http://shell-david.info/  Enter F448 in the search box to learn more about \"Purine-Restricted Diet: Care Instructions. \"  Current as of: August 21, 2019Content Version: 12.4  © 2596-6946 [employer]. Care instructions adapted under license by [employer] [place] (which disclaims liability or warranty for this information). If you have questions about a medical condition or this instruction, always ask your healthcare professional. [place] [address] any warranty or liability for your use of this information. Patient Education        High Blood Pressure: Care Instructions  Overview    It's normal for blood pressure to go up and down throughout the day. But if it stays up, you have high blood pressure. Another name for high blood pressure is hypertension. Despite what a lot of people think, high blood pressure usually doesn't cause headaches or make you feel dizzy or lightheaded. It usually has no symptoms.  But it does increase your risk of stroke, heart attack, and other problems. You and your doctor will talk about your risks of these problems based on your blood pressure. Your doctor will give you a goal for your blood pressure. Your goal will be based on your health and your age. Lifestyle changes, such as eating healthy and being active, are always important to help lower blood pressure. You might also take medicine to reach your blood pressure goal.  Follow-up care is a key part of your treatment and safety. Be sure to make and go to all appointments, and call your doctor if you are having problems. It's also a good idea to know your test results and keep a list of the medicines you take. How can you care for yourself at home? Medical treatment  · If you stop taking your medicine, your blood pressure will go back up. You may take one or more types of medicine to lower your blood pressure. Be safe with medicines. Take your medicine exactly as prescribed. Call your doctor if you think you are having a problem with your medicine. · Talk to your doctor before you start taking aspirin every day. Aspirin can help certain people lower their risk of a heart attack or stroke. But taking aspirin isn't right for everyone, because it can cause serious bleeding. · See your doctor regularly. You may need to see the doctor more often at first or until your blood pressure comes down. · If you are taking blood pressure medicine, talk to your doctor before you take decongestants or anti-inflammatory medicine, such as ibuprofen. Some of these medicines can raise blood pressure. · Learn how to check your blood pressure at home. Lifestyle changes  · Stay at a healthy weight. This is especially important if you put on weight around the waist. Losing even 10 pounds can help you lower your blood pressure. · If your doctor recommends it, get more exercise. Walking is a good choice. Bit by bit, increase the amount you walk every day.  Try for at least 30 minutes on most days of the week. You also may want to swim, bike, or do other activities. · Avoid or limit alcohol. Talk to your doctor about whether you can drink any alcohol. · Try to limit how much sodium you eat to less than 2,300 milligrams (mg) a day. Your doctor may ask you to try to eat less than 1,500 mg a day. · Eat plenty of fruits (such as bananas and oranges), vegetables, legumes, whole grains, and low-fat dairy products. · Lower the amount of saturated fat in your diet. Saturated fat is found in animal products such as milk, cheese, and meat. Limiting these foods may help you lose weight and also lower your risk for heart disease. · Do not smoke. Smoking increases your risk for heart attack and stroke. If you need help quitting, talk to your doctor about stop-smoking programs and medicines. These can increase your chances of quitting for good. When should you call for help? Call  911 anytime you think you may need emergency care. This may mean having symptoms that suggest that your blood pressure is causing a serious heart or blood vessel problem. Your blood pressure may be over 180/120.   For example, call  911 if:    · You have symptoms of a heart attack. These may include:  ? Chest pain or pressure, or a strange feeling in the chest.  ? Sweating. ? Shortness of breath. ? Nausea or vomiting. ? Pain, pressure, or a strange feeling in the back, neck, jaw, or upper belly or in one or both shoulders or arms. ? Lightheadedness or sudden weakness. ? A fast or irregular heartbeat.     · You have symptoms of a stroke. These may include:  ? Sudden numbness, tingling, weakness, or loss of movement in your face, arm, or leg, especially on only one side of your body. ? Sudden vision changes. ? Sudden trouble speaking. ? Sudden confusion or trouble understanding simple statements. ? Sudden problems with walking or balance.   ? A sudden, severe headache that is different from past headaches.     · You have severe back or belly pain.    Do not wait until your blood pressure comes down on its own. Get help right away.   Call your doctor now or seek immediate care if:    · Your blood pressure is much higher than normal (such as 180/120 or higher), but you don't have symptoms.     · You think high blood pressure is causing symptoms, such as:  ? Severe headache.  ? Blurry vision.    Watch closely for changes in your health, and be sure to contact your doctor if:    · Your blood pressure measures higher than your doctor recommends at least 2 times. That means the top number is higher or the bottom number is higher, or both.     · You think you may be having side effects from your blood pressure medicine. Where can you learn more? Go to http://shell-david.info/  Enter [id number] in the search box to learn more about \"High Blood Pressure: Care Instructions. \"  Current as of: December 15, 2019Content Version: 12.4  © 0637-1768 [employer]. Care instructions adapted under license by [employer] (which disclaims liability or warranty for this information). If you have questions about a medical condition or this instruction, always ask your healthcare professional. [place] [address] any warranty or liability for your use of this information. Patient Education        Low Sodium Diet (2,000 Milligram): Care Instructions  Your Care Instructions    Too much sodium causes your body to hold on to extra water. This can raise your blood pressure and force your heart and kidneys to work harder. In very serious cases, this could cause you to be put in the hospital. It might even be life-threatening. By limiting sodium, you will feel better and lower your risk of serious problems. The most common source of sodium is salt. People get most of the salt in their diet from canned, prepared, and packaged foods.  Fast food and restaurant meals also are very high in sodium. Your doctor will probably limit your sodium to less than 2,000 milligrams (mg) a day. This limit counts all the sodium in prepared and packaged foods and any salt you add to your food. Follow-up care is a key part of your treatment and safety. Be sure to make and go to all appointments, and call your doctor if you are having problems. It's also a good idea to know your test results and keep a list of the medicines you take. How can you care for yourself at home? Read food labels  · Read labels on cans and food packages. The labels tell you how much sodium is in each serving. Make sure that you look at the serving size. If you eat more than the serving size, you have eaten more sodium. · Food labels also tell you the Percent Daily Value for sodium. Choose products with low Percent Daily Values for sodium. · Be aware that sodium can come in forms other than salt, including monosodium glutamate (MSG), sodium citrate, and sodium bicarbonate (baking soda). MSG is often added to Asian food. When you eat out, you can sometimes ask for food without MSG or added salt. Buy low-sodium foods  · Buy foods that are labeled \"unsalted\" (no salt added), \"sodium-free\" (less than 5 mg of sodium per serving), or \"low-sodium\" (less than 140 mg of sodium per serving). Foods labeled \"reduced-sodium\" and \"light sodium\" may still have too much sodium. Be sure to read the label to see how much sodium you are getting. · Buy fresh vegetables, or frozen vegetables without added sauces. Buy low-sodium versions of canned vegetables, soups, and other canned goods. Prepare low-sodium meals  · Cut back on the amount of salt you use in cooking. This will help you adjust to the taste. Do not add salt after cooking. One teaspoon of salt has about 2,300 mg of sodium. · Take the salt shaker off the table. · Flavor your food with garlic, lemon juice, onion, vinegar, herbs, and spices.  Do not use soy sauce, lite soy sauce, steak sauce, onion salt, garlic salt, celery salt, mustard, or ketchup on your food. · Use low-sodium salad dressings, sauces, and ketchup. Or make your own salad dressings and sauces without adding salt. · Use less salt (or none) when recipes call for it. You can often use half the salt a recipe calls for without losing flavor. Other foods such as rice, pasta, and grains do not need added salt. · Rinse canned vegetables, and cook them in fresh water. This removes some--but not all--of the salt. · Avoid water that is naturally high in sodium or that has been treated with water softeners, which add sodium. Call your local water company to find out the sodium content of your water supply. If you buy bottled water, read the label and choose a sodium-free brand. Avoid high-sodium foods  · Avoid eating:  ? Smoked, cured, salted, and canned meat, fish, and poultry. ? Ham, oreilly, hot dogs, and luncheon meats. ? Regular, hard, and processed cheese and regular peanut butter. ? Crackers with salted tops, and other salted snack foods such as pretzels, chips, and salted popcorn. ? Frozen prepared meals, unless labeled low-sodium. ? Canned and dried soups, broths, and bouillon, unless labeled sodium-free or low-sodium. ? Canned vegetables, unless labeled sodium-free or low-sodium. ? [place] fries, pizza, tacos, and other fast foods. ? Pickles, olives, ketchup, and other condiments, especially soy sauce, unless labeled sodium-free or low-sodium. Where can you learn more? Go to http://shell-david.info/  Enter V843 in the search box to learn more about \"Low Sodium Diet (2,000 Milligram): Care Instructions. \"  Current as of: August 21, 2019Content Version: 12.4  © 1134-1747 [employer]. Care instructions adapted under license by [employer] (which disclaims liability or warranty for this information).  If you have questions about a medical condition or this instruction, always ask your healthcare professional. [place] [address] any warranty or liability for your use of this information. Patient Education        Cellulitis: Care Instructions  Your Care Instructions    Cellulitis is a skin infection caused by bacteria, most often strep or staph. It often occurs after a break in the skin from a scrape, cut, bite, or puncture, or after a rash. Cellulitis may be treated without doing tests to find out what caused it. But your doctor may do tests, if needed, to look for a specific bacteria, like methicillin-resistant Staphylococcus aureus (MRSA). The doctor has checked you carefully, but problems can develop later. If you notice any problems or new symptoms, get medical treatment right away. Follow-up care is a key part of your treatment and safety. Be sure to make and go to all appointments, and call your doctor if you are having problems. It's also a good idea to know your test results and keep a list of the medicines you take. How can you care for yourself at home? · Take your antibiotics as directed. Do not stop taking them just because you feel better. You need to take the full course of antibiotics. · Prop up the infected area on pillows to reduce pain and swelling. Try to keep the area above the level of your heart as often as you can. · If your doctor told you how to care for your wound, follow your doctor's instructions. If you did not get instructions, follow this general advice:  ? Wash the wound with clean water 2 times a day. Don't use hydrogen peroxide or alcohol, which can slow healing. ? You may cover the wound with a thin layer of petroleum jelly, such as Vaseline, and a nonstick bandage. ? Apply more petroleum jelly and replace the bandage as needed. · Be safe with medicines. Take pain medicines exactly as directed. ? If the doctor gave you a prescription medicine for pain, take it as prescribed.   ? If you are not taking a prescription pain medicine, ask your doctor if you can take an over-the-counter medicine. To prevent cellulitis in the future  · Try to prevent cuts, scrapes, or other injuries to your skin. Cellulitis most often occurs where there is a break in the skin. · If you get a scrape, cut, mild burn, or bite, wash the wound with clean water as soon as you can to help avoid infection. Don't use hydrogen peroxide or alcohol, which can slow healing. · If you have swelling in your legs (edema), support stockings and good skin care may help prevent leg sores and cellulitis. · Take care of your feet, especially if you have diabetes or other conditions that increase the risk of infection. Wear shoes and socks. Do not go barefoot. If you have athlete's foot or other skin problems on your feet, talk to your doctor about how to treat them. When should you call for help? Call your doctor now or seek immediate medical care if:    · You have signs that your infection is getting worse, such as:  ? Increased pain, swelling, warmth, or redness. ? Red streaks leading from the area. ? Pus draining from the area. ? A fever.     · You get a rash.    Watch closely for changes in your health, and be sure to contact your doctor if:    · You do not get better as expected. Where can you learn more? Go to http://shell-david.info/  Enter X309 in the search box to learn more about \"Cellulitis: Care Instructions. \"  Current as of: October 30, 2019Content Version: 12.4  © 8541-1592 [employer]. Care instructions adapted under license by [employer] (which disclaims liability or warranty for this information). If you have questions about a medical condition or this instruction, always ask your healthcare professional. [address] any warranty or liability for your use of this information.

## 2020-05-02 NOTE — ED NOTES
R AC IV infiltrated, had to remove, and place dressing on arm. Attempted IV access on opposite arm with no success. (3rd attempt). Yaritza CHOW will attempt next.

## 2020-07-06 ENCOUNTER — HOSPITAL ENCOUNTER (EMERGENCY)
Dept: CT IMAGING | Age: 85
Discharge: HOME | End: 2020-07-06
Attending: EMERGENCY MEDICINE
Payer: MEDICARE

## 2020-07-06 ENCOUNTER — HOSPITAL ENCOUNTER (EMERGENCY)
Age: 85
Discharge: HOME | End: 2020-07-06
Attending: EMERGENCY MEDICINE
Payer: MEDICARE

## 2020-07-06 ENCOUNTER — APPOINTMENT (OUTPATIENT)
Dept: GENERAL RADIOLOGY | Age: 85
End: 2020-07-06
Attending: EMERGENCY MEDICINE
Payer: MEDICARE

## 2020-07-06 VITALS
HEART RATE: 66 BPM | BODY MASS INDEX: 40.12 KG/M2 | SYSTOLIC BLOOD PRESSURE: 159 MMHG | RESPIRATION RATE: 18 BRPM | DIASTOLIC BLOOD PRESSURE: 76 MMHG | OXYGEN SATURATION: 97 % | WEIGHT: 235 LBS | TEMPERATURE: 98.4 F | HEIGHT: 64 IN

## 2020-07-06 DIAGNOSIS — R10.9 RIGHT FLANK PAIN: Primary | ICD-10-CM

## 2020-07-06 DIAGNOSIS — L03.116 CELLULITIS OF LEFT FOOT: ICD-10-CM

## 2020-07-06 LAB
ALBUMIN SERPL-MCNC: 3.5 G/DL (ref 3.4–5)
ALBUMIN/GLOB SERPL: 0.8 {RATIO} (ref 0.8–1.7)
ALP SERPL-CCNC: 79 U/L (ref 45–117)
ALT SERPL-CCNC: 28 U/L (ref 13–56)
ANION GAP SERPL CALC-SCNC: 6 MMOL/L (ref 3–18)
APPEARANCE UR: CLEAR
AST SERPL-CCNC: 30 U/L (ref 10–38)
ATRIAL RATE: 52 BPM
BASOPHILS # BLD: 0 K/UL (ref 0–0.1)
BASOPHILS NFR BLD: 0 % (ref 0–2)
BILIRUB SERPL-MCNC: 0.6 MG/DL (ref 0.2–1)
BILIRUB UR QL: NEGATIVE
BUN SERPL-MCNC: 18 MG/DL (ref 7–18)
BUN/CREAT SERPL: 17 (ref 12–20)
CALCIUM SERPL-MCNC: 8.8 MG/DL (ref 8.5–10.1)
CALCULATED P AXIS, ECG09: 50 DEGREES
CALCULATED R AXIS, ECG10: -9 DEGREES
CALCULATED T AXIS, ECG11: 18 DEGREES
CHLORIDE SERPL-SCNC: 106 MMOL/L (ref 100–111)
CK MB CFR SERPL CALC: 1 % (ref 0–4)
CK MB SERPL-MCNC: 1 NG/ML (ref 5–25)
CK SERPL-CCNC: 104 U/L (ref 26–192)
CO2 SERPL-SCNC: 28 MMOL/L (ref 21–32)
COLOR UR: YELLOW
CREAT SERPL-MCNC: 1.03 MG/DL (ref 0.6–1.3)
DIAGNOSIS, 93000: NORMAL
DIFFERENTIAL METHOD BLD: ABNORMAL
EOSINOPHIL # BLD: 0.2 K/UL (ref 0–0.4)
EOSINOPHIL NFR BLD: 2 % (ref 0–5)
ERYTHROCYTE [DISTWIDTH] IN BLOOD BY AUTOMATED COUNT: 14 % (ref 11.6–14.5)
GLOBULIN SER CALC-MCNC: 4.2 G/DL (ref 2–4)
GLUCOSE SERPL-MCNC: 103 MG/DL (ref 74–99)
GLUCOSE UR STRIP.AUTO-MCNC: NEGATIVE MG/DL
HCT VFR BLD AUTO: 38.9 % (ref 35–45)
HGB BLD-MCNC: 12.3 G/DL (ref 12–16)
HGB UR QL STRIP: NEGATIVE
KETONES UR QL STRIP.AUTO: NEGATIVE MG/DL
LEUKOCYTE ESTERASE UR QL STRIP.AUTO: NEGATIVE
LIPASE SERPL-CCNC: 40 U/L (ref 73–393)
LYMPHOCYTES # BLD: 2.5 K/UL (ref 0.9–3.6)
LYMPHOCYTES NFR BLD: 20 % (ref 21–52)
MAGNESIUM SERPL-MCNC: 1.8 MG/DL (ref 1.6–2.6)
MCH RBC QN AUTO: 29.2 PG (ref 24–34)
MCHC RBC AUTO-ENTMCNC: 31.6 G/DL (ref 31–37)
MCV RBC AUTO: 92.4 FL (ref 74–97)
MONOCYTES # BLD: 1 K/UL (ref 0.05–1.2)
MONOCYTES NFR BLD: 8 % (ref 3–10)
NEUTS SEG # BLD: 8.4 K/UL (ref 1.8–8)
NEUTS SEG NFR BLD: 70 % (ref 40–73)
NITRITE UR QL STRIP.AUTO: NEGATIVE
P-R INTERVAL, ECG05: 174 MS
PH UR STRIP: 5.5 [PH] (ref 5–8)
PLATELET # BLD AUTO: 341 K/UL (ref 135–420)
PMV BLD AUTO: 10.1 FL (ref 9.2–11.8)
POTASSIUM SERPL-SCNC: 4.2 MMOL/L (ref 3.5–5.5)
PROT SERPL-MCNC: 7.7 G/DL (ref 6.4–8.2)
PROT UR STRIP-MCNC: NEGATIVE MG/DL
Q-T INTERVAL, ECG07: 452 MS
QRS DURATION, ECG06: 88 MS
QTC CALCULATION (BEZET), ECG08: 420 MS
RBC # BLD AUTO: 4.21 M/UL (ref 4.2–5.3)
SODIUM SERPL-SCNC: 140 MMOL/L (ref 136–145)
SP GR UR REFRACTOMETRY: 1.02 (ref 1–1.03)
TROPONIN I SERPL-MCNC: 0.03 NG/ML (ref 0–0.04)
UROBILINOGEN UR QL STRIP.AUTO: 0.2 EU/DL (ref 0.2–1)
VENTRICULAR RATE, ECG03: 52 BPM
WBC # BLD AUTO: 12.1 K/UL (ref 4.6–13.2)

## 2020-07-06 PROCEDURE — 74176 CT ABD & PELVIS W/O CONTRAST: CPT

## 2020-07-06 PROCEDURE — 73630 X-RAY EXAM OF FOOT: CPT

## 2020-07-06 PROCEDURE — 74011250637 HC RX REV CODE- 250/637: Performed by: EMERGENCY MEDICINE

## 2020-07-06 PROCEDURE — 81003 URINALYSIS AUTO W/O SCOPE: CPT

## 2020-07-06 PROCEDURE — 71045 X-RAY EXAM CHEST 1 VIEW: CPT

## 2020-07-06 PROCEDURE — 80053 COMPREHEN METABOLIC PANEL: CPT

## 2020-07-06 PROCEDURE — 93005 ELECTROCARDIOGRAM TRACING: CPT

## 2020-07-06 PROCEDURE — 83690 ASSAY OF LIPASE: CPT

## 2020-07-06 PROCEDURE — 74011250636 HC RX REV CODE- 250/636: Performed by: EMERGENCY MEDICINE

## 2020-07-06 PROCEDURE — 85025 COMPLETE CBC W/AUTO DIFF WBC: CPT

## 2020-07-06 PROCEDURE — 83735 ASSAY OF MAGNESIUM: CPT

## 2020-07-06 PROCEDURE — 99285 EMERGENCY DEPT VISIT HI MDM: CPT

## 2020-07-06 PROCEDURE — 82550 ASSAY OF CK (CPK): CPT

## 2020-07-06 RX ORDER — SULFAMETHOXAZOLE AND TRIMETHOPRIM 800; 160 MG/1; MG/1
1 TABLET ORAL 2 TIMES DAILY
Qty: 20 TAB | Refills: 0 | Status: SHIPPED | OUTPATIENT
Start: 2020-07-06 | End: 2020-07-16

## 2020-07-06 RX ORDER — HYDROCODONE BITARTRATE AND ACETAMINOPHEN 5; 325 MG/1; MG/1
1 TABLET ORAL
Status: COMPLETED | OUTPATIENT
Start: 2020-07-06 | End: 2020-07-06

## 2020-07-06 RX ORDER — CEPHALEXIN 500 MG/1
500 CAPSULE ORAL 4 TIMES DAILY
Qty: 40 CAP | Refills: 0 | Status: SHIPPED | OUTPATIENT
Start: 2020-07-06 | End: 2020-07-16

## 2020-07-06 RX ORDER — TRAMADOL HYDROCHLORIDE 50 MG/1
50 TABLET ORAL
Qty: 12 TAB | Refills: 0 | Status: SHIPPED | OUTPATIENT
Start: 2020-07-06 | End: 2020-07-09

## 2020-07-06 RX ADMIN — HYDROCODONE BITARTRATE AND ACETAMINOPHEN 1 TABLET: 5; 325 TABLET ORAL at 14:37

## 2020-07-06 RX ADMIN — SODIUM CHLORIDE 1000 ML: 900 INJECTION, SOLUTION INTRAVENOUS at 11:07

## 2020-07-06 NOTE — ED PROVIDER NOTES
EMERGENCY DEPARTMENT HISTORY AND PHYSICAL EXAM    Date: 7/6/2020  Patient Name: Jacinto Kellogg    History of Presenting Illness     Chief Complaint   Patient presents with    Flank Pain       History Provided By: Patient     History Fauzia Crawley):   9:44 AM  Jacinto Kellogg is a 80 y.o. female with PMHX of Arthritis, asthma, chronic pain, GERD, hypertension, IBS, stroke who presents to the emergency department C/O right flank pain onset yesterday. Associated sxs include left foot pain. Pt denies fever, chills, nausea, vomiting or any other sxs or complaints. Patient complains of right flank pain since yesterday. She also has left foot pain. The right flank pain is radiating across her belly the left side again this morning. Patient states she has had a history of cholecystectomy in the past.    Chief Complaint: Right flank pain  Duration: 1 day  Timing: Acute  Location: Right flank radiating across the belly at the left  Quality: Sharp  Severity: Moderate  Modifying Factors: Nothing makes it better, or worse. Associated Symptoms: Left foot pain    PCP: Rickie Velazquez, DO     Current Outpatient Medications   Medication Sig Dispense Refill    trimethoprim-sulfamethoxazole (Bactrim DS) 160-800 mg per tablet Take 1 Tab by mouth two (2) times a day for 10 days. 20 Tab 0    cephALEXin (Keflex) 500 mg capsule Take 1 Cap by mouth four (4) times daily for 10 days. 40 Cap 0    traMADoL (Ultram) 50 mg tablet Take 1 Tab by mouth every six (6) hours as needed for Pain for up to 3 days. Max Daily Amount: 200 mg. 12 Tab 0    colchicine (COLCRYS) 0.6 mg tablet Take 0.6 mg by mouth as needed.  meclizine (ANTIVERT) 12.5 mg tablet Take 12.5 mg by mouth three (3) times daily as needed.  olmesartan-hydroCHLOROthiazide (BENICAR HCT) 20-12.5 mg per tablet Take 1 Tab by mouth daily.  folic acid/multivit-min/lutein (CENTRUM SILVER PO) Take  by mouth daily.       Dexlansoprazole (DEXILANT) 60 mg CpDB Take 60 mg by mouth as needed.  potassium chloride (KLOR-CON) 20 mEq packet Take 20 mEq by mouth daily.  dipyridamole-aspirin (AGGRENOX) 200-25 mg per SR capsule Take 1 Cap by mouth daily.  calcium carbonate (TUMS) 200 mg calcium (500 mg) Chew Take 1 Tab by mouth as needed.            Past History     Past Medical History:  Past Medical History:   Diagnosis Date    Arthritis     osteo    Asthma     inhaler as needed - every 2 to 3 years    Chronic pain     knee and hip ,shoulder     GERD (gastroesophageal reflux disease)     Hypertension     5yrs    Irritable bowel     Other ill-defined conditions(799.89)     h/o migraines    Other ill-defined conditions(799.89)     h/o dizzy    Other ill-defined conditions(799.89)     IBS; hiatal hernia    Psychiatric disorder     depression    Stroke ([facility] [address] 9 years ago     stroke - no deficits    Stroke ([facility] [address]     In 1997/98       Past Surgical History:  Past Surgical History:   Procedure Laterality Date    CARDIAC SURG PROCEDURE UNLIST      HX CHOLECYSTECTOMY      HX HERNIA REPAIR      umbilical twice    HX HYSTERECTOMY      HX INTRAOCULAR LENS PROSTHESIS      HX KNEE ARTHROSCOPY      left    HX KNEE REPLACEMENT Left 2013    HX OTHER SURGICAL      herniated disc surgery    HX TUBAL LIGATION      LAP UMBILICAL HERNIA REPAIR      LAP,CHOLECYSTECTOMY      NEUROLOGICAL PROCEDURE UNLISTED      lower back       Family History:  Family History   Problem Relation Age of Onset    Cancer Father     Malignant Hyperthermia Neg Hx     Pseudocholinesterase Deficiency Neg Hx     Post-op Nausea/Vomiting Neg Hx     Delayed Awakening Neg Hx     Emergence Delirium Neg Hx     Post-op Cognitive Dysfunction Neg Hx     Other Neg Hx        Social History:  Social History     Tobacco Use    Smoking status: Never Smoker    Smokeless tobacco: Never Used   Substance Use Topics    Alcohol use: No    Drug use: No       Allergies:  No Known Allergies      Review of Systems   Review of Systems   Constitutional: Negative for chills and fever. Respiratory: Negative for shortness of breath. Cardiovascular: Negative for chest pain. Gastrointestinal: Negative for abdominal pain, nausea and vomiting. Genitourinary: Positive for flank pain. Musculoskeletal: Positive for gait problem. Skin: Positive for color change. All other systems reviewed and are negative. Physical Exam     Vitals:    07/06/20 0925 07/06/20 1230 07/06/20 1330 07/06/20 1500   BP: 159/56 161/68 129/71 159/76   Pulse: 66      Resp: 18      Temp: 98.4 °F (36.9 °C)      SpO2: 97% 99% 99% 97%   Weight: 106.6 kg (235 lb)      Height: 5' 4\" (1.626 m)        Physical Exam  Vitals signs and nursing note reviewed. Vital signs and nursing notes reviewed  CONSTITUTIONAL: Alert, in no apparent distress; well-developed; well-nourished. HEAD:  Normocephalic, atraumatic  EYES: PERRL; EOM's intact. ENTM: Nose: no rhinorrhea; Throat: no erythema or exudate, mucous membranes moist  Neck:  No JVD, supple without lymphadenopathy  RESP: Chest clear, equal breath sounds. CV: S1 and S2 WNL; No murmurs, gallops or rubs. GI: Normal bowel sounds, mild abdominal pain without rebound or guarding, nonfocal clearly nonsurgical. No masses or organomegaly. : No costo-vertebral angle tenderness. BACK:  Non-tender  UPPER EXT:  Normal inspection  LOWER EXT: No edema, no calf tenderness. Distal pulses intact. Left foot has a lateral area 3 x 7 cm of erythema and tenderness to palpation  NEURO: CN intact, reflexes 2/4 and sym, strength 5/5 and sym, sensation intact. SKIN: No rashes; Normal for age and stage. Except for left foot as noted above. PSYCH:  Alert and oriented, normal affect.      Diagnostic Study Results     Labs -     Recent Results (from the past 12 hour(s))   CBC WITH AUTOMATED DIFF    Collection Time: 07/06/20 10:50 AM   Result Value Ref Range    WBC 12.1 4.6 - 13.2 K/uL    RBC 4.21 4.20 - 5.30 M/uL HGB 12.3 12.0 - 16.0 g/dL    HCT 38.9 35.0 - 45.0 %    MCV 92.4 74.0 - 97.0 FL    MCH 29.2 24.0 - 34.0 PG    MCHC 31.6 31.0 - 37.0 g/dL    RDW 14.0 11.6 - 14.5 %    PLATELET 983 907 - 945 K/uL    MPV 10.1 9.2 - 11.8 FL    NEUTROPHILS 70 40 - 73 %    LYMPHOCYTES 20 (L) 21 - 52 %    MONOCYTES 8 3 - 10 %    EOSINOPHILS 2 0 - 5 %    BASOPHILS 0 0 - 2 %    ABS. NEUTROPHILS 8.4 (H) 1.8 - 8.0 K/UL    ABS. LYMPHOCYTES 2.5 0.9 - 3.6 K/UL    ABS. MONOCYTES 1.0 0.05 - 1.2 K/UL    ABS. EOSINOPHILS 0.2 0.0 - 0.4 K/UL    ABS. BASOPHILS 0.0 0.0 - 0.1 K/UL    DF AUTOMATED     METABOLIC PANEL, COMPREHENSIVE    Collection Time: 07/06/20 10:50 AM   Result Value Ref Range    Sodium 140 136 - 145 mmol/L    Potassium 4.2 3.5 - 5.5 mmol/L    Chloride 106 100 - 111 mmol/L    CO2 28 21 - 32 mmol/L    Anion gap 6 3.0 - 18 mmol/L    Glucose 103 (H) 74 - 99 mg/dL    BUN 18 7.0 - 18 MG/DL    Creatinine 1.03 0.6 - 1.3 MG/DL    BUN/Creatinine ratio 17 12 - 20      GFR est AA >60 >60 ml/min/1.73m2    GFR est non-AA 51 (L) >60 ml/min/1.73m2    Calcium 8.8 8.5 - 10.1 MG/DL    Bilirubin, total 0.6 0.2 - 1.0 MG/DL    ALT (SGPT) 28 13 - 56 U/L    AST (SGOT) 30 10 - 38 U/L    Alk.  phosphatase 79 45 - 117 U/L    Protein, total 7.7 6.4 - 8.2 g/dL    Albumin 3.5 3.4 - 5.0 g/dL    Globulin 4.2 (H) 2.0 - 4.0 g/dL    A-G Ratio 0.8 0.8 - 1.7     LIPASE    Collection Time: 07/06/20 10:50 AM   Result Value Ref Range    Lipase 40 (L) 73 - 393 U/L   URINALYSIS W/ RFLX MICROSCOPIC    Collection Time: 07/06/20 10:50 AM   Result Value Ref Range    Color YELLOW      Appearance CLEAR      Specific gravity 1.022 1.005 - 1.030      pH (UA) 5.5 5.0 - 8.0      Protein Negative NEG mg/dL    Glucose Negative NEG mg/dL    Ketone Negative NEG mg/dL    Bilirubin Negative NEG      Blood Negative NEG      Urobilinogen 0.2 0.2 - 1.0 EU/dL    Nitrites Negative NEG      Leukocyte Esterase Negative NEG     CARDIAC PANEL,(CK, CKMB & TROPONIN)    Collection Time: 07/06/20 10:50 AM Result Value Ref Range    CK - MB 1.0 <3.6 ng/ml    CK-MB Index 1.0 0.0 - 4.0 %     26 - 192 U/L    Troponin-I, QT 0.03 0.0 - 0.045 NG/ML   MAGNESIUM    Collection Time: 07/06/20 10:50 AM   Result Value Ref Range    Magnesium 1.8 1.6 - 2.6 mg/dL   EKG, 12 LEAD, INITIAL    Collection Time: 07/06/20 11:23 AM   Result Value Ref Range    Ventricular Rate 52 BPM    Atrial Rate 52 BPM    P-R Interval 174 ms    QRS Duration 88 ms    Q-T Interval 452 ms    QTC Calculation (Bezet) 420 ms    Calculated P Axis 50 degrees    Calculated R Axis -9 degrees    Calculated T Axis 18 degrees    Diagnosis       Sinus bradycardia  Voltage criteria for left ventricular hypertrophy  Abnormal ECG  When compared with ECG of 10-DEC-2018 17:13,  No significant change was found  Confirmed by Valentino Northern, MD. ([id number]) on 7/6/2020 12:03:43 PM         Radiologic Studies -   CT ABD PELV WO CONT   Final Result   IMPRESSION:      1. No nephrolithiasis or evidence of an obstructive uropathy. No acute   abnormality otherwise to explain patient's right flank pain. Normal appendix. 2. Multiple subcentimeter pulmonary nodules are seen within both visualized lung   bases. All of these remain unchanged compared to prior CT of 2014, greater than   two-year stability implying an underlying benign etiology, probably   postinfectious or postinflammatory, and warranting no additional dedicated   follow-up. XR FOOT LT MIN 3 V   Final Result   IMPRESSION:      No evidence of acute fracture or dislocation. XR CHEST PORT   Final Result   IMPRESSION:      No acute cardiopulmonary abnormality. CT Results  (Last 48 hours)               07/06/20 1307  CT ABD PELV WO CONT Final result    Impression:  IMPRESSION:       1. No nephrolithiasis or evidence of an obstructive uropathy. No acute   abnormality otherwise to explain patient's right flank pain. Normal appendix.    2. Multiple subcentimeter pulmonary nodules are seen within both visualized lung   bases. All of these remain unchanged compared to prior CT of 2014, greater than   two-year stability implying an underlying benign etiology, probably   postinfectious or postinflammatory, and warranting no additional dedicated   follow-up. Narrative:  EXAM: CT ABD PELV WO CONT       CLINICAL INDICATION/HISTORY: Right flank pain for the past 3 days       COMPARISON: 6/12/2014       TECHNIQUE:   CT of the abdomen and pelvis without intravenous contrast   administration, stone protocol. Coronal and sagittal reformats were generated   and reviewed. One or more dose reduction techniques were used on this CT:   automated exposure control, adjustment of the mAs and/or kVp according to   patient size, and iterative reconstruction techniques. The specific techniques   used on this CT exam have been documented in the patient's electronic medical   record. Digital Imaging and Communications in Medicine (DICOM) format image   data are available to nonaffiliated external healthcare facilities or entities   on a secure, media free, reciprocally searchable basis with patient   authorization for at least a 12-month period after this study. _______________       FINDINGS:       LOWER THORAX: Several pulmonary nodules are seen in the right lower lobe,   ranging 6-9 mm (images 4, 6, 8, 13, and 15). Three 3 mm nodules are seen within   the inferior aspect of the right middle lobe (axial images 6, 8, and 10). Left   lower lobe pulmonary nodules measuring 5 mm, 3 mm, and 10 mm are seen in the   left lower lobe (images 4, 2, and 14). No acute pulmonary infiltrate. No   pleural effusion. No global cardiomegaly or pericardial effusion. RIGHT KIDNEY: No renal calculi or hydronephrosis identified. The right ureter   is nondilated and there is no evidence of ureteral calculi. LEFT KIDNEY: No renal calculi or hydronephrosis identified.   The left ureter is   nondilated and there is no evidence of ureteral calculi. LIVER AND BILIARY:  No suspicious liver lesions on this unenhanced CT. No   biliary dilation. Right upper quadrant surgical clips of prior cholecystectomy. SPLEEN:  Normal.       PANCREAS:  Normal.       ADRENALS:  Normal.       LYMPH NODES:  No mesenteric or retroperitoneal lymphadenopathy. GI TRACT:  Small hiatal hernia. Normal caliber small and large bowel loops. No   morphology of bowel obstruction. No bowel wall thickening. Normal appendix. PELVIC ORGANS:  Bladder is normal in appearance. No acute abnormality. Small,   fat filled left inguinal hernia. VASCULATURE:  Normal caliber lower thoracic and abdominal aorta with mild   atherosclerotic vascular calcification. OTHER:   No ascites or free intraperitoneal air. OSSEOUS STRUCTURES:  No acute osseous abnormality. Surgical changes and hardware   of previous left hip arthroplasty. L3-L4 and L4-L5 anterolisthesis related to   advanced facet arthrosis. No pars interarticularis defect. Moderately severe   multilevel degenerative disk disease and facet arthropathy       _______________               CXR Results  (Last 48 hours)               07/06/20 1007  XR CHEST PORT Final result    Impression:  IMPRESSION:       No acute cardiopulmonary abnormality. Narrative:  EXAM: XR CHEST PORT       CLINICAL INDICATION/HISTORY: chest pain   -Additional: None       COMPARISON: 12/10/2018       TECHNIQUE: Portable frontal view of the chest       _______________       FINDINGS:       SUPPORT DEVICES: None. HEART AND MEDIASTINUM: Cardiomediastinal silhouette within normal limits. LUNGS AND PLEURAL SPACES: Hypoinflated lungs.  No dense consolidation, large   effusion or pneumothorax.       _______________                 Medications given in the ED-  Medications   sodium chloride 0.9 % bolus infusion 1,000 mL (0 mL IntraVENous IV Completed 7/6/20 6158) HYDROcodone-acetaminophen (NORCO) 5-325 mg per tablet 1 Tab (1 Tab Oral Given 7/6/20 0245)         Medical Decision Making   I am the first provider for this patient. I reviewed the vital signs, available nursing notes, past medical history, past surgical history, family history and social history. Vital Signs-Reviewed the patient's vital signs. Pulse Oximetry Analysis -97 % on room air    Cardiac Monitor:  Rate: 66 bpm  Rhythm: NSR    EKG interpretation: (Preliminary)  Rhythm: sinus bradycardia. Rate: 52 bpm; no STEMI  EKG read by Tova Hall MD at 11:23 AM     Records Reviewed: Nursing Notes    Provider Notes (Medical Decision Making):   DDX: Kidney stone, cholecystitis, pancreatitis, peptic ulcer disease, cellulitis    Procedures:  Procedures    ED Course:   9:44 AM Initial assessment performed. The patients presenting problems have been discussed, and they are in agreement with the care plan formulated and outlined with them. I have encouraged them to ask questions as they arise throughout their visit. 9:58 AM while patient was getting her left foot x-ray, she told the x-ray tech that she was having chest pain. Chest x-ray and cardiac panel was added as well as an EKG. Diagnosis and Disposition     Discussion:  80 y.o. female with right flank pain. Patient additionally has cellulitic left foot. EKG and cardiac panel were normal in the ED. Chest x-ray was unremarkable. Patient did not demonstrate any osteomyelitis on the left foot. Will treat left foot cellulitis as an outpatient. Patient may follow-up with her primary care doctor. Patient understands and agrees this plan. DISCHARGE NOTE:  2:56 PM   Manuel Reina's  results have been reviewed with her. She has been counseled regarding her diagnosis, treatment, and plan.   She verbally conveys understanding and agreement of the signs, symptoms, diagnosis, treatment and prognosis and additionally agrees to follow up as discussed. She also agrees with the care-plan and conveys that all of her questions have been answered. I have also provided discharge instructions for her that include: educational information regarding their diagnosis and treatment, and list of reasons why they would want to return to the ED prior to their follow-up appointment, should her condition change. She has been provided with education for proper emergency department utilization. CLINICAL IMPRESSION:    1. Right flank pain    2. Cellulitis of left foot        PLAN:  1. D/C Home  2. Discharge Medication List as of 7/6/2020  3:15 PM      START taking these medications    Details   trimethoprim-sulfamethoxazole (Bactrim DS) 160-800 mg per tablet Take 1 Tab by mouth two (2) times a day for 10 days. , Normal, Disp-20 Tab, R-0      cephALEXin (Keflex) 500 mg capsule Take 1 Cap by mouth four (4) times daily for 10 days. , Normal, Disp-40 Cap, R-0      traMADoL (Ultram) 50 mg tablet Take 1 Tab by mouth every six (6) hours as needed for Pain for up to 3 days. Max Daily Amount: 200 mg., Normal, Disp-12 Tab, R-0         CONTINUE these medications which have NOT CHANGED    Details   colchicine (COLCRYS) 0.6 mg tablet Take 0.6 mg by mouth as needed., Historical Med      meclizine (ANTIVERT) 12.5 mg tablet Take 12.5 mg by mouth three (3) times daily as needed., Historical Med      olmesartan-hydroCHLOROthiazide (BENICAR HCT) 20-12.5 mg per tablet Take 1 Tab by mouth daily. , Historical Med      folic acid/multivit-min/lutein (CENTRUM SILVER PO) Take  by mouth daily. , Historical Med      Dexlansoprazole (DEXILANT) 60 mg CpDB Take 60 mg by mouth as needed., Historical Med      potassium chloride (KLOR-CON) 20 mEq packet Take 20 mEq by mouth daily. , Historical Med      dipyridamole-aspirin (AGGRENOX) 200-25 mg per SR capsule Take 1 Cap by mouth daily. , Historical Med      calcium carbonate (TUMS) 200 mg calcium (500 mg) Chew Take 1 Tab by mouth as needed.   , Historical Med           3. Follow-up Information     Follow up With Specialties Details Why [id number] [facility], [address], DO Internal Medicine Schedule an appointment as soon as possible for a visit in 2 days For follow up from Emergency Department visit. Kitty Morales      [facility] EMERGENCY DEPT Emergency Medicine  As needed; If symptoms worsen [address] [place] [id number] [address] [place]     Please note that this dictation was completed with [email or web address], the computer voice recognition software. Quite often unanticipated grammatical, syntax, homophones, and other interpretive errors are inadvertently transcribed by the computer software. Please disregard these errors. Please excuse any errors that have escaped final proofreading.     Tegan Crenshaw MD

## 2020-07-06 NOTE — DISCHARGE INSTRUCTIONS
Patient Education        Cellulitis: Care Instructions  Your Care Instructions     Cellulitis is a skin infection caused by bacteria, most often strep or staph. It often occurs after a break in the skin from a scrape, cut, bite, or puncture, or after a rash. Cellulitis may be treated without doing tests to find out what caused it. But your doctor may do tests, if needed, to look for a specific bacteria, like methicillin-resistant Staphylococcus aureus (MRSA). The doctor has checked you carefully, but problems can develop later. If you notice any problems or new symptoms, get medical treatment right away. Follow-up care is a key part of your treatment and safety. Be sure to make and go to all appointments, and call your doctor if you are having problems. It's also a good idea to know your test results and keep a list of the medicines you take. How can you care for yourself at home? · Take your antibiotics as directed. Do not stop taking them just because you feel better. You need to take the full course of antibiotics. · Prop up the infected area on pillows to reduce pain and swelling. Try to keep the area above the level of your heart as often as you can. · If your doctor told you how to care for your wound, follow your doctor's instructions. If you did not get instructions, follow this general advice:  ? Wash the wound with clean water 2 times a day. Don't use hydrogen peroxide or alcohol, which can slow healing. ? You may cover the wound with a thin layer of petroleum jelly, such as Vaseline, and a nonstick bandage. ? Apply more petroleum jelly and replace the bandage as needed. · Be safe with medicines. Take pain medicines exactly as directed. ? If the doctor gave you a prescription medicine for pain, take it as prescribed. ? If you are not taking a prescription pain medicine, ask your doctor if you can take an over-the-counter medicine.   To prevent cellulitis in the future  · Try to prevent cuts, scrapes, or other injuries to your skin. Cellulitis most often occurs where there is a break in the skin. · If you get a scrape, cut, mild burn, or bite, wash the wound with clean water as soon as you can to help avoid infection. Don't use hydrogen peroxide or alcohol, which can slow healing. · If you have swelling in your legs (edema), support stockings and good skin care may help prevent leg sores and cellulitis. · Take care of your feet, especially if you have diabetes or other conditions that increase the risk of infection. Wear shoes and socks. Do not go barefoot. If you have athlete's foot or other skin problems on your feet, talk to your doctor about how to treat them. When should you call for help? Call your doctor now or seek immediate medical care if:  · You have signs that your infection is getting worse, such as:  ? Increased pain, swelling, warmth, or redness. ? Red streaks leading from the area. ? Pus draining from the area. ? A fever. · You get a rash. Watch closely for changes in your health, and be sure to contact your doctor if:  · You do not get better as expected. Where can you learn more? Go to http://shell-david.info/  Enter X309 in the search box to learn more about \"Cellulitis: Care Instructions. \"  Current as of: October 31, 2019               Content Version: 12.5  © 4788-3571 Healthwise, Incorporated. Care instructions adapted under license by [employer] (which disclaims liability or warranty for this information). If you have questions about a medical condition or this instruction, always ask your healthcare professional. [place] [address] any warranty or liability for your use of this information. Patient Education        Abdominal Pain: Care Instructions  Your Care Instructions     Abdominal pain has many possible causes. Some aren't serious and get better on their own in a few days.  Others need more testing and treatment. If your pain continues or gets worse, you need to be rechecked and may need more tests to find out what is wrong. You may need surgery to correct the problem. Don't ignore new symptoms, such as fever, nausea and vomiting, urination problems, pain that gets worse, and dizziness. These may be signs of a more serious problem. Your doctor may have recommended a follow-up visit in the next 8 to 12 hours. If you are not getting better, you may need more tests or treatment. The doctor has checked you carefully, but problems can develop later. If you notice any problems or new symptoms, get medical treatment right away. Follow-up care is a key part of your treatment and safety. Be sure to make and go to all appointments, and call your doctor if you are having problems. It's also a good idea to know your test results and keep a list of the medicines you take. How can you care for yourself at home? · Rest until you feel better. · To prevent dehydration, drink plenty of fluids, enough so that your urine is light yellow or clear like water. Choose water and other caffeine-free clear liquids until you feel better. If you have kidney, heart, or liver disease and have to limit fluids, talk with your doctor before you increase the amount of fluids you drink. · If your stomach is upset, eat mild foods, such as rice, dry toast or crackers, bananas, and applesauce. Try eating several small meals instead of two or three large ones. · Wait until 48 hours after all symptoms have gone away before you have spicy foods, alcohol, and drinks that contain caffeine. · Do not eat foods that are high in fat. · Avoid anti-inflammatory medicines such as aspirin, ibuprofen (Advil, Motrin), and naproxen (Aleve). These can cause stomach upset. Talk to your doctor if you take daily aspirin for another health problem. When should you call for help? [id number] anytime you think you may need emergency care.  For example, call if:  · You passed out (lost consciousness). · You pass maroon or very bloody stools. · You vomit blood or what looks like coffee grounds. · You have new, severe belly pain. Call your doctor now or seek immediate medical care if:  · Your pain gets worse, especially if it becomes focused in one area of your belly. · You have a new or higher fever. · Your stools are black and look like tar, or they have streaks of blood. · You have unexpected vaginal bleeding. · You have symptoms of a urinary tract infection. These may include:  ? Pain when you urinate. ? Urinating more often than usual.  ? Blood in your urine. · You are dizzy or lightheaded, or you feel like you may faint. Watch closely for changes in your health, and be sure to contact your doctor if:  · You are not getting better after 1 day (24 hours). Where can you learn more? Go to http://www.gray.com/  Enter B230 in the search box to learn more about \"Abdominal Pain: Care Instructions. \"  Current as of: June 26, 2019               Content Version: 12.5  © 2467-5924 Healthwise, Incorporated. Care instructions adapted under license by [employer] (which disclaims liability or warranty for this information). If you have questions about a medical condition or this instruction, always ask your healthcare professional. [address] any warranty or liability for your use of this information.

## 2020-07-07 ENCOUNTER — TELEPHONE (OUTPATIENT)
Dept: CASE MANAGEMENT | Age: 85
End: 2020-07-07

## 2020-07-07 NOTE — TELEPHONE ENCOUNTER
Date/Time:  7/7/2020 10:15 AM  Attempted to reach patient by telephone. Left HIPPA compliant message requesting a return call. Will attempt to reach patient again.

## 2020-07-08 NOTE — TELEPHONE ENCOUNTER
Date/Time:  7/8/2020 1:27 PM  Attempted to reach patient by telephone. Left HIPPA compliant message requesting a return call.

## 2020-07-09 NOTE — TELEPHONE ENCOUNTER
Patient resolved from Transition of Care episode on 7/9/2020    Called patient twice requesting a return call patient never returned my call.

## 2021-05-19 ENCOUNTER — APPOINTMENT (OUTPATIENT)
Dept: GENERAL RADIOLOGY | Age: 86
End: 2021-05-19
Attending: EMERGENCY MEDICINE
Payer: MEDICARE

## 2021-05-19 ENCOUNTER — APPOINTMENT (OUTPATIENT)
Dept: VASCULAR SURGERY | Age: 86
End: 2021-05-19
Attending: EMERGENCY MEDICINE
Payer: MEDICARE

## 2021-05-19 ENCOUNTER — HOSPITAL ENCOUNTER (EMERGENCY)
Age: 86
Discharge: HOME | End: 2021-05-19
Attending: EMERGENCY MEDICINE
Payer: MEDICARE

## 2021-05-19 VITALS
WEIGHT: 238 LBS | SYSTOLIC BLOOD PRESSURE: 129 MMHG | BODY MASS INDEX: 40.85 KG/M2 | HEART RATE: 41 BPM | RESPIRATION RATE: 16 BRPM | TEMPERATURE: 97.3 F | DIASTOLIC BLOOD PRESSURE: 74 MMHG | OXYGEN SATURATION: 99 %

## 2021-05-19 DIAGNOSIS — M79.89 FOOT SWELLING: Primary | ICD-10-CM

## 2021-05-19 DIAGNOSIS — L03.032 CELLULITIS OF TOE OF LEFT FOOT: ICD-10-CM

## 2021-05-19 LAB
ALBUMIN SERPL-MCNC: 3.5 G/DL (ref 3.4–5)
ALBUMIN/GLOB SERPL: 0.8 {RATIO} (ref 0.8–1.7)
ALP SERPL-CCNC: 70 U/L (ref 45–117)
ALT SERPL-CCNC: 29 U/L (ref 13–56)
ANION GAP SERPL CALC-SCNC: 5 MMOL/L (ref 3–18)
APTT PPP: 31 SEC (ref 23–36.4)
AST SERPL-CCNC: 26 U/L (ref 10–38)
BASOPHILS # BLD: 0.1 K/UL (ref 0–0.1)
BASOPHILS NFR BLD: 1 % (ref 0–2)
BILIRUB SERPL-MCNC: 0.6 MG/DL (ref 0.2–1)
BUN SERPL-MCNC: 19 MG/DL (ref 7–18)
BUN/CREAT SERPL: 18 (ref 12–20)
CALCIUM SERPL-MCNC: 8.9 MG/DL (ref 8.5–10.1)
CHLORIDE SERPL-SCNC: 110 MMOL/L (ref 100–111)
CO2 SERPL-SCNC: 26 MMOL/L (ref 21–32)
CREAT SERPL-MCNC: 1.05 MG/DL (ref 0.6–1.3)
DIFFERENTIAL METHOD BLD: ABNORMAL
EOSINOPHIL # BLD: 0.2 K/UL (ref 0–0.4)
EOSINOPHIL NFR BLD: 2 % (ref 0–5)
ERYTHROCYTE [DISTWIDTH] IN BLOOD BY AUTOMATED COUNT: 14.5 % (ref 11.6–14.5)
GLOBULIN SER CALC-MCNC: 4.4 G/DL (ref 2–4)
GLUCOSE SERPL-MCNC: 84 MG/DL (ref 74–99)
HCT VFR BLD AUTO: 36 % (ref 35–45)
HGB BLD-MCNC: 11.5 G/DL (ref 12–16)
INR PPP: 1.1 (ref 0.8–1.2)
LYMPHOCYTES # BLD: 1.7 K/UL (ref 0.9–3.6)
LYMPHOCYTES NFR BLD: 14 % (ref 21–52)
MCH RBC QN AUTO: 31 PG (ref 24–34)
MCHC RBC AUTO-ENTMCNC: 31.9 G/DL (ref 31–37)
MCV RBC AUTO: 97 FL (ref 74–97)
MONOCYTES # BLD: 1.1 K/UL (ref 0.05–1.2)
MONOCYTES NFR BLD: 9 % (ref 3–10)
NEUTS SEG # BLD: 9.4 K/UL (ref 1.8–8)
NEUTS SEG NFR BLD: 75 % (ref 40–73)
PLATELET # BLD AUTO: 275 K/UL (ref 135–420)
PMV BLD AUTO: 10.9 FL (ref 9.2–11.8)
POTASSIUM SERPL-SCNC: 4.3 MMOL/L (ref 3.5–5.5)
PROT SERPL-MCNC: 7.9 G/DL (ref 6.4–8.2)
PROTHROMBIN TIME: 13.6 SEC (ref 11.5–15.2)
RBC # BLD AUTO: 3.71 M/UL (ref 4.2–5.3)
SODIUM SERPL-SCNC: 141 MMOL/L (ref 136–145)
WBC # BLD AUTO: 12.5 K/UL (ref 4.6–13.2)

## 2021-05-19 PROCEDURE — 85025 COMPLETE CBC W/AUTO DIFF WBC: CPT

## 2021-05-19 PROCEDURE — 80053 COMPREHEN METABOLIC PANEL: CPT

## 2021-05-19 PROCEDURE — 74011250637 HC RX REV CODE- 250/637: Performed by: EMERGENCY MEDICINE

## 2021-05-19 PROCEDURE — 93971 EXTREMITY STUDY: CPT

## 2021-05-19 PROCEDURE — 73502 X-RAY EXAM HIP UNI 2-3 VIEWS: CPT

## 2021-05-19 PROCEDURE — 99284 EMERGENCY DEPT VISIT MOD MDM: CPT

## 2021-05-19 PROCEDURE — 99281 EMR DPT VST MAYX REQ PHY/QHP: CPT

## 2021-05-19 PROCEDURE — 85610 PROTHROMBIN TIME: CPT

## 2021-05-19 PROCEDURE — 85730 THROMBOPLASTIN TIME PARTIAL: CPT

## 2021-05-19 PROCEDURE — 73630 X-RAY EXAM OF FOOT: CPT

## 2021-05-19 RX ORDER — LEVOFLOXACIN 500 MG/1
500 TABLET, FILM COATED ORAL DAILY
Qty: 10 TABLET | Refills: 0 | Status: ON HOLD | OUTPATIENT
Start: 2021-05-19 | End: 2021-05-26 | Stop reason: SDUPTHER

## 2021-05-19 RX ORDER — LEVOFLOXACIN 500 MG/1
500 TABLET, FILM COATED ORAL
Status: DISCONTINUED | OUTPATIENT
Start: 2021-05-19 | End: 2021-05-20 | Stop reason: HOSPADM

## 2021-05-19 RX ORDER — OXYCODONE AND ACETAMINOPHEN 5; 325 MG/1; MG/1
1 TABLET ORAL
Qty: 20 TABLET | Refills: 0 | Status: SHIPPED | OUTPATIENT
Start: 2021-05-19 | End: 2021-05-26

## 2021-05-19 RX ORDER — OXYCODONE AND ACETAMINOPHEN 5; 325 MG/1; MG/1
1 TABLET ORAL
Status: COMPLETED | OUTPATIENT
Start: 2021-05-19 | End: 2021-05-19

## 2021-05-19 RX ORDER — ONDANSETRON 4 MG/1
4 TABLET, ORALLY DISINTEGRATING ORAL
Status: COMPLETED | OUTPATIENT
Start: 2021-05-19 | End: 2021-05-19

## 2021-05-19 RX ADMIN — ONDANSETRON 4 MG: 4 TABLET, ORALLY DISINTEGRATING ORAL at 21:05

## 2021-05-19 RX ADMIN — OXYCODONE HYDROCHLORIDE AND ACETAMINOPHEN 1 TABLET: 5; 325 TABLET ORAL at 21:04

## 2021-05-19 NOTE — ED NOTES
Dr. Alia Carrizales aware that patient unable to get labs or IV access.  She asked to call RRT spoke with azeem germain

## 2021-05-19 NOTE — ED PROVIDER NOTES
EMERGENCY DEPARTMENT HISTORY AND PHYSICAL EXAM    Date: 5/19/2021  Patient Name: Megan Light    History of Presenting Illness     Chief Complaint   Patient presents with    Side Pain    Leg Swelling         History Provided By: Patient and Patient's Daughter    5:39 PM  Megan Light is a 80 y.o. female with PMHX of CVA, hypertension, obesity who presents to the emergency department C/O left leg pain and left side pain. Patient reports the symptoms started Monday. Pain is worse with movement or bearing weight. Denies any falls or injuries. Reports the left leg is swollen. Reports she was recently started on a new blood thinner called Aggrenox. Denies any fever, chest pain, shortness of breath, nausea, vomiting, abdominal pain, other complaints. No known sick contacts or recent travel.      PCP: Concha Brunner DO    Facility-Administered Medications Ordered in Other Encounters   Medication Dose Route Frequency Provider Last Rate Last Admin    atenoloL (TENORMIN) tablet 100 mg  100 mg Oral DAILY Adalgisa Pham MD   100 mg at 05/23/21 1358    docusate sodium (COLACE) capsule 100 mg  100 mg Oral BID Adalgisa Pham MD        polyethylene glycol [facility]) packet 17 g  17 g Oral BID Adalgisa Pham MD        potassium chloride (K-DUR, KLOR-CON) SR tablet 40 mEq  40 mEq Oral NOW Adalgisa Pham MD        colchicine tablet 0.6 mg  0.6 mg Oral DAILY Adalgisa Pham MD   0.6 mg at 05/23/21 3413    aspirin chewable tablet 81 mg  81 mg Oral DAILY Adalgisa Pham MD   81 mg at 05/23/21 0934    nitroglycerin (NITROSTAT) tablet 0.4 mg  0.4 mg SubLINGual Q5MIN PRN Adalgisa Pham MD        acetaminophen (TYLENOL) tablet 650 mg  650 mg Oral Q4H PRN Adalgisa Pham MD        morphine injection 1 mg  1 mg IntraVENous Q4H PRN Adalgisa Pham MD        heparin (porcine) injection 5,000 Units  5,000 Units SubCUTAneous Q8H Adalgisa Pham MD   5,000 Units at 05/23/21 1219    oxyCODONE-acetaminophen (PERCOCET) 5-325 mg per tablet 1 Tablet  1 Tablet Oral Q4H PRN Adalgisa Pham MD   1 Tablet at 05/22/21 2109    pantoprazole (PROTONIX) tablet 40 mg  40 mg Oral ACB Anthony Oconnell MD   40 mg at 05/23/21 9858    cefTRIAXone (ROCEPHIN) 1 g in sterile water (preservative free) 10 mL IV syringe  1 g IntraVENous Q24H Anthony Oconnell MD   1 g at 05/23/21 1358    Saccharomyces boulardii (FLORASTOR) capsule 500 mg  500 mg Oral BID Anthony Oconnell MD   500 mg at 05/23/21 7017    Vancomycin-Pharmacy to Dose  1 Each Other Rx Dosing/Monitoring Anthony Oconnell MD        vancomycin (VANCOCIN) 1,000 mg in 0.9% sodium chloride 250 mL (VIAL-MATE)  1,000 mg IntraVENous Q24H Anthony Oconnell MD   1,000 mg at 05/22/21 1642    albuterol-ipratropium (DUO-NEB) 2.5 MG-0.5 MG/3 ML  3 mL Nebulization QID RT Annette HURST MD   3 mL at 05/22/21 2104       Past History     Past Medical History:  Past Medical History:   Diagnosis Date    Arthritis     osteo    Asthma     inhaler as needed - every 2 to 3 years    Chronic pain     knee and hip ,shoulder     GERD (gastroesophageal reflux disease)     Hypertension     5yrs    Irritable bowel     Other ill-defined conditions(799.89)     h/o migraines    Other ill-defined conditions(799.89)     h/o dizzy    Other ill-defined conditions(799.89)     IBS; hiatal hernia    Psychiatric disorder     depression    Stroke ([facility] [address] 9 years ago     stroke - no deficits    Stroke ([facility] [address]     In 1997/98       Past Surgical History:  Past Surgical History:   Procedure Laterality Date    HX CHOLECYSTECTOMY      HX HERNIA REPAIR      umbilical twice    HX HYSTERECTOMY      HX INTRAOCULAR LENS PROSTHESIS      HX KNEE ARTHROSCOPY      left    HX KNEE REPLACEMENT Left 2013    HX OTHER SURGICAL      herniated disc surgery    HX TUBAL LIGATION      LAP UMBILICAL HERNIA REPAIR      NEUROLOGICAL PROCEDURE UNLISTED      lower back    OH CARDIAC SURG PROCEDURE UNLIST      [place] LAP,CHOLECYSTECTOMY         Family History:  Family History   Problem Relation Age of Onset    Cancer Father     Malignant Hyperthermia Neg Hx     Pseudocholinesterase Deficiency Neg Hx     Post-op Nausea/Vomiting Neg Hx     Delayed Awakening Neg Hx     Emergence Delirium Neg Hx     Post-op Cognitive Dysfunction Neg Hx     Other Neg Hx        Social History:  Social History     Tobacco Use    Smoking status: Never Smoker    Smokeless tobacco: Never Used   Substance Use Topics    Alcohol use: No    Drug use: No       Allergies:  No Known Allergies      Review of Systems   Review of Systems   Constitutional: Negative for fever. Respiratory: Negative for shortness of breath. Cardiovascular: Positive for leg swelling. Negative for chest pain. Gastrointestinal: Negative for abdominal pain. Musculoskeletal: Positive for arthralgias, back pain and myalgias. All other systems reviewed and are negative.         Physical Exam     Vitals:    05/19/21 1936 05/19/21 1945 05/19/21 2047 05/19/21 2100   BP:  116/73 113/87 129/74   Pulse:       Resp:       Temp:       SpO2: 100% 95% 97% 99%   Weight:         Physical Exam    Nursing notes and vital signs reviewed    Constitutional: Non toxic appearing, obese, moderate distress  Head: Normocephalic, Atraumatic  Eyes: EOMI  Neck: Supple  Cardiovascular: Bradycardic and regular rhythm, no murmurs, rubs, or gallops  Chest: Normal work of breathing and chest excursion bilaterally  Lungs: Clear to ausculation bilaterally  Abdomen: Soft, non tender, non distended  Back: No evidence of trauma or deformity  Extremities: Diffusely tender over left hip, left lower flank, left leg, left lower extremity is swollen when compared to the right, distal neurovascular intact though patient does have pain with range of motion of this extremity  Skin: Warm and dry, normal cap refill  Neuro: Alert and appropriate  Psychiatric: Normal mood and affect      Diagnostic Study Results     Labs -     Recent Results (from the past 12 hour(s))   GLUCOSE, POC    Collection Time: 05/23/21  6:15 AM   Result Value Ref Range Glucose (POC) 102 70 - 110 mg/dL       Radiologic Studies -   XR FOOT LT MIN 3 V   Final Result      1. Degenerative calcaneal spurring without evidence of a significant acute   osseous abnormality of the left foot. This report has been generated using voice recognition software. XR HIP LT W OR WO PELV 2-3 VWS   Final Result      1. No obvious complicating features of the left hip prosthesis. No acute osseous   abnormality. This report has been generated using voice recognition software. CT Results  (Last 48 hours)    None        CXR Results  (Last 48 hours)    None          Medications given in the ED-  Medications   oxyCODONE-acetaminophen (PERCOCET) 5-325 mg per tablet 1 Tablet (1 Tablet Oral Given 5/19/21 2104)   ondansetron (ZOFRAN ODT) tablet 4 mg (4 mg Oral Given 5/19/21 2105)         Medical Decision Making   I am the first provider for this patient. I reviewed the vital signs, available nursing notes, past medical history, past surgical history, family history and social history. Vital Signs-Reviewed the patient's vital signs. Pulse Oximetry Analysis - 100% on room air, not hypoxic     Records Reviewed: Nursing Notes and Old Medical Records    Provider Notes (Medical Decision Making): Ric Grant is a 80 y.o. female presenting for left leg pain and swelling. No DVT on Doppler. X-ray of pelvis without acute process. On review of patient's record she has had multiple presentations for similar complaints on exam.  Labs pending at time of signout. If negative anticipate discharge with primary care follow-up and return precautions. Procedures:  Procedures    ED Course:   7:14 PM   Patient care will be transferred to Dr. Evelio Velasco from Dr. Tricia Miles.  Discussed available diagnostic results and care plan at length. Pt made aware of provider change. Patient feels and reasonably well after treatment stay emergency department.   White count is within normal limits although at the high end with somewhat a left shift. Blood work shows mild evidence of dehydration but not much. Coag markers were within normal limits. Imaging of the foot and hip shows some chronic changes but nothing acute. Will order antibiotics for the possibility of early infection and allow early outpatient follow-up. Diagnosis and Disposition     Critical Care: 0 minutes    DISCHARGE NOTE:    Marilia Reina's  results have been reviewed with her. She has been counseled regarding her diagnosis, treatment, and plan. She verbally conveys understanding and agreement of the signs, symptoms, diagnosis, treatment and prognosis and additionally agrees to follow up as discussed. She also agrees with the care-plan and conveys that all of her questions have been answered. I have also provided discharge instructions for her that include: educational information regarding their diagnosis and treatment, and list of reasons why they would want to return to the ED prior to their follow-up appointment, should her condition change. She has been provided with education for proper emergency department utilization. CLINICAL IMPRESSION:    1. Foot swelling    2. Cellulitis of toe of left foot        PLAN:  1. D/C Home  2. Discharge Medication List as of 5/19/2021 10:29 PM        3.    Follow-up Information     Follow up With Specialties Details Why Contact Info Additional Information    Blake Almanzar, DO Internal Medicine In 1 week  [place] [address]  [phone]       [facility] EMERGENCY DEPT Emergency Medicine  As needed [address] [place] [address] [address] [place] [address]     [address] In 1 week  [address] [place] [id number] [phone] Patients scheduled for OP Wound Care visits should enter the [facility], 2nd floor, [address] for check in.        _______________________________      Please note that this dictation was completed with [employer], the [employer] voice recognition software. Quite often unanticipated grammatical, syntax, homophones, and other interpretive errors are inadvertently transcribed by the computer software. Please disregard these errors. Please excuse any errors that have escaped final proofreading.

## 2021-05-19 NOTE — ED TRIAGE NOTES
Patient reports LEFT side pain with LEFT leg swelling noted Monday.  Unable to bear weight on LEFT leg

## 2021-05-20 NOTE — ED NOTES
Pt told where to  prescribed medications. Pt given verbal and written d/c instructions. Pt taken to car per w/c by her daughter.

## 2021-05-21 ENCOUNTER — APPOINTMENT (OUTPATIENT)
Dept: CT IMAGING | Age: 86
End: 2021-05-21
Attending: EMERGENCY MEDICINE
Payer: MEDICARE

## 2021-05-21 ENCOUNTER — HOSPITAL ENCOUNTER (OUTPATIENT)
Age: 86
Setting detail: OBSERVATION
Discharge: HOME | End: 2021-05-26
Attending: EMERGENCY MEDICINE | Admitting: HOSPITALIST
Payer: MEDICARE

## 2021-05-21 ENCOUNTER — APPOINTMENT (OUTPATIENT)
Dept: GENERAL RADIOLOGY | Age: 86
End: 2021-05-21
Attending: HOSPITALIST
Payer: MEDICARE

## 2021-05-21 ENCOUNTER — APPOINTMENT (OUTPATIENT)
Dept: GENERAL RADIOLOGY | Age: 86
End: 2021-05-21
Attending: EMERGENCY MEDICINE
Payer: MEDICARE

## 2021-05-21 DIAGNOSIS — R00.1 BRADYCARDIA: ICD-10-CM

## 2021-05-21 DIAGNOSIS — R91.1 LUNG NODULE: ICD-10-CM

## 2021-05-21 DIAGNOSIS — R06.00 DYSPNEA, UNSPECIFIED TYPE: Primary | ICD-10-CM

## 2021-05-21 PROBLEM — L03.116 CELLULITIS OF LEFT FOOT: Status: ACTIVE | Noted: 2021-05-21

## 2021-05-21 LAB
ALBUMIN SERPL-MCNC: 3.2 G/DL (ref 3.4–5)
ALBUMIN/GLOB SERPL: 0.8 {RATIO} (ref 0.8–1.7)
ALP SERPL-CCNC: 74 U/L (ref 45–117)
ALT SERPL-CCNC: 25 U/L (ref 13–56)
ANION GAP SERPL CALC-SCNC: 7 MMOL/L (ref 3–18)
ARTERIAL PATENCY WRIST A: ABNORMAL
AST SERPL-CCNC: 17 U/L (ref 10–38)
ATRIAL RATE: 57 BPM
ATRIAL RATE: 58 BPM
BASE DEFICIT BLD-SCNC: 1.7 MMOL/L
BASOPHILS # BLD: 0.1 K/UL (ref 0–0.1)
BASOPHILS NFR BLD: 0 % (ref 0–2)
BDY SITE: ABNORMAL
BILIRUB SERPL-MCNC: 0.6 MG/DL (ref 0.2–1)
BNP SERPL-MCNC: 555 PG/ML (ref 0–1800)
BUN SERPL-MCNC: 17 MG/DL (ref 7–18)
BUN/CREAT SERPL: 16 (ref 12–20)
CALCIUM SERPL-MCNC: 8.5 MG/DL (ref 8.5–10.1)
CALCULATED P AXIS, ECG09: 40 DEGREES
CALCULATED P AXIS, ECG09: 54 DEGREES
CALCULATED R AXIS, ECG10: -19 DEGREES
CALCULATED R AXIS, ECG10: -23 DEGREES
CALCULATED T AXIS, ECG11: 38 DEGREES
CALCULATED T AXIS, ECG11: 43 DEGREES
CHLORIDE SERPL-SCNC: 107 MMOL/L (ref 100–111)
CK MB CFR SERPL CALC: ABNORMAL % (ref 0–4)
CK MB SERPL-MCNC: <1 NG/ML (ref 5–25)
CK SERPL-CCNC: 137 U/L (ref 26–192)
CK SERPL-CCNC: 151 U/L (ref 26–192)
CK SERPL-CCNC: 158 U/L (ref 26–192)
CO2 SERPL-SCNC: 26 MMOL/L (ref 21–32)
COVID-19 RAPID TEST, COVR: NOT DETECTED
CREAT SERPL-MCNC: 1.07 MG/DL (ref 0.6–1.3)
DIAGNOSIS, 93000: NORMAL
DIAGNOSIS, 93000: NORMAL
DIFFERENTIAL METHOD BLD: ABNORMAL
EOSINOPHIL # BLD: 0.1 K/UL (ref 0–0.4)
EOSINOPHIL NFR BLD: 1 % (ref 0–5)
ERYTHROCYTE [DISTWIDTH] IN BLOOD BY AUTOMATED COUNT: 14.2 % (ref 11.6–14.5)
GAS FLOW.O2 O2 DELIVERY SYS: ABNORMAL L/MIN
GAS FLOW.O2 SETTING OXYMISER: 26 BPM
GLOBULIN SER CALC-MCNC: 3.8 G/DL (ref 2–4)
GLUCOSE SERPL-MCNC: 97 MG/DL (ref 74–99)
HCO3 BLD-SCNC: 22.6 MMOL/L (ref 22–26)
HCT VFR BLD AUTO: 34.9 % (ref 35–45)
HGB BLD-MCNC: 11 G/DL (ref 12–16)
LYMPHOCYTES # BLD: 2 K/UL (ref 0.9–3.6)
LYMPHOCYTES NFR BLD: 17 % (ref 21–52)
MAGNESIUM SERPL-MCNC: 1.8 MG/DL (ref 1.6–2.6)
MCH RBC QN AUTO: 30.4 PG (ref 24–34)
MCHC RBC AUTO-ENTMCNC: 31.5 G/DL (ref 31–37)
MCV RBC AUTO: 96.4 FL (ref 74–97)
MONOCYTES # BLD: 1.2 K/UL (ref 0.05–1.2)
MONOCYTES NFR BLD: 10 % (ref 3–10)
NEUTS SEG # BLD: 8.4 K/UL (ref 1.8–8)
NEUTS SEG NFR BLD: 71 % (ref 40–73)
P-R INTERVAL, ECG05: 176 MS
P-R INTERVAL, ECG05: 206 MS
PCO2 BLD: 35.9 MMHG (ref 35–45)
PH BLD: 7.41 [PH] (ref 7.35–7.45)
PLATELET # BLD AUTO: 270 K/UL (ref 135–420)
PMV BLD AUTO: 9.9 FL (ref 9.2–11.8)
PO2 BLD: 69 MMHG (ref 80–100)
POTASSIUM SERPL-SCNC: 4.2 MMOL/L (ref 3.5–5.5)
PROT SERPL-MCNC: 7 G/DL (ref 6.4–8.2)
Q-T INTERVAL, ECG07: 428 MS
Q-T INTERVAL, ECG07: 432 MS
QRS DURATION, ECG06: 84 MS
QRS DURATION, ECG06: 96 MS
QTC CALCULATION (BEZET), ECG08: 416 MS
QTC CALCULATION (BEZET), ECG08: 424 MS
RBC # BLD AUTO: 3.62 M/UL (ref 4.2–5.3)
SAO2 % BLD: 93.7 % (ref 92–97)
SERVICE CMNT-IMP: ABNORMAL
SODIUM SERPL-SCNC: 140 MMOL/L (ref 136–145)
SOURCE, COVRS: NORMAL
SPECIMEN TYPE: ABNORMAL
TROPONIN I SERPL-MCNC: 0.05 NG/ML (ref 0–0.04)
TROPONIN I SERPL-MCNC: 0.05 NG/ML (ref 0–0.04)
TROPONIN I SERPL-MCNC: 0.06 NG/ML (ref 0–0.04)
TSH SERPL DL<=0.05 MIU/L-ACNC: 1.61 UIU/ML (ref 0.36–3.74)
TSH SERPL DL<=0.05 MIU/L-ACNC: 1.63 UIU/ML (ref 0.36–3.74)
VENTRICULAR RATE, ECG03: 57 BPM
VENTRICULAR RATE, ECG03: 58 BPM
WBC # BLD AUTO: 11.9 K/UL (ref 4.6–13.2)

## 2021-05-21 PROCEDURE — 96374 THER/PROPH/DIAG INJ IV PUSH: CPT

## 2021-05-21 PROCEDURE — 65660000000 HC RM CCU STEPDOWN

## 2021-05-21 PROCEDURE — 82553 CREATINE MB FRACTION: CPT

## 2021-05-21 PROCEDURE — 74011250636 HC RX REV CODE- 250/636: Performed by: HOSPITALIST

## 2021-05-21 PROCEDURE — 93005 ELECTROCARDIOGRAM TRACING: CPT

## 2021-05-21 PROCEDURE — 99285 EMERGENCY DEPT VISIT HI MDM: CPT

## 2021-05-21 PROCEDURE — 83880 ASSAY OF NATRIURETIC PEPTIDE: CPT

## 2021-05-21 PROCEDURE — 83735 ASSAY OF MAGNESIUM: CPT

## 2021-05-21 PROCEDURE — 73630 X-RAY EXAM OF FOOT: CPT

## 2021-05-21 PROCEDURE — 65390000012 HC CONDITION CODE 44 OBSERVATION

## 2021-05-21 PROCEDURE — 82803 BLOOD GASES ANY COMBINATION: CPT

## 2021-05-21 PROCEDURE — 87635 SARS-COV-2 COVID-19 AMP PRB: CPT

## 2021-05-21 PROCEDURE — 74011000250 HC RX REV CODE- 250: Performed by: INTERNAL MEDICINE

## 2021-05-21 PROCEDURE — 80053 COMPREHEN METABOLIC PANEL: CPT

## 2021-05-21 PROCEDURE — 74011250637 HC RX REV CODE- 250/637: Performed by: HOSPITALIST

## 2021-05-21 PROCEDURE — 84443 ASSAY THYROID STIM HORMONE: CPT

## 2021-05-21 PROCEDURE — 94640 AIRWAY INHALATION TREATMENT: CPT

## 2021-05-21 PROCEDURE — 71275 CT ANGIOGRAPHY CHEST: CPT

## 2021-05-21 PROCEDURE — 96372 THER/PROPH/DIAG INJ SC/IM: CPT

## 2021-05-21 PROCEDURE — 74011000250 HC RX REV CODE- 250: Performed by: HOSPITALIST

## 2021-05-21 PROCEDURE — 36600 WITHDRAWAL OF ARTERIAL BLOOD: CPT

## 2021-05-21 PROCEDURE — 96375 TX/PRO/DX INJ NEW DRUG ADDON: CPT

## 2021-05-21 PROCEDURE — 74011000636 HC RX REV CODE- 636: Performed by: EMERGENCY MEDICINE

## 2021-05-21 PROCEDURE — 71045 X-RAY EXAM CHEST 1 VIEW: CPT

## 2021-05-21 PROCEDURE — 85025 COMPLETE CBC W/AUTO DIFF WBC: CPT

## 2021-05-21 PROCEDURE — 77010033678 HC OXYGEN DAILY

## 2021-05-21 RX ORDER — MORPHINE SULFATE 2 MG/ML
1 INJECTION, SOLUTION INTRAMUSCULAR; INTRAVENOUS
Status: DISCONTINUED | OUTPATIENT
Start: 2021-05-21 | End: 2021-05-26 | Stop reason: HOSPADM

## 2021-05-21 RX ORDER — OLMESARTAN MEDOXOMIL 20 MG/1
20 TABLET ORAL DAILY
Status: DISCONTINUED | OUTPATIENT
Start: 2021-05-22 | End: 2021-05-23

## 2021-05-21 RX ORDER — HYDROCHLOROTHIAZIDE 12.5 MG/1
12.5 CAPSULE ORAL DAILY
Status: DISCONTINUED | OUTPATIENT
Start: 2021-05-22 | End: 2021-05-23

## 2021-05-21 RX ORDER — HEPARIN SODIUM 5000 [USP'U]/ML
5000 INJECTION, SOLUTION INTRAVENOUS; SUBCUTANEOUS EVERY 8 HOURS
Status: DISCONTINUED | OUTPATIENT
Start: 2021-05-21 | End: 2021-05-26 | Stop reason: HOSPADM

## 2021-05-21 RX ORDER — SAME BUTANEDISULFONATE/BETAINE 400-600 MG
500 POWDER IN PACKET (EA) ORAL 2 TIMES DAILY
Status: DISCONTINUED | OUTPATIENT
Start: 2021-05-21 | End: 2021-05-26 | Stop reason: HOSPADM

## 2021-05-21 RX ORDER — GABAPENTIN 600 MG/1
600 TABLET ORAL 3 TIMES DAILY
COMMUNITY

## 2021-05-21 RX ORDER — OLMESARTAN MEDOXOMIL AND HYDROCHLOROTHIAZIDE 20/12.5 20; 12.5 MG/1; MG/1
1 TABLET ORAL DAILY
Status: DISCONTINUED | OUTPATIENT
Start: 2021-05-22 | End: 2021-05-21

## 2021-05-21 RX ORDER — VANCOMYCIN 2 GRAM/500 ML IN 0.9 % SODIUM CHLORIDE INTRAVENOUS
2000 ONCE
Status: COMPLETED | OUTPATIENT
Start: 2021-05-21 | End: 2021-05-21

## 2021-05-21 RX ORDER — IPRATROPIUM BROMIDE AND ALBUTEROL SULFATE 2.5; .5 MG/3ML; MG/3ML
3 SOLUTION RESPIRATORY (INHALATION)
Status: DISCONTINUED | OUTPATIENT
Start: 2021-05-21 | End: 2021-05-24

## 2021-05-21 RX ORDER — ACETAMINOPHEN 325 MG/1
650 TABLET ORAL
Status: DISCONTINUED | OUTPATIENT
Start: 2021-05-21 | End: 2021-05-26 | Stop reason: HOSPADM

## 2021-05-21 RX ORDER — ATENOLOL 100 MG/1
100 TABLET ORAL DAILY
COMMUNITY
End: 2021-05-26

## 2021-05-21 RX ORDER — OXYCODONE AND ACETAMINOPHEN 5; 325 MG/1; MG/1
1 TABLET ORAL
Status: DISCONTINUED | OUTPATIENT
Start: 2021-05-21 | End: 2021-05-26 | Stop reason: HOSPADM

## 2021-05-21 RX ORDER — PANTOPRAZOLE SODIUM 40 MG/1
40 TABLET, DELAYED RELEASE ORAL
Status: DISCONTINUED | OUTPATIENT
Start: 2021-05-22 | End: 2021-05-26 | Stop reason: HOSPADM

## 2021-05-21 RX ORDER — NITROGLYCERIN 0.4 MG/1
0.4 TABLET SUBLINGUAL
Status: DISCONTINUED | OUTPATIENT
Start: 2021-05-21 | End: 2021-05-26 | Stop reason: HOSPADM

## 2021-05-21 RX ORDER — GUAIFENESIN 100 MG/5ML
81 LIQUID (ML) ORAL DAILY
Status: DISCONTINUED | OUTPATIENT
Start: 2021-05-21 | End: 2021-05-26 | Stop reason: HOSPADM

## 2021-05-21 RX ADMIN — IPRATROPIUM BROMIDE AND ALBUTEROL SULFATE 3 ML: .5; 3 SOLUTION RESPIRATORY (INHALATION) at 20:20

## 2021-05-21 RX ADMIN — ASPIRIN 81 MG: 81 TABLET, CHEWABLE ORAL at 13:04

## 2021-05-21 RX ADMIN — Medication 500 MG: at 22:11

## 2021-05-21 RX ADMIN — HEPARIN SODIUM 5000 UNITS: 5000 INJECTION INTRAVENOUS; SUBCUTANEOUS at 19:55

## 2021-05-21 RX ADMIN — IOPAMIDOL 100 ML: 755 INJECTION, SOLUTION INTRAVENOUS at 08:26

## 2021-05-21 RX ADMIN — VANCOMYCIN HYDROCHLORIDE 2000 MG: 10 INJECTION, POWDER, LYOPHILIZED, FOR SOLUTION INTRAVENOUS at 16:23

## 2021-05-21 RX ADMIN — HEPARIN SODIUM 5000 UNITS: 5000 INJECTION INTRAVENOUS; SUBCUTANEOUS at 13:04

## 2021-05-21 RX ADMIN — WATER 1 G: 1 INJECTION INTRAMUSCULAR; INTRAVENOUS; SUBCUTANEOUS at 14:53

## 2021-05-21 RX ADMIN — OXYCODONE HYDROCHLORIDE AND ACETAMINOPHEN 1 TABLET: 5; 325 TABLET ORAL at 19:55

## 2021-05-21 NOTE — H&P
History & Physical    Patient: Gal Jonas MRN: [id number]  CSN: [id number]    YOB: 1935  Age: 80 y.o. Sex: female      DOA: 5/21/2021  Primary Care Provider: Khai Lopez DO      Assessment/Plan     Hospital Problems  Date Reviewed: 4/29/2015        Codes Class Noted POA    Bradycardia ICD-10-CM: R00.1  ICD-9-CM: 427.89  5/21/2021 Unknown        Lung nodule ICD-10-CM: R91.1  ICD-9-CM: 793.11  5/21/2021 Unknown        Dyspnea ICD-10-CM: R06.00  ICD-9-CM: 786.09  5/21/2021 Unknown        Cellulitis of left foot ICD-10-CM: L03.116  ICD-9-CM: 682.7  5/21/2021 Unknown        Morbid obesity ([facility] [address] ICD-10-CM: E66.01  ICD-9-CM: 278.01  6/10/2013 Yes        Chest pain ICD-10-CM: R07.9  ICD-9-CM: 786.50  6/9/2013 Unknown        HTN (hypertension) ICD-10-CM: I10  ICD-9-CM: 401.9  6/9/2013 Yes              Admit to tel   Chest pain  Cardiac monitor, ce trend need to r/o acs   Ekg: no st change at this point,  Will give nc O2 , morphine /nitro prn for chest pain  Cardiology consult   Last stress test she can not remember   Need cath vs stress test will defer to cardiology   CTA chest negative for PE    Dyspnea  Work-up for ACS  CTA chest no infection  We will check COVID-19    Lung nodule   Case discussed with Dr. Lucien Herrera  Needed further evaluation    Bradycardia  Improving per talking  Check TSH    Cellulitis of left foot  Repeat x-ray  Antibiotics    Morbid obesity lifestyle modification    htn continue home medication   gerd ppi     Full code  Estimate  length of stay : 2-3 day    DVT : heparin ppi proph  CC: Chest pain        HPI:     Gal Jonas is a 80 y.o. female with history of asthma, reflux disease, OA, stroke, morbid obesity came to ER due to chest pain since yesterday. She reported chest pain on and off with dyspnea since yesterday. Worsening while exertion chest pain located the right side of chest no radiation. She was found troponin mildly elevated.   CTA chest no PE, but indicated 1 cm nodule suspected malignancy. EKG no ST segment changes. Cardiology was consulted. She also reported she had pain in his left foot for several days associated with swelling. She visited the ER 2 days ago x-ray indicated no OM. She reported her pain did not improving. Received COVID-19 2 shots vaccination. Denies any exposure to COVID-19. Currently she has no  chest pain.     Admission and treatment discussed with patient and family, all agree and indicated a verbal understanding   Visit Vitals  BP (!) 142/53   Pulse 71   Temp 98.2 °F (36.8 °C)   Resp 23   Ht 5' 3\" (1.6 m)   Wt 108 kg (238 lb)   SpO2 100%   BMI 42.16 kg/m²    O2 Flow Rate (L/min): 2 l/min O2 Device: Nasal cannula      Past Medical History:   Diagnosis Date    Arthritis     osteo    Asthma     inhaler as needed - every 2 to 3 years    Chronic pain     knee and hip ,shoulder     GERD (gastroesophageal reflux disease)     Hypertension     5yrs    Irritable bowel     Other ill-defined conditions(799.89)     h/o migraines    Other ill-defined conditions(799.89)     h/o dizzy    Other ill-defined conditions(799.89)     IBS; hiatal hernia    Psychiatric disorder     depression    Stroke ([address] 9 years ago     stroke - no deficits    Stroke ([address]     In 1997/98       Past Surgical History:   Procedure Laterality Date    HX CHOLECYSTECTOMY      HX HERNIA REPAIR      umbilical twice    HX HYSTERECTOMY      HX INTRAOCULAR LENS PROSTHESIS      HX KNEE ARTHROSCOPY      left    HX KNEE REPLACEMENT Left 2013    HX OTHER SURGICAL      herniated disc surgery    HX TUBAL LIGATION      LAP UMBILICAL HERNIA REPAIR      NEUROLOGICAL PROCEDURE UNLISTED      lower back    HI CARDIAC SURG PROCEDURE UNLIST      HI LAP,CHOLECYSTECTOMY         Family History   Problem Relation Age of Onset    Cancer Father     Malignant Hyperthermia Neg Hx     Pseudocholinesterase Deficiency Neg Hx     Post-op Nausea/Vomiting Neg Hx     Delayed Awakening Neg Hx     Emergence Delirium Neg Hx     Post-op Cognitive Dysfunction Neg Hx     Other Neg Hx        Social History     Socioeconomic History    Marital status: SINGLE     Spouse name: Not on file    Number of children: Not on file    Years of education: Not on file    Highest education level: Not on file   Tobacco Use    Smoking status: Never Smoker    Smokeless tobacco: Never Used   Substance and Sexual Activity    Alcohol use: No    Drug use: No     Social Determinants of Health     Financial Resource Strain:     Difficulty of Paying Living Expenses:    Food Insecurity:     Worried About Running Out of Food in the Last Year:     [address] in the Last Year:    Transportation Needs:     Lack of Transportation (Medical):  Lack of Transportation (Non-Medical):    Physical Activity:     Days of Exercise per Week:     Minutes of Exercise per Session:    Stress:     Feeling of Stress :    Social Connections:     Frequency of Communication with Friends and Family:     Frequency of Social Gatherings with Friends and Family:     Attends Gnosticism Services:     Active Member of Clubs or Organizations:     Attends Club or Organization Meetings:     Marital Status:        Prior to Admission medications    Medication Sig Start Date End Date Taking? Authorizing Provider   levoFLOXacin (Levaquin) 500 mg tablet Take 1 Tablet by mouth daily for 10 days. 5/19/21 5/29/21  Desiree Mike MD   oxyCODONE-acetaminophen (Percocet) 5-325 mg per tablet Take 1 Tablet by mouth every four (4) hours as needed for Pain for up to 7 days. Max Daily Amount: 6 Tablets. Take 1 tablet every 4-6 hours as needed for pain control. If you were instructed to try over the counter ibuprofen or tylenol, only take the percocet for pain not controlled with the over the counter medication. 5/19/21 5/26/21  Desiree Mike MD   colchicine (COLCRYS) 0.6 mg tablet Take 0.6 mg by mouth as needed. Provider, Historical   meclizine (ANTIVERT) 12.5 mg tablet Take 12.5 mg by mouth three (3) times daily as needed. Provider, Historical   olmesartan-hydroCHLOROthiazide (BENICAR HCT) 20-12.5 mg per tablet Take 1 Tab by mouth daily. Provider, Historical   folic acid/multivit-min/lutein (CENTRUM SILVER PO) Take  by mouth daily. Provider, Historical   Dexlansoprazole (DEXILANT) 60 mg CpDB Take 60 mg by mouth as needed. Other, MD Maurizio   potassium chloride (KLOR-CON) 20 mEq packet Take 20 mEq by mouth daily. Provider, Historical   dipyridamole-aspirin (AGGRENOX) 200-25 mg per SR capsule Take 1 Cap by mouth daily. Provider, Historical   calcium carbonate (TUMS) 200 mg calcium (500 mg) Chew Take 1 Tab by mouth as needed. Provider, Historical       No Known Allergies    Review of Systems  Gen: No fever, chills, malaise, weight loss/gain. Heent: No headache, rhinorrhea, epistaxis, ear pain, hearing loss, sinus pain, neck pain/stiffness, sore throat. Heart: +chest pain, no palpitations, MACIAS, pnd, or orthopnea. Resp: No cough, hemoptysis, wheezing and shortness of breath. GI: No nausea, vomiting, diarrhea, constipation, melena or hematochezia. : No urinary obstruction, dysuria or hematuria. Derm: No rash, new skin lesion or pruritis. Musc/skeletal: left foot pain and swelling   Vasc: No edema, cyanosis or claudication. Endo: No heat/cold intolerance, no polyuria,polydipsia or polyphagia. Neuro: No unilateral weakness, numbness, tingling. No seizures. Heme: No easy bruising or bleeding. Physical Exam:     Physical Exam:  Visit Vitals  BP (!) 142/53   Pulse 71   Temp 98.2 °F (36.8 °C)   Resp 23   Ht 5' 3\" (1.6 m)   Wt 108 kg (238 lb)   SpO2 100%   BMI 42.16 kg/m²    O2 Flow Rate (L/min): 2 l/min O2 Device: Nasal cannula    Temp (24hrs), Av.2 °F (36.8 °C), Min:98.2 °F (36.8 °C), Max:98.2 °F (36.8 °C)    No intake/output data recorded.    No intake/output data recorded. General:  Awake, cooperative, no distress. Head:  Normocephalic, without obvious abnormality, atraumatic. Eyes:  Conjunctivae/corneas clear, sclera anicteric, PERRL, EOMs intact. Nose: Nares normal. No drainage or sinus tenderness. Throat: Lips, mucosa, and tongue normal. .   Neck: Supple, symmetrical, trachea midline, no adenopathy. Lungs:   Clear to auscultation bilaterally. Heart:  Santi , S1, S2 normal, no murmur, click, rub or gallop. Abdomen: Soft, non-tender. Bowel sounds normal. No masses,  No organomegaly. Extremities: Extremities normal, atraumatic, no cyanosis, left foot swelling -tenderness on touch, erythema    Pulses: 2+ and symmetric all extremities. Skin: Skin color-pink, texture, turgor normal. No rashes or lesions except above . Capillary refill normal    Neurologic: CNII-XII intact. No focal motor or sensory deficit.        Labs Reviewed:    BMP:   Lab Results   Component Value Date/Time     05/21/2021 06:05 AM    K 4.2 05/21/2021 06:05 AM     05/21/2021 06:05 AM    CO2 26 05/21/2021 06:05 AM    AGAP 7 05/21/2021 06:05 AM    GLU 97 05/21/2021 06:05 AM    BUN 17 05/21/2021 06:05 AM    CREA 1.07 05/21/2021 06:05 AM    GFRAA 59 (L) 05/21/2021 06:05 AM    GFRNA 49 (L) 05/21/2021 06:05 AM     CMP:   Lab Results   Component Value Date/Time     05/21/2021 06:05 AM    K 4.2 05/21/2021 06:05 AM     05/21/2021 06:05 AM    CO2 26 05/21/2021 06:05 AM    AGAP 7 05/21/2021 06:05 AM    GLU 97 05/21/2021 06:05 AM    BUN 17 05/21/2021 06:05 AM    CREA 1.07 05/21/2021 06:05 AM    GFRAA 59 (L) 05/21/2021 06:05 AM    GFRNA 49 (L) 05/21/2021 06:05 AM    CA 8.5 05/21/2021 06:05 AM    MG 1.8 05/21/2021 06:05 AM    ALB 3.2 (L) 05/21/2021 06:05 AM    TP 7.0 05/21/2021 06:05 AM    GLOB 3.8 05/21/2021 06:05 AM    AGRAT 0.8 05/21/2021 06:05 AM    ALT 25 05/21/2021 06:05 AM     CBC:   Lab Results   Component Value Date/Time    WBC 11.9 05/21/2021 06:05 AM    HGB 11.0 (L) 05/21/2021 06:05 AM    HCT 34.9 (L) 05/21/2021 06:05 AM     05/21/2021 06:05 AM     All Cardiac Markers in the last 24 hours:   Lab Results   Component Value Date/Time     05/21/2021 09:38 AM     05/21/2021 06:05 AM    CKMB <1.0 05/21/2021 09:38 AM    CKMB <1.0 05/21/2021 06:05 AM    CKND1  05/21/2021 09:38 AM     CALCULATION NOT PERFORMED WHEN RESULT IS BELOW LINEAR LIMIT    CKND1  05/21/2021 06:05 AM     CALCULATION NOT PERFORMED WHEN RESULT IS BELOW LINEAR LIMIT    TROIQ 0.06 (H) 05/21/2021 09:38 AM    TROIQ 0.05 (H) 05/21/2021 06:05 AM     Recent Glucose Results:   Lab Results   Component Value Date/Time    GLU 97 05/21/2021 06:05 AM     ABG:   Lab Results   Component Value Date/Time    PHI 7.41 05/21/2021 06:55 AM    PCO2I 35.9 05/21/2021 06:55 AM    PO2I 69 (L) 05/21/2021 06:55 AM    HCO3I 22.6 05/21/2021 06:55 AM     COAGS: No results found for: APTT, PTP, INR, INREXT, INREXT  Liver Panel:   Lab Results   Component Value Date/Time    ALB 3.2 (L) 05/21/2021 06:05 AM    TP 7.0 05/21/2021 06:05 AM    GLOB 3.8 05/21/2021 06:05 AM    AGRAT 0.8 05/21/2021 06:05 AM    ALT 25 05/21/2021 06:05 AM    AP 74 05/21/2021 06:05 AM     Pancreatic Markers: No results found for: AMYLPOCT, AML, LIPPOCT, LPSE    XR HIP LT W OR WO PELV 2-3 VWS    Result Date: 5/19/2021  MEDICAL RECORDS NUMBER: [id number] PROCEDURE: 2 views of the pelvis and left hip DATE: 5/19/2021 6:10 PM HISTORY: 80years old Female. left hip pain Comparison: 1/11/2019 FINDINGS: Again demonstrated are postoperative changes related to a left hip prosthesis. There is relatively good anatomic alignment of the hardware without significant interval change in alignment. There is no osseous abnormality of the pelvis or bilateral proximal femurs. 1. No obvious complicating features of the left hip prosthesis. No acute osseous abnormality. This report has been generated using voice recognition software.      XR FOOT LT MIN 3 V    Result Date: 5/19/2021  MEDICAL RECORDS NUMBER: [id number] PROCEDURE: 3 views of the left foot. DATE: 5/19/2021 6:14 PM HISTORY: 80years old Female. pain and swelling Comparison: 7/6/2020 FINDINGS: There is no acute osseous abnormality involving the bones of the hindfoot midfoot or forefoot. There is degenerative osteophytosis noted of the calcaneus. The joint spaces are relatively preserved. Soft tissues are grossly unremarkable. 1. Degenerative calcaneal spurring without evidence of a significant acute osseous abnormality of the left foot. This report has been generated using voice recognition software. CTA CHEST W OR W WO CONT    Result Date: 5/21/2021  CT PA CHEST, PULMONARY ARTERIES History: chest pain and shortness of breath, suspected pulmonary embolism. CT PA technique: Serial axial helical thin section high bolus rate contrast enhanced CT performed from above the diaphragm to just above the aortic arch with nonionic iodine containing IV contrast.  Coronal and sagittal reformations were also performed from axial data to optimize longitudinal assessment for embolus burden and confirm equivocal findings on the axial images. Additional 3-D slab MIP reconstructions were performed. Dose reduction technique: Automated exposure control, adjustment of the mAs and/or kVp according to the patient 's size, and iterative reconstruction techniques were used. Specifics for this study were placed by technologist staff into the patient's electronic medical record. Approximate dose, if determined, reference air kerma: Digital imaging and Communication in Medicine [DICOM] format image data are available to nonaffiliated external healthcare facilities or entities on a secure media free reciprocally searchable basis, with patient authorization, for at least a 12 month period after this study.  If not available when requested, the health care system, which is responsible for storage archival and delivery, should be contacted directly. Macro Dose Quality very good No prior CT exams available. Comparison portable chest same date CT findings: Pulmonary Vasc. Corey [place]: There is no evident hypodense intraluminal thrombus, abrupt vascular cutoff, definite eccentric wall thickening down to the level to suggest acute pulmonary thromboembolism. Pulmonary trunk is normal caliber. Thoracic aorta is normal caliber. No evident mediastinal hematoma. Lungs: Lungs are mildly hypoinflated. . There is a spiculated bordered 1 cm soft tissue density pulmonary nodule in the junction of apical and anterior segments right upper lobe. There are a 0.4 cm peripheral pulmonary nodule in the upper posterior basal segment left lower lobe and 0.6 cm nodule more caudally in the same subsegment. Tiny 0.3 cm peripheral pulmonary nodule, perifissural lateral basal segment right lower lobe. Other punctate fissural based nodules multifocally trim right lung probably small pleural lymph nodes. Minimal curvilinear partial atelectasis or scarring, superior lingula. Minimal dependent reticular partial atelectasis. No discrete consolidation, or nodular interstitial thickening. Central airways are patent. Pleura: No definite mass  or pleural effusion. Mild prominence of subpleural fat and paracardiac fat. Mediastinum: No pericardial effusion. Mild cardiomegaly, with multichamber including left heart enlargement. Few scattered normal sized mediastinal lymph nodes. No evident mediastinal or hilar lymphadenopathy. Misc: Large habitus. Multilevel spondylosis, confluent anterior longitudinal ligament ossification. No aggressive/destructive bony lesions are evident acute fracture Upper abdomen: Cholecystectomy clips, interlobar fissure. Small hiatal hernia. Prominent perihilar fat     1. No CTPA evidence of acute pulmonary thromboembolism. Mild hypoinflation and dependent partial atelectasis. 2.  No prior chest CT.  Spiculated 1 cm pulmonary nodule, junction of apical and anterior segment right upper lobe, most likely small lung cancer. Other small and indeterminate pulmonary nodules and probable fissural-based pleural lymph nodes. 3. Cardiomegaly. Large habitus. Spondylosis. Cholecystectomy    XR CHEST PORT    Result Date: 5/21/2021  EXAM: XR CHEST PORT CLINICAL INDICATION/HISTORY: SOB -Additional: None COMPARISON: 7/6/2020 TECHNIQUE: Portable frontal view of the chest _______________ FINDINGS: SUPPORT DEVICES: None. HEART AND MEDIASTINUM: Cardiomediastinal silhouette within normal limits. LUNGS AND PLEURAL SPACES: No dense consolidation, large effusion or pneumothorax. _______________     No acute cardiopulmonary abnormality. DUPLEX LOWER EXT VENOUS LEFT    Result Date: 5/20/2021  · No evidence of deep vein thrombosis in the left lower extremity. Procedures/imaging: see electronic medical records for all procedures/Xrays and details which were not copied into this note but were reviewed prior to creation of Reji Sena MD, Internal Medicine     CC:  Jaiden Barragan DO

## 2021-05-21 NOTE — ED NOTES
Report given to GERALDO Bruce. Patient in NAD. VSS. Easy WOB. AOX4. Bed low and locked. Call bell within reach.

## 2021-05-21 NOTE — CONSULTS
Pulmonary and Sleep Medicine     Pulmonary Note    Patient: Andressa Chan               Sex: female          DOA: 5/21/2021       YOB: 1935      Age:  80 y.o.        LOS:  LOS: 0 days        Reason for Consult: lung nodules    IMPRESSION:   Patient Active Problem List   Diagnosis Code    Atypical chest pain R07.89    Other and unspecified ovarian cyst N83.209    Endometrial hyperplasia N85.00    Chest pain R07.9    Cerebrovascular disease I67.9    HTN (hypertension) I10    Morbid obesity ([facility] [address] E66.01    Laryngitis J04.0    Acute upper respiratory infection J06.9    MACIAS (dyspnea on exertion) R06.00    Palpitation R00.2    H/O total hip arthroplasty, left J64.289    Bradycardia R00.1    Lung nodule R91.1    Dyspnea R06.00    Cellulitis of left foot L03. 116 ·   Asthma - J45.909  · Morbid obesity   RECOMMENDATIONS:   Continue supplemental O2 via NC, titrate flow for goal SPO2> 91%  Continue bronchodilators, pulmonary hygiene care  No clinical evidence for pneumonia  Dyspnea likely related to body habitus as no evidence for pneumonia, PE, PHTN, CHF.  No clinical evidence for asthma exacerbation  Antibiotic per hospitalist for cellulitis of leg  Cultures drawn and will be followed  Aspiration prevention bundle, head of the bed at 30' all times    I reviewed CTA chest with reading radiologist. RUL 1 cm nodule spiculated, tethering to pulmonary parenchyma concerning for mlg  No recent mammogram and is overdue for colonoscopy per pt  Discussed various options of work up including but not limited to PET scan, bronchoscopy, CT guided needle bx, other  Lesion is somewhat peripheral for yield with bronchoscopy  Will discuss with IR regarding any option with CT guided needle bx for tissue sample    Continue cardiac work-up as per Dr. Cesar Dinh ulcer and DVT prophylaxis  AM labs  Diet as tolerated    Will see pt back on Monday and be available over the weekend for any questions  Will defer respective systems problem management to primary and other consultant and follow patient with primary and other team  Further recommendations will be based on the patient's response to recommended treatment and results of the investigation ordered. Total care time 65 minutes excluding any procedure, family discussions     HPI:   Ms. Carole Pat is a 80 y.o. female who is seen at the request of Dr. Emi Marquez for pulmonary nodules. Pt reports PMHx for childhood asthma that has been in remission, morbid obesity, GERD, arthritis, mood disorders, chronic pain, hypertension, IBS presented to [facility] ER with complaint of dyspnea, foot discomfort without any fever, chills, wheezing, cough, phlegm, orthopnea, PND. She reported some chest discomfort midsternal nonradiating associated with feeling of palpitation and mild left ankle swelling. Non-smoker. Not on any inhalers. Denies occupational exposure or significant family history for chronic lung disease. Reports prior cardiac work-up. In the ER, CTA chest showed multiple pulmonary nodules, one of them in RUL 1 cm concerning for lung malignancy. Her serial cardiac enzymes were mildly elevated. Cardiology has been consulted. Pt seen in ER rm# 7 at bedside.     Review of Systems  General ROS: negative for  - fever, chills, weight loss, fatigue and malaise  Psychological ROS: negative for - anxiety or depression  Ophthalmic ROS: negative for - eye pain, loss of vision or photophobia  ENT ROS: negative for - epistaxis, headaches, sinus pain or vocal changes  Allergy and Immunology ROS: negative for - hives or postnasal drip  Hematological and Lymphatic ROS: negative for - bleeding problems, blood clots, jaundice, pallor or swollen lymph nodes  Endocrine ROS: negative for - skin changes, temperature intolerance or unexpected weight changes  Cardiovascular ROS: negative for - chest pain, edema, murmur, orthopnea, palpitations or pnd  Gastrointestinal ROS: no nausea, vomiting, abdominal pain, change in bowel habits, or black or bloody stools  Genito-Urinary ROS: no dysuria, trouble voiding, or hematuria  Musculoskeletal ROS: negative for - muscle pain or muscular weakness  Neurological ROS: no TIA or stroke symptoms  Dermatological ROS: negative for - pruritus, rash or skin lesion changes     Past Medical History  Past Medical History:   Diagnosis Date    Arthritis     osteo    Asthma     inhaler as needed - every 2 to 3 years    Chronic pain     knee and hip ,shoulder     GERD (gastroesophageal reflux disease)     Hypertension     5yrs    Irritable bowel     Other ill-defined conditions(799.89)     h/o migraines    Other ill-defined conditions(799.89)     h/o dizzy    Other ill-defined conditions(799.89)     IBS; hiatal hernia    Psychiatric disorder     depression    Stroke ([facility] [address] 9 years ago     stroke - no deficits    Stroke ([facility] [address]     In 1997/98       Past Surgical History  Past Surgical History:   Procedure Laterality Date    HX CHOLECYSTECTOMY      HX HERNIA REPAIR      umbilical twice    HX HYSTERECTOMY      HX INTRAOCULAR LENS PROSTHESIS      HX KNEE ARTHROSCOPY      left    HX KNEE REPLACEMENT Left 2013    HX OTHER SURGICAL      herniated disc surgery    HX TUBAL LIGATION      LAP UMBILICAL HERNIA REPAIR      NEUROLOGICAL PROCEDURE UNLISTED      lower back    MA CARDIAC SURG PROCEDURE UNLIST      MA LAP,CHOLECYSTECTOMY         Family History  Family History   Problem Relation Age of Onset    Cancer Father     Malignant Hyperthermia Neg Hx     Pseudocholinesterase Deficiency Neg Hx     Post-op Nausea/Vomiting Neg Hx     Delayed Awakening Neg Hx     Emergence Delirium Neg Hx     Post-op Cognitive Dysfunction Neg Hx     Other Neg Hx        Social History  Social History     Tobacco Use    Smoking status: Never Smoker    Smokeless tobacco: Never Used   Substance Use Topics    Alcohol use: No    Drug use: No        Medications  Current Facility-Administered Medications   Medication Dose Route Frequency    aspirin chewable tablet 81 mg  81 mg Oral DAILY    nitroglycerin (NITROSTAT) tablet 0.4 mg  0.4 mg SubLINGual Q5MIN PRN    acetaminophen (TYLENOL) tablet 650 mg  650 mg Oral Q4H PRN    morphine injection 1 mg  1 mg IntraVENous Q4H PRN    heparin (porcine) injection 5,000 Units  5,000 Units SubCUTAneous Q8H    oxyCODONE-acetaminophen (PERCOCET) 5-325 mg per tablet 1 Tablet  1 Tablet Oral Q4H PRN    [START ON 5/22/2021] pantoprazole (PROTONIX) tablet 40 mg  40 mg Oral ACB    cefTRIAXone (ROCEPHIN) 1 g in sterile water (preservative free) 10 mL IV syringe  1 g IntraVENous Q24H    Saccharomyces boulardii (FLORASTOR) capsule 500 mg  500 mg Oral BID    [START ON 5/22/2021] olmesartan (BENICAR) tablet 20 mg  20 mg Oral DAILY    And    [START ON 5/22/2021] hydroCHLOROthiazide (MICROZIDE) capsule 12.5 mg  12.5 mg Oral DAILY    vancomycin (VANCOCIN) 2000 mg in  ml infusion  2,000 mg IntraVENous ONCE    Vancomycin-Pharmacy to Dose  1 Each Other Rx Dosing/Monitoring    [START ON 5/22/2021] vancomycin (VANCOCIN) 1,000 mg in 0.9% sodium chloride 250 mL (VIAL-MATE)  1,000 mg IntraVENous Q24H     Prior to Admission medications    Medication Sig Start Date End Date Taking? Authorizing Provider   levoFLOXacin (Levaquin) 500 mg tablet Take 1 Tablet by mouth daily for 10 days. 5/19/21 5/29/21  Desiree Mike MD   oxyCODONE-acetaminophen (Percocet) 5-325 mg per tablet Take 1 Tablet by mouth every four (4) hours as needed for Pain for up to 7 days. Max Daily Amount: 6 Tablets. Take 1 tablet every 4-6 hours as needed for pain control. If you were instructed to try over the counter ibuprofen or tylenol, only take the percocet for pain not controlled with the over the counter medication. 5/19/21 5/26/21  Desiree Mike MD   colchicine (COLCRYS) 0.6 mg tablet Take 0.6 mg by mouth as needed.     Provider, Historical   meclizine (ANTIVERT) 12.5 mg tablet Take 12.5 mg by mouth three (3) times daily as needed. Provider, Historical   olmesartan-hydroCHLOROthiazide (BENICAR HCT) 20-12.5 mg per tablet Take 1 Tab by mouth daily. Provider, Historical   folic acid/multivit-min/lutein (CENTRUM SILVER PO) Take  by mouth daily. Provider, Historical   Dexlansoprazole (DEXILANT) 60 mg CpDB Take 60 mg by mouth as needed. Other, MD Maurizio   potassium chloride (KLOR-CON) 20 mEq packet Take 20 mEq by mouth daily. Provider, Historical   dipyridamole-aspirin (AGGRENOX) 200-25 mg per SR capsule Take 1 Cap by mouth daily. Provider, Historical   calcium carbonate (TUMS) 200 mg calcium (500 mg) Chew Take 1 Tab by mouth as needed. Provider, Historical       Allergy  No Known Allergies    Physical Exam:   Vital Signs:    Blood pressure (!) 142/53, pulse 71, temperature (P) 98.9 °F (37.2 °C), resp. rate 23, height 5' 3\" (1.6 m), weight 108 kg (238 lb), SpO2 100 %. Body mass index is 42.16 kg/m². O2 Device: Nasal cannula   O2 Flow Rate (L/min): 2 l/min   Temp (24hrs), Av.6 °F (37 °C), Min:98.2 °F (36.8 °C), Max:98.9 °F (37.2 °C)       Intake/Output:   Last shift:      No intake/output data recorded. Last 3 shifts: No intake/output data recorded.   No intake or output data in the 24 hours ending 21 1804     General: in no respiratory distress and acyanotic, alert and oriented times 3, cooperative, morbidly obese, on O2 via NC at 2 Lpm  HEENT: PERRLA, EOMI, fundi benign, throat normal without erythema or exudate  Neck: No abnormally enlarged lymph nodes or thyroid, supple  Chest: normal, obese chest wall  Lungs: normal air entry, clear to auscultation bilaterally, normal percussion posterior chest wall bilaterally, no tenderness/ rash, exam limitations from body habitus  Heart: Regular rate and rhythm, S1S2 present or without murmur or extra heart sounds  Abdomen: obese, bowel sounds normoactive, tympanic, abdomen is soft without significant tenderness, masses, organomegaly or guarding  Extremity: negative for edema, cyanosis, clubbing  Neuro: alert, oriented x 3, no defects noted in general exam.  Skin: Skin color, texture, turgor normal. No rashes or lesions    Labs Reviewed:  Recent Results (from the past 24 hour(s))   EKG, 12 LEAD, INITIAL    Collection Time: 05/21/21  5:25 AM   Result Value Ref Range    Ventricular Rate 57 BPM    Atrial Rate 57 BPM    P-R Interval 176 ms    QRS Duration 84 ms    Q-T Interval 428 ms    QTC Calculation (Bezet) 416 ms    Calculated P Axis 40 degrees    Calculated R Axis -23 degrees    Calculated T Axis 43 degrees    Diagnosis       Sinus bradycardia  Voltage criteria for left ventricular hypertrophy  Abnormal ECG  When compared with ECG of 06-JUL-2020 11:23,  No significant change was found     NT-PRO BNP    Collection Time: 05/21/21  6:05 AM   Result Value Ref Range    NT pro- 0 - 1,800 PG/ML   CARDIAC PANEL,(CK, CKMB & TROPONIN)    Collection Time: 05/21/21  6:05 AM   Result Value Ref Range    CK - MB <1.0 <3.6 ng/ml    CK-MB Index  0.0 - 4.0 %     CALCULATION NOT PERFORMED WHEN RESULT IS BELOW LINEAR LIMIT     26 - 192 U/L    Troponin-I, QT 0.05 (H) 0.0 - 0.045 NG/ML   CBC WITH AUTOMATED DIFF    Collection Time: 05/21/21  6:05 AM   Result Value Ref Range    WBC 11.9 4.6 - 13.2 K/uL    RBC 3.62 (L) 4.20 - 5.30 M/uL    HGB 11.0 (L) 12.0 - 16.0 g/dL    HCT 34.9 (L) 35.0 - 45.0 %    MCV 96.4 74.0 - 97.0 FL    MCH 30.4 24.0 - 34.0 PG    MCHC 31.5 31.0 - 37.0 g/dL    RDW 14.2 11.6 - 14.5 %    PLATELET 599 140 - 488 K/uL    MPV 9.9 9.2 - 11.8 FL    NEUTROPHILS 71 40 - 73 %    LYMPHOCYTES 17 (L) 21 - 52 %    MONOCYTES 10 3 - 10 %    EOSINOPHILS 1 0 - 5 %    BASOPHILS 0 0 - 2 %    ABS. NEUTROPHILS 8.4 (H) 1.8 - 8.0 K/UL    ABS. LYMPHOCYTES 2.0 0.9 - 3.6 K/UL    ABS. MONOCYTES 1.2 0.05 - 1.2 K/UL    ABS. EOSINOPHILS 0.1 0.0 - 0.4 K/UL    ABS.  BASOPHILS 0.1 0.0 - 0.1 K/UL    DF AUTOMATED     METABOLIC PANEL, COMPREHENSIVE    Collection Time: 05/21/21  6:05 AM   Result Value Ref Range    Sodium 140 136 - 145 mmol/L    Potassium 4.2 3.5 - 5.5 mmol/L    Chloride 107 100 - 111 mmol/L    CO2 26 21 - 32 mmol/L    Anion gap 7 3.0 - 18 mmol/L    Glucose 97 74 - 99 mg/dL    BUN 17 7.0 - 18 MG/DL    Creatinine 1.07 0.6 - 1.3 MG/DL    BUN/Creatinine ratio 16 12 - 20      GFR est AA 59 (L) >60 ml/min/1.73m2    GFR est non-AA 49 (L) >60 ml/min/1.73m2    Calcium 8.5 8.5 - 10.1 MG/DL    Bilirubin, total 0.6 0.2 - 1.0 MG/DL    ALT (SGPT) 25 13 - 56 U/L    AST (SGOT) 17 10 - 38 U/L    Alk.  phosphatase 74 45 - 117 U/L    Protein, total 7.0 6.4 - 8.2 g/dL    Albumin 3.2 (L) 3.4 - 5.0 g/dL    Globulin 3.8 2.0 - 4.0 g/dL    A-G Ratio 0.8 0.8 - 1.7     MAGNESIUM    Collection Time: 05/21/21  6:05 AM   Result Value Ref Range    Magnesium 1.8 1.6 - 2.6 mg/dL   TSH 3RD GENERATION    Collection Time: 05/21/21  6:05 AM   Result Value Ref Range    TSH 1.61 0.36 - 3.74 uIU/mL   TSH 3RD GENERATION    Collection Time: 05/21/21  6:05 AM   Result Value Ref Range    TSH 1.63 0.36 - 3.74 uIU/mL   BLOOD GAS, ARTERIAL POC    Collection Time: 05/21/21  6:55 AM   Result Value Ref Range    Device: ROOM AIR      pH (POC) 7.41 7.35 - 7.45      pCO2 (POC) 35.9 35.0 - 45.0 MMHG    pO2 (POC) 69 (L) 80 - 100 MMHG    HCO3 (POC) 22.6 22 - 26 MMOL/L    sO2 (POC) 93.7 92 - 97 %    Base deficit (POC) 1.7 mmol/L    Set Rate 26 bpm    Allens test (POC) NOT APPLICABLE      Site RIGHT RADIAL      Specimen type (POC) ARTERIAL      Performed by Annel Bray    EKG, 12 LEAD, SUBSEQUENT    Collection Time: 05/21/21  9:13 AM   Result Value Ref Range    Ventricular Rate 58 BPM    Atrial Rate 58 BPM    P-R Interval 206 ms    QRS Duration 96 ms    Q-T Interval 432 ms    QTC Calculation (Bezet) 424 ms    Calculated P Axis 54 degrees    Calculated R Axis -19 degrees    Calculated T Axis 38 degrees    Diagnosis       Sinus bradycardia  Moderate voltage criteria for LVH, may be normal variant  Borderline ECG  When compared with ECG of 21-MAY-2021 05:25,  No significant change was found     CARDIAC PANEL,(CK, CKMB & TROPONIN)    Collection Time: 05/21/21  9:38 AM   Result Value Ref Range    CK - MB <1.0 <3.6 ng/ml    CK-MB Index  0.0 - 4.0 %     CALCULATION NOT PERFORMED WHEN RESULT IS BELOW LINEAR LIMIT     26 - 192 U/L    Troponin-I, QT 0.06 (H) 0.0 - 0.045 NG/ML   COVID-19 RAPID TEST    Collection Time: 05/21/21 12:51 PM   Result Value Ref Range    Specimen source Nasopharyngeal      COVID-19 rapid test Not detected NOTD             Recent Labs     05/21/21  0655   HCO3I 22.6   PCO2I 35.9   PHI 7.41   PO2I 69*       All Micro Results     Procedure Component Value Units Date/Time    COVID-19 RAPID TEST [[id number]] Collected: 05/21/21 1251    Order Status: Completed Specimen: Nasopharyngeal Updated: 05/21/21 1343     Specimen source Nasopharyngeal        COVID-19 rapid test Not detected        Comment: Rapid Abbott ID Now       Rapid NAAT:  The specimen is NEGATIVE for SARS-CoV-2, the novel coronavirus associated with COVID-19. Negative results should be treated as presumptive and, if inconsistent with clinical signs and symptoms or necessary for patient management, should be tested with an alternative molecular assay. Negative results do not preclude SARS-CoV-2 infection and should not be used as the sole basis for patient management decisions. This test has been authorized by the FDA under an Emergency Use Authorization (EUA) for use by authorized laboratories.    Fact sheet for Healthcare Providers: ConventionUpdate.co.nz  Fact sheet for Patients: ConventionUpdate.co.nz       Methodology: Isothermal Nucleic Acid Amplification                 Imaging:  [x]I have personally reviewed the patients chest radiographs images and report with the patient  Results from Hospital Encounter encounter on 05/21/21    XR FOOT LT MIN 3 V    Narrative  EXAM: LEFT FOOT RADIOGRAPHS    CLINICAL INDICATION/HISTORY: foot pain and swelling  -Additional: None    COMPARISON: 5/19/2021    TECHNIQUE: 3 views of the left foot    _______________    FINDINGS:    BONES: No evidence of acute fracture or dislocation. Joint spaces and alignment  are maintained. No aggressive appearing osseous lytic or blastic lesion. SOFT TISSUES: Soft tissue fullness about the forefoot.    _______________    Impression  No evidence of acute fracture or dislocation. Results from [place] encounter on 05/21/21    CTA CHEST W OR W WO CONT    Narrative  CT PA CHEST, PULMONARY ARTERIES  History: chest pain and shortness of breath, suspected pulmonary embolism. CT PA technique:    Serial axial helical thin section high bolus rate contrast enhanced CT performed  from above the diaphragm to just above the aortic arch with nonionic iodine  containing IV contrast.    Coronal and sagittal reformations were also performed from axial data to  optimize longitudinal assessment for embolus burden and confirm equivocal  findings on the axial images. Additional 3-D slab MIP reconstructions were performed. Dose reduction technique:  Automated exposure control, adjustment of the mAs and/or kVp according to the  patient 's size, and iterative reconstruction techniques were used. Specifics  for this study were placed by technologist staff into the patient's electronic  medical record. Approximate dose, if determined, reference air kerma:  Digital imaging and Communication in Medicine [DICOM] format image data are  available to nonaffiliated external healthcare facilities or entities on a  secure media free reciprocally searchable basis, with patient authorization, for  at least a 12 month period after this study. If not available when requested,  the health care system, which is responsible for storage archival and delivery,  should be contacted directly.     Macro Dose    Quality very good    No prior CT exams available. Comparison portable chest same date    CT findings:    Pulmonary Vasc. Tod Mora: There is no evident hypodense intraluminal thrombus, abrupt vascular cutoff,  definite eccentric wall thickening down to the level to suggest acute pulmonary  thromboembolism. Pulmonary trunk is normal caliber. Thoracic aorta is normal caliber. No evident mediastinal hematoma. Lungs:  Lungs are mildly hypoinflated. .    There is a spiculated bordered 1 cm soft tissue density pulmonary nodule in the  junction of apical and anterior segments right upper lobe. There are a 0.4 cm  peripheral pulmonary nodule in the upper posterior basal segment left lower lobe  and 0.6 cm nodule more caudally in the same subsegment. Tiny 0.3 cm peripheral  pulmonary nodule, perifissural lateral basal segment right lower lobe. Other  punctate fissural based nodules multifocally trim right lung probably small  pleural lymph nodes. Minimal curvilinear partial atelectasis or scarring,  superior lingula. Minimal dependent reticular partial atelectasis. No discrete consolidation, or nodular interstitial thickening. Central airways are patent. Pleura:  No definite mass  or pleural effusion. Mild prominence of subpleural fat and  paracardiac fat. Mediastinum:  No pericardial effusion. Mild cardiomegaly, with multichamber including left  heart enlargement. Few scattered normal sized mediastinal lymph nodes. No evident mediastinal or  hilar lymphadenopathy. Misc:  Large habitus. Multilevel spondylosis, confluent anterior longitudinal ligament ossification. No aggressive/destructive bony lesions are evident acute fracture      Upper abdomen:  Cholecystectomy clips, interlobar fissure. Small hiatal hernia. Prominent  perihilar fat    Impression  1. No CTPA evidence of acute pulmonary thromboembolism. Mild hypoinflation and  dependent partial atelectasis. 2.  No prior chest CT. Spiculated 1 cm pulmonary nodule, junction of apical and  anterior segment right upper lobe, most likely small lung cancer. Other small  and indeterminate pulmonary nodules and probable fissural-based pleural lymph  nodes. 3. Cardiomegaly. Large habitus. Spondylosis.  Cholecystectomy         [x]See my orders for details          Lorna Kruse MD

## 2021-05-21 NOTE — ED PROVIDER NOTES
EMERGENCY DEPARTMENT HISTORY AND PHYSICAL EXAM    Date: 5/21/2021  Patient Name: Charli Mcnulty    History of Presenting Illness     Chief Complaint   Patient presents with    Shortness of Breath         History Provided By: Patient    Additional History (Context):   5:47 AM  Charli Mcnulty is a 80 y.o. female with PMHX of asthma, reflux, arthritis, strokelike symptoms well with no deficit, chronic pain, depression anxiety, hypertension, IBS, morbid obesity who presents to the emergency department C/O dyspnea. Patient comes in whispering front of voice saying she feels as if she cannot breathe. She has no signs or evidence of respiratory difficulty she is speaking in 12-15 word sentences with a heart rate of 56 and a respiratory rate of 16 and O2 sats of 100% on room air. She been seen multiple times for various evaluations for DVT all of which have been coming back unremarkable. She is on antibiotics for warm to the lower extremity but otherwise doing well. Social History  Denies smoking drinking or drugs      Family History  Positive family history for high blood pressure some family members with diabetes but no direct family members      PCP: Juan Daniel Holder, DO    Current Outpatient Medications   Medication Sig Dispense Refill    levoFLOXacin (Levaquin) 500 mg tablet Take 1 Tablet by mouth daily for 10 days. 10 Tablet 0    oxyCODONE-acetaminophen (Percocet) 5-325 mg per tablet Take 1 Tablet by mouth every four (4) hours as needed for Pain for up to 7 days. Max Daily Amount: 6 Tablets. Take 1 tablet every 4-6 hours as needed for pain control. If you were instructed to try over the counter ibuprofen or tylenol, only take the percocet for pain not controlled with the over the counter medication. 20 Tablet 0    colchicine (COLCRYS) 0.6 mg tablet Take 0.6 mg by mouth as needed.  meclizine (ANTIVERT) 12.5 mg tablet Take 12.5 mg by mouth three (3) times daily as needed.       olmesartan-hydroCHLOROthiazide (BENICAR HCT) 20-12.5 mg per tablet Take 1 Tab by mouth daily.  folic acid/multivit-min/lutein (CENTRUM SILVER PO) Take  by mouth daily.  Dexlansoprazole (DEXILANT) 60 mg CpDB Take 60 mg by mouth as needed.  potassium chloride (KLOR-CON) 20 mEq packet Take 20 mEq by mouth daily.  dipyridamole-aspirin (AGGRENOX) 200-25 mg per SR capsule Take 1 Cap by mouth daily.  calcium carbonate (TUMS) 200 mg calcium (500 mg) Chew Take 1 Tab by mouth as needed.            Past History     Past Medical History:  Past Medical History:   Diagnosis Date    Arthritis     osteo    Asthma     inhaler as needed - every 2 to 3 years    Chronic pain     knee and hip ,shoulder     GERD (gastroesophageal reflux disease)     Hypertension     5yrs    Irritable bowel     Other ill-defined conditions(799.89)     h/o migraines    Other ill-defined conditions(799.89)     h/o dizzy    Other ill-defined conditions(799.89)     IBS; hiatal hernia    Psychiatric disorder     depression    Stroke ([facility] [address] 9 years ago     stroke - no deficits    Stroke ([facility] [address]     In 1997/98       Past Surgical History:  Past Surgical History:   Procedure Laterality Date    HX CHOLECYSTECTOMY      HX HERNIA REPAIR      umbilical twice    HX HYSTERECTOMY      HX INTRAOCULAR LENS PROSTHESIS      HX KNEE ARTHROSCOPY      left    HX KNEE REPLACEMENT Left 2013    HX OTHER SURGICAL      herniated disc surgery    HX TUBAL LIGATION      LAP UMBILICAL HERNIA REPAIR      NEUROLOGICAL PROCEDURE UNLISTED      lower back    VT CARDIAC SURG PROCEDURE UNLIST      VT LAP,CHOLECYSTECTOMY         Family History:  Family History   Problem Relation Age of Onset    Cancer Father     Malignant Hyperthermia Neg Hx     Pseudocholinesterase Deficiency Neg Hx     Post-op Nausea/Vomiting Neg Hx     Delayed Awakening Neg Hx     Emergence Delirium Neg Hx     Post-op Cognitive Dysfunction Neg Hx     Other Neg Hx Social History:  Social History     Tobacco Use    Smoking status: Never Smoker    Smokeless tobacco: Never Used   Substance Use Topics    Alcohol use: No    Drug use: No       Allergies:  No Known Allergies      Review of Systems   Review of Systems   Respiratory: Positive for shortness of breath. Negative for cough and wheezing. Cardiovascular: Positive for palpitations. All other systems reviewed and are negative. Physical Exam     Vitals:    05/21/21 0547 05/21/21 0715 05/21/21 0745 05/21/21 0800   BP:  (!) 124/58 (!) 128/50 (!) 130/41   Pulse:  (!) 49 (!) 43 (!) 42   Resp:  27 18 19   Temp:       SpO2: 99% 100% 98% 100%   Weight:       Height:         Physical Exam  Vitals and nursing note reviewed. Constitutional:       General: She is not in acute distress. Appearance: She is well-developed. She is not diaphoretic. HENT:      Head: Normocephalic and atraumatic. Eyes:      General: No scleral icterus. Extraocular Movements:      Right eye: Normal extraocular motion. Left eye: Normal extraocular motion. Conjunctiva/sclera: Conjunctivae normal.      Pupils: Pupils are equal, round, and reactive to light. Neck:      Trachea: No tracheal deviation. Cardiovascular:      Rate and Rhythm: Normal rate and regular rhythm. Heart sounds: Normal heart sounds. Pulmonary:      Effort: Pulmonary effort is normal. No respiratory distress. Breath sounds: Normal breath sounds. No stridor. Abdominal:      General: Bowel sounds are normal. There is no distension. Palpations: Abdomen is soft. Tenderness: There is no abdominal tenderness. There is no rebound. Musculoskeletal:         General: No tenderness. Normal range of motion. Cervical back: Normal range of motion and neck supple. Comments: Grossly unremarkable without abnormalities   Feet:      Comments: Mild one to the left foot without any visible erythema or edema.   Skin:     General: Skin is warm and dry. Capillary Refill: Capillary refill takes less than 2 seconds. Findings: No erythema or rash. Neurological:      Mental Status: She is alert and oriented to person, place, and time. GCS: GCS eye subscore is 4. GCS verbal subscore is 5. GCS motor subscore is 6. Cranial Nerves: No cranial nerve deficit. Motor: Motor function is intact. No weakness. Coordination: Coordination is intact. Psychiatric:         Mood and Affect: Mood normal.         Behavior: Behavior normal.         Thought Content:  Thought content normal.         Judgment: Judgment normal.         Diagnostic Study Results     Labs -  Recent Results (from the past 24 hour(s))   EKG, 12 LEAD, INITIAL    Collection Time: 05/21/21  5:25 AM   Result Value Ref Range    Ventricular Rate 57 BPM    Atrial Rate 57 BPM    P-R Interval 176 ms    QRS Duration 84 ms    Q-T Interval 428 ms    QTC Calculation (Bezet) 416 ms    Calculated P Axis 40 degrees    Calculated R Axis -23 degrees    Calculated T Axis 43 degrees    Diagnosis       Sinus bradycardia  Voltage criteria for left ventricular hypertrophy  Abnormal ECG  When compared with ECG of 06-JUL-2020 11:23,  No significant change was found     NT-PRO BNP    Collection Time: 05/21/21  6:05 AM   Result Value Ref Range    NT pro- 0 - 1,800 PG/ML   CARDIAC PANEL,(CK, CKMB & TROPONIN)    Collection Time: 05/21/21  6:05 AM   Result Value Ref Range    CK - MB <1.0 <3.6 ng/ml    CK-MB Index  0.0 - 4.0 %     CALCULATION NOT PERFORMED WHEN RESULT IS BELOW LINEAR LIMIT     26 - 192 U/L    Troponin-I, QT 0.05 (H) 0.0 - 0.045 NG/ML   CBC WITH AUTOMATED DIFF    Collection Time: 05/21/21  6:05 AM   Result Value Ref Range    WBC 11.9 4.6 - 13.2 K/uL    RBC 3.62 (L) 4.20 - 5.30 M/uL    HGB 11.0 (L) 12.0 - 16.0 g/dL    HCT 34.9 (L) 35.0 - 45.0 %    MCV 96.4 74.0 - 97.0 FL    MCH 30.4 24.0 - 34.0 PG    MCHC 31.5 31.0 - 37.0 g/dL    RDW 14.2 11.6 - 14.5 %    PLATELET 164 070 - 420 K/uL    MPV 9.9 9.2 - 11.8 FL    NEUTROPHILS 71 40 - 73 %    LYMPHOCYTES 17 (L) 21 - 52 %    MONOCYTES 10 3 - 10 %    EOSINOPHILS 1 0 - 5 %    BASOPHILS 0 0 - 2 %    ABS. NEUTROPHILS 8.4 (H) 1.8 - 8.0 K/UL    ABS. LYMPHOCYTES 2.0 0.9 - 3.6 K/UL    ABS. MONOCYTES 1.2 0.05 - 1.2 K/UL    ABS. EOSINOPHILS 0.1 0.0 - 0.4 K/UL    ABS. BASOPHILS 0.1 0.0 - 0.1 K/UL    DF AUTOMATED     METABOLIC PANEL, COMPREHENSIVE    Collection Time: 05/21/21  6:05 AM   Result Value Ref Range    Sodium 140 136 - 145 mmol/L    Potassium 4.2 3.5 - 5.5 mmol/L    Chloride 107 100 - 111 mmol/L    CO2 26 21 - 32 mmol/L    Anion gap 7 3.0 - 18 mmol/L    Glucose 97 74 - 99 mg/dL    BUN 17 7.0 - 18 MG/DL    Creatinine 1.07 0.6 - 1.3 MG/DL    BUN/Creatinine ratio 16 12 - 20      GFR est AA 59 (L) >60 ml/min/1.73m2    GFR est non-AA 49 (L) >60 ml/min/1.73m2    Calcium 8.5 8.5 - 10.1 MG/DL    Bilirubin, total 0.6 0.2 - 1.0 MG/DL    ALT (SGPT) 25 13 - 56 U/L    AST (SGOT) 17 10 - 38 U/L    Alk.  phosphatase 74 45 - 117 U/L    Protein, total 7.0 6.4 - 8.2 g/dL    Albumin 3.2 (L) 3.4 - 5.0 g/dL    Globulin 3.8 2.0 - 4.0 g/dL    A-G Ratio 0.8 0.8 - 1.7     MAGNESIUM    Collection Time: 05/21/21  6:05 AM   Result Value Ref Range    Magnesium 1.8 1.6 - 2.6 mg/dL   BLOOD GAS, ARTERIAL POC    Collection Time: 05/21/21  6:55 AM   Result Value Ref Range    Device: ROOM AIR      pH (POC) 7.41 7.35 - 7.45      pCO2 (POC) 35.9 35.0 - 45.0 MMHG    pO2 (POC) 69 (L) 80 - 100 MMHG    HCO3 (POC) 22.6 22 - 26 MMOL/L    sO2 (POC) 93.7 92 - 97 %    Base deficit (POC) 1.7 mmol/L    Set Rate 26 bpm    Allens test (POC) NOT APPLICABLE      Site RIGHT RADIAL      Specimen type (POC) ARTERIAL      Performed by Malcom López    EKG, 12 LEAD, SUBSEQUENT    Collection Time: 05/21/21  9:13 AM   Result Value Ref Range    Ventricular Rate 58 BPM    Atrial Rate 58 BPM    P-R Interval 206 ms    QRS Duration 96 ms    Q-T Interval 432 ms    QTC Calculation (Bezet) 424 ms    Calculated P Axis 54 degrees    Calculated R Axis -19 degrees    Calculated T Axis 38 degrees    Diagnosis       Sinus bradycardia  Moderate voltage criteria for LVH, may be normal variant  Borderline ECG  When compared with ECG of 21-MAY-2021 05:25,  No significant change was found          Radiologic Studies -   XR CHEST PORT   Final Result      No acute cardiopulmonary abnormality. CTA CHEST W OR W WO CONT    (Results Pending)     CT Results  (Last 48 hours)    None        CXR Results  (Last 48 hours)               05/21/21 0555  XR CHEST PORT Final result    Impression:      No acute cardiopulmonary abnormality. Narrative:  EXAM: XR CHEST PORT       CLINICAL INDICATION/HISTORY: SOB   -Additional: None       COMPARISON: 7/6/2020       TECHNIQUE: Portable frontal view of the chest       _______________       FINDINGS:       SUPPORT DEVICES: None. HEART AND MEDIASTINUM: Cardiomediastinal silhouette within normal limits. LUNGS AND PLEURAL SPACES: No dense consolidation, large effusion or   pneumothorax.       _______________                 Medications given in the ED-  Medications   iopamidoL (ISOVUE-370) 76 % injection 100 mL (100 mL IntraVENous Given 5/21/21 0826)         Medical Decision Making   I am the first provider for this patient. I reviewed the vital signs, available nursing notes, past medical history, past surgical history, family history and social history. Vital Signs-Reviewed the patient's vital signs.     Pulse Oximetry Analysis   100% on room air    Cardiac Monitor:  Rate: Fifty-two bpm  Rhythm: Sinus bradycardia    EKG interpretation: (Preliminary)  5:25 AM   Sinus bradycardia rate fifty-seven, normal PA interval one seventy-six, normal QRS, QTC is four sixteen, nonspecific ST changes with borderline LVH criteria  EKG read by Katerina Segura MD     Repeat EKG interpretation: (Preliminary)  EKG read by Dr. Lopez Quinones at 9:21 AM  Sinus bradycardia at a rate of 58 bpm, PA interval 206 ms, QRS duration of 96 ms, unchanged when compared to prior from earlier this morning    Records Reviewed: NURSING NOTES AND PREVIOUS MEDICAL RECORDS    Provider Notes (Medical Decision Making):   Patient with sleep apnea-like symptoms as well as complains of shortness of breath. She has had multiple echoes and studies before the past.  Reviewed her previous heart cardiac catheter which showed essentially no intraluminal irregularities. The study was done two and half years ago and available in record review. She also had echo same time which showed a EF of 60 to 65%. Chest x-ray does not show us any significant evidence of edema or pneumonia. Patient is having a CT scan to evaluate for possibility of other process including more of a cryptogenic pneumonia or pulmonary embolism. Troponin was just borderline elevated at 0.5. Due to the abnormality of elevated troponin as well as bradycardia and the complaints of dyspnea Case was reviewed with cardiology who mentions does not look to be a clear definitive reason for the patient be admitted given her studies. He agreed with completing the CTA to evaluate for pulmonary embolism. As long as troponins do not trend abnormally and CT is unremarkable she can be discharged with outpatient follow-up. Procedures:  Procedures    ED Course:   8:47 AM: Initial assessment performed. The patients presenting problems have been discussed, and they are in agreement with the care plan formulated and outlined with them. I have encouraged them to ask questions as they arise throughout their visit. CONSULT NOTE:   9:16 AM  Stephanie Jones MD   spoke with Dr. Riley Hitchcock   Specialty: Cardiology  Discussed pt's hx, disposition, and available diagnostic and imaging results  over the telephone. Reviewed care plans. Consulting physician agrees with plans as outlined.   We discussed the case with cardiology regarding symptoms of dyspnea as well as markedly elevated troponins. Started patient treated as outpatient if troponins do not markings patient is feeling comfortable. SIGN OUT:  9:17 AM  Patient's presentation, labs/imaging and plan of care was reviewed with Dr. Mimi Guillermo as part of sign out. They will follow cardiac enzymes and CTA as part of the plan discussed with the patient.     Dr. oYn Maciel's assistance in completion of this plan is greatly appreciated but it should be noted that I will be the provider of record for this patient.     Dereck Moreland MD     CONSULT NOTE:   10:33 AM  Dr. Mimi Guillermo spoke with Dr. Carlee Wagoner   Specialty: Cardiology  Discussed pt's hx, disposition, and available diagnostic and imaging results over the telephone. Reviewed care plans. If still symptomatic, can admit for stress test.  If feeling better can follow up as outpatient. 10:35 AM  Patient reports she does not feel any better. Will discuss with hospitalist for further in-hospital management. CONSULT NOTE:   11:31 AM  Dr. Mimi Guillermo spoke with Dr. Komal Easton   Specialty: Hospitalist  Discussed pt's hx, disposition, and available diagnostic and imaging results over the telephone. Reviewed care plans. Accepts to telemetry. Diagnosis and Disposition     CRITICAL CARE NOTE:  10:23 AM  I have spent 45 minutes of critical care time involved in lab review, consultations with specialist, family decision-making, and documentation. During this entire length of time I was immediately available to the patient. Critical Care: The reason for providing this level of medical care for this critically ill patient was due a critical illness that impaired one or more vital organ systems such that there was a high probability of imminent or life threatening deterioration in the patients condition.  This care involved high complexity decision making to assess, manipulate, and support vital system functions, to treat this degreee vital organ system failure and to prevent further life threatening deterioration of the patients condition. Core Measures:  For Hospitalized Patients:    1. Hospitalization Decision Time:  The decision to hospitalize the patient was made by Dr. Samia Gallego at 10:35 AM on 5/21/2021    2. Aspirin: Aspirin was not given because the patient did not present with a stroke at the time of their Emergency Department evaluation    10:36 AM  Patient is being admitted to the hospital by Dr. Chio Lynch. The results of their tests and reasons for their admission have been discussed with them and/or available family. They convey agreement and understanding for the need to be admitted and for their admission diagnosis. CONDITIONS ON ADMISSION:  Sepsis is not present at the time of admission. Deep Vein Thrombosis is not present at the time of admission. Thrombosis is not present at the time of admission. Urinary Tract Infection is not present at the time of admission. Pneumonia is not present at the time of admission. MRSA is not present at the time of admission. Wound infection is not present at the time of admission. Pressure Ulcer is not present at the time of admission. CLINICAL IMPRESSION:    1. Dyspnea, unspecified type    2. Bradycardia    3. Lung nodule      _______________________________    This note was partially transcribed via voice recognition software. Although efforts have been made to catch any discrepancies, it may contain sound alike words, grammatical errors, or nonsensical words.

## 2021-05-21 NOTE — ED NOTES
TRANSFER - OUT REPORT:    Verbal report given to [facility] RN (name) on Trixie Aguilera  being transferred to [facility] (unit) for routine progression of care       Report consisted of patients Situation, Background, Assessment and   Recommendations(SBAR). Information from the following report(s) SBAR, ED Summary, MAR, Recent Results and Cardiac Rhythm Sinus Caryl Cordoba was reviewed with the receiving nurse. Lines:   Peripheral IV 05/21/21 Right Antecubital (Active)   Site Assessment Clean, dry, & intact 05/21/21 0640   Phlebitis Assessment 0 05/21/21 0640   Infiltration Assessment 0 05/21/21 0640   Dressing Status Clean, dry, & intact 05/21/21 0640   Dressing Type Transparent 05/21/21 0640   Hub Color/Line Status Green 05/21/21 0640        Opportunity for questions and clarification was provided.       Patient transported with:   Registered Nurse

## 2021-05-21 NOTE — PROGRESS NOTES
1540 : received report from [address] [place] [address] Avenue : TRANSFER - IN REPORT:    Verbal report received from Declan Reis RN(name) on Dunia Lara  being received from ED(unit) for routine progression of care      Report consisted of patients Situation, Background, Assessment and   Recommendations(SBAR). Information from the following report(s) SBAR, Kardex, ED Summary, Intake/Output and MAR was reviewed with the receiving nurse. Opportunity for questions and clarification was provided. Assessment completed upon patients arrival to unit and care assumed. Patient oriented to room and floor procedures and policies. Pt was very tired and wanted to sleep. Stated We could do the admission assessment later. Call bell and room phone within reach. Bed low and locked. 1845 : during admission assessment pt stated she brought her Percocet and Levaquin from home. Medications brought and kept in pharmacy safe. Pt stated daughter will be willing to take them home. 1925 : Bedside shift change report given to Boris Brower (oncoming nurse) by Denise Barajas (offgoing nurse). Report included the following information SBAR, Kardex, Intake/Output and MAR.

## 2021-05-21 NOTE — CONSULTS
[facility] Consult Note      Patient: Angélica Raya MRN: [id number]  SSN: [id number]    YOB: 1935  Age: 80 y.o. Sex: female          Date of Consultation: 5/21/2021  Referring Physician: Dr. Rosalino Morales  Reason for Consultation: Shortness of breath, chest pain, minimal troponin elevation    HPI:  I was asked by  for shortness of breath, chest pain, minimal troponin elevation, left ankle swelling. Angélica Raya is a 80-year-old obese patient came to the hospital with multiple symptoms including left ankle swelling, right-sided chest pain shortness of breath CTA is negative for pulmonary embolism have minimal troponin elevation of 0.06. Patient has stable coronary angiogram in 2018. Patient is admitted in the hospital for further management she is also receiving antibiotic for possible cellulitis. She denied any orthopnea, PND or bilateral leg swelling.               Past Medical History:   Diagnosis Date    Arthritis     osteo    Asthma     inhaler as needed - every 2 to 3 years    Chronic pain     knee and hip ,shoulder     GERD (gastroesophageal reflux disease)     Hypertension     5yrs    Irritable bowel     Other ill-defined conditions(799.89)     h/o migraines    Other ill-defined conditions(799.89)     h/o dizzy    Other ill-defined conditions(799.89)     IBS; hiatal hernia    Psychiatric disorder     depression    Stroke ([address] 9 years ago     stroke - no deficits    Stroke ([address]     In 1997/98     Past Surgical History:   Procedure Laterality Date    HX CHOLECYSTECTOMY      HX HERNIA REPAIR      umbilical twice    HX HYSTERECTOMY      HX INTRAOCULAR LENS PROSTHESIS      HX KNEE ARTHROSCOPY      left    HX KNEE REPLACEMENT Left 2013    HX OTHER SURGICAL      herniated disc surgery    HX TUBAL LIGATION      LAP UMBILICAL HERNIA REPAIR      NEUROLOGICAL PROCEDURE UNLISTED      lower back    MI CARDIAC SURG PROCEDURE UNLIST      MI LAP,CHOLECYSTECTOMY       Current Facility-Administered Medications   Medication Dose Route Frequency    aspirin chewable tablet 81 mg  81 mg Oral DAILY    nitroglycerin (NITROSTAT) tablet 0.4 mg  0.4 mg SubLINGual Q5MIN PRN    acetaminophen (TYLENOL) tablet 650 mg  650 mg Oral Q4H PRN    morphine injection 1 mg  1 mg IntraVENous Q4H PRN    heparin (porcine) injection 5,000 Units  5,000 Units SubCUTAneous Q8H    oxyCODONE-acetaminophen (PERCOCET) 5-325 mg per tablet 1 Tablet  1 Tablet Oral Q4H PRN    [START ON 5/22/2021] pantoprazole (PROTONIX) tablet 40 mg  40 mg Oral ACB    cefTRIAXone (ROCEPHIN) 1 g in sterile water (preservative free) 10 mL IV syringe  1 g IntraVENous Q24H    Saccharomyces boulardii (FLORASTOR) capsule 500 mg  500 mg Oral BID    [START ON 5/22/2021] olmesartan (BENICAR) tablet 20 mg  20 mg Oral DAILY    And    [START ON 5/22/2021] hydroCHLOROthiazide (MICROZIDE) capsule 12.5 mg  12.5 mg Oral DAILY    vancomycin (VANCOCIN) 2000 mg in  ml infusion  2,000 mg IntraVENous ONCE    Vancomycin-Pharmacy to Dose  1 Each Other Rx Dosing/Monitoring    [START ON 5/22/2021] vancomycin (VANCOCIN) 1,000 mg in 0.9% sodium chloride 250 mL (VIAL-MATE)  1,000 mg IntraVENous Q24H       Allergies and Intolerances:   No Known Allergies    Family History:   Family History   Problem Relation Age of Onset    Cancer Father     Malignant Hyperthermia Neg Hx     Pseudocholinesterase Deficiency Neg Hx     Post-op Nausea/Vomiting Neg Hx     Delayed Awakening Neg Hx     Emergence Delirium Neg Hx     Post-op Cognitive Dysfunction Neg Hx     Other Neg Hx        Social History:   She  reports that she has never smoked. She has never used smokeless tobacco.  She  reports no history of alcohol use. Review of Systems:     Review of Systems  Gen: No fever, chills, malaise, weight loss/gain. Heent: No headache, rhinorrhea, epistaxis, ear pain, hearing loss, sinus pain, neck pain/stiffness, sore throat.    Heart: + chest pain, palpitations, +MACIAS, pnd, or orthopnea. Resp: No cough, hemoptysis, wheezing and shortness of breath. GI: No nausea, vomiting, diarrhea, constipation, melena or hematochezia. : No urinary obstruction, dysuria or hematuria. Derm: No rash, new skin lesion or pruritis. Musc/skeletal: no bone or joint complains. Vasc: left ankle edema +, cyanosis or claudication. Endo: No heat/cold intolerance, no polyuria,polydipsia or polyphagia. Neuro: No unilateral weakness, numbness, tingling. No seizures. Heme: No easy bruising or bleeding. Physical:   Patient Vitals for the past 6 hrs:   Temp Pulse Resp BP SpO2   05/21/21 1645 (P) 98.9 °F (37.2 °C)       05/21/21 1632 98.6 °F (37 °C)       05/21/21 1300  71 23 (!) 142/53 100 %         Exam:   General Appearance: Comfortable, not using accessory muscles of respiration. HEENT: KAREN. HEAD: Atraumatic  NECK: No JVD, no thyroidomeglay. LUNGS: Clear bilaterally. HEART: S1+S2     ABD: Non-tender, BS Audible    EXT: No edema, and no cysnosis. VASCULAR EXAM: Pulses are intact. PSYCHIATRIC EXAM: Mood is appropriate. MUSCULOSKELETAL: Grossly no joint deformity. NEUROLOGICAL: Motor and sensory sytem intact and Cranial nerves II-XII intact.     Review of Data:   LABS:   Lab Results   Component Value Date/Time    WBC 11.9 05/21/2021 06:05 AM    HGB 11.0 (L) 05/21/2021 06:05 AM    HCT 34.9 (L) 05/21/2021 06:05 AM    PLATELET 915 54/90/4688 06:05 AM     Lab Results   Component Value Date/Time    Sodium 140 05/21/2021 06:05 AM    Potassium 4.2 05/21/2021 06:05 AM    Chloride 107 05/21/2021 06:05 AM    CO2 26 05/21/2021 06:05 AM    Glucose 97 05/21/2021 06:05 AM    BUN 17 05/21/2021 06:05 AM    Creatinine 1.07 05/21/2021 06:05 AM     Lab Results   Component Value Date/Time    Cholesterol, total 114 06/10/2013 06:15 AM    HDL Cholesterol 36 (L) 06/10/2013 06:15 AM    LDL, calculated 54.8 06/10/2013 06:15 AM    Triglyceride 116 06/10/2013 06:15 AM No components found for: GPT  No results found for: HBA1C, HGBE8, DPX2UXOK, MNB1MERQ    RADIOLOGY:  CT Results  (Last 48 hours)               05/21/21 0850  CTA CHEST W OR W WO CONT Final result    Impression:      1. No CTPA evidence of acute pulmonary thromboembolism. Mild hypoinflation and   dependent partial atelectasis. 2.  No prior chest CT. Spiculated 1 cm pulmonary nodule, junction of apical and   anterior segment right upper lobe, most likely small lung cancer. Other small   and indeterminate pulmonary nodules and probable fissural-based pleural lymph   nodes. 3. Cardiomegaly. Large habitus. Spondylosis. Cholecystectomy       Narrative:  CT PA CHEST, PULMONARY ARTERIES    History: chest pain and shortness of breath, suspected pulmonary embolism. CT PA technique:        Serial axial helical thin section high bolus rate contrast enhanced CT performed   from above the diaphragm to just above the aortic arch with nonionic iodine   containing IV contrast.         Coronal and sagittal reformations were also performed from axial data to   optimize longitudinal assessment for embolus burden and confirm equivocal   findings on the axial images. Additional 3-D slab MIP reconstructions were performed. Dose reduction technique:    Automated exposure control, adjustment of the mAs and/or kVp according to the   patient 's size, and iterative reconstruction techniques were used. Specifics   for this study were placed by technologist staff into the patient's electronic   medical record. Approximate dose, if determined, reference air kerma:   Digital imaging and Communication in Medicine [DICOM] format image data are   available to nonaffiliated external healthcare facilities or entities on a   secure media free reciprocally searchable basis, with patient authorization, for   at least a 12 month period after this study.  If not available when requested,   the health care system, which is responsible for storage archival and delivery,   should be contacted directly. Macro Dose       Quality very good       No prior CT exams available. Comparison portable chest same date       CT findings:       Pulmonary Vasc. Donovan Lennox: There is no evident hypodense intraluminal thrombus, abrupt vascular cutoff,   definite eccentric wall thickening down to the level to suggest acute pulmonary   thromboembolism. Pulmonary trunk is normal caliber. Thoracic aorta is normal caliber. No evident mediastinal hematoma. Lungs:   Lungs are mildly hypoinflated. .        There is a spiculated bordered 1 cm soft tissue density pulmonary nodule in the   junction of apical and anterior segments right upper lobe. There are a 0.4 cm   peripheral pulmonary nodule in the upper posterior basal segment left lower lobe   and 0.6 cm nodule more caudally in the same subsegment. Tiny 0.3 cm peripheral   pulmonary nodule, perifissural lateral basal segment right lower lobe. Other   punctate fissural based nodules multifocally trim right lung probably small   pleural lymph nodes. Minimal curvilinear partial atelectasis or scarring,   superior lingula. Minimal dependent reticular partial atelectasis. No discrete consolidation, or nodular interstitial thickening. Central airways are patent. Pleura:   No definite mass  or pleural effusion. Mild prominence of subpleural fat and   paracardiac fat. Mediastinum:   No pericardial effusion. Mild cardiomegaly, with multichamber including left   heart enlargement. Few scattered normal sized mediastinal lymph nodes. No evident mediastinal or   hilar lymphadenopathy. Misc:   Large habitus. Multilevel spondylosis, confluent anterior longitudinal ligament ossification. No aggressive/destructive bony lesions are evident acute fracture           Upper abdomen:   Cholecystectomy clips, interlobar fissure. Small hiatal hernia.  Prominent   perihilar fat                           CXR Results  (Last 48 hours)               21 0555  XR CHEST PORT Final result    Impression:      No acute cardiopulmonary abnormality. Narrative:  EXAM: XR CHEST PORT       CLINICAL INDICATION/HISTORY: SOB   -Additional: None       COMPARISON: 2020       TECHNIQUE: Portable frontal view of the chest       _______________       FINDINGS:       SUPPORT DEVICES: None. HEART AND MEDIASTINUM: Cardiomediastinal silhouette within normal limits.        LUNGS AND PLEURAL SPACES: No dense consolidation, large effusion or   pneumothorax.       _______________                   Cardiology Procedures:   Results for orders placed or performed during the hospital encounter of 21   EKG, 12 LEAD, INITIAL   Result Value Ref Range    Ventricular Rate 57 BPM    Atrial Rate 57 BPM    P-R Interval 176 ms    QRS Duration 84 ms    Q-T Interval 428 ms    QTC Calculation (Bezet) 416 ms    Calculated P Axis 40 degrees    Calculated R Axis -23 degrees    Calculated T Axis 43 degrees    Diagnosis       Sinus bradycardia  Voltage criteria for left ventricular hypertrophy  Abnormal ECG  When compared with ECG of 2020 11:23,  No significant change was found     Results for orders placed or performed during the hospital encounter of 04/29/15   ECG HOLTER MONITOR, UP TO 48 HRS    Narrative                               Holter Monitoring Report                           [facility]                      [address] [address]                                         Test Date:    2015  Nunu Gonzalez Name:     Phan Pinto           Department:     Patient ID:   [id number]                Room:           Gender:       FEMALE                   Technician:   Adeel Rodriguez  :                         Requested By:  Primitivo Hughes MD  Order Number:                          Reading MD:   Primitivo Hughes MD                             Interpretive Statements  Gabriel Rosenberg was in [place] [address]. The average heart rate, excluding ectopy, was 65 BPM with a minimum of 39 BPM  at  08:11D3 and a maximum of 122 BPM at   15:02D1. Heart beats, including ectopy, totaled 122513 beats. VENTRICULAR ECTOPICS totaled 7  averaging  0.2 per hour  with 7 single, 0  paired, 0 trigeminy and 0 R on T.    SUPRAVENTRICULAR ECTOPICS totaled 15  ,with 5 single and 0 paired beats. SUPRAVENTRICULAR TACHYCARDIA occurred 2 times. The fastest run was at 120 BPM and occurred at 10:54D2 with 6 beats. The longest run was 6 beats at 10:54D2 at a rate of 120 BPM.  1. Rhythm is sinus. 2. Change in coduction beats noted. 3. MA normal, variable QRS. 4. (7) ve''s.  5. (5) single sve''s, (2) runs of svt. Electronically signed on 05-07-15 15:21:33 CDT by Anjel Ren MD      Echo Results  (Last 48 hours)    None       Cardiolite (Tc-99m Sestamibi) stress test        Impression / Plan:    Patient Active Problem List   Diagnosis Code    Atypical chest pain R07.89    Other and unspecified ovarian cyst N83.209    Endometrial hyperplasia N85.00    Chest pain R07.9    Cerebrovascular disease I67.9    HTN (hypertension) I10    Morbid obesity ([address] E66.01    Laryngitis J04.0    Acute upper respiratory infection J06.9    MACIAS (dyspnea on exertion) R06.00    Palpitation R00.2    H/O total hip arthroplasty, left I74.112    Bradycardia R00.1    Lung nodule R91.1    Dyspnea R06.00    Cellulitis of left foot L03.116     Assessment and plan      Shortness of breath  Right-sided chest pain  Minimal left ankle swelling  Minimal troponin elevation  Morbid obesity  Hypertension        Plan  Continue with aspirin, antihypertensive treatment and antibiotic treatment as per hospitalist medicine. I will get echocardiogram and possible Lexiscan nuclear stress test on Monday. Discussed with patient.           Signed By: Shanon Rosario MD     May 21, 2021

## 2021-05-21 NOTE — ED TRIAGE NOTES
Patient arrived from home via EMS. C/O sudden onset SOB while going to restroom. 99% on RA easy work of breathing. Patient recently seen for swelling of left foot. Patient complaints of right side chest pain rates pain of 8. Neurovascular intact in left foot. A&O x 4.

## 2021-05-21 NOTE — PROGRESS NOTES
Pharmacy Dosing Services: Vancomycin    Consult for Vancomycin Dosing by Pharmacy by Dr. Devora Houser provided for this 80y.o. year old female , for indication of Skin and Soft Tissue Infection. Day of Therapy 01    Ht Readings from Last 1 Encounters:   05/21/21 160 cm (63\")        Wt Readings from Last 1 Encounters:   05/21/21 108 kg (238 lb)        Other Current Antibiotics Ceftriaxone 1g IV   Significant Cultures pending   Serum Creatinine Lab Results   Component Value Date/Time    Creatinine 1.07 05/21/2021 06:05 AM      Creatinine Clearance Estimated Creatinine Clearance: 44.4 mL/min (based on SCr of 1.07 mg/dL). BUN Lab Results   Component Value Date/Time    BUN 17 05/21/2021 06:05 AM      WBC Lab Results   Component Value Date/Time    WBC 11.9 05/21/2021 06:05 AM      H/H Lab Results   Component Value Date/Time    HGB 11.0 (L) 05/21/2021 06:05 AM      Platelets Lab Results   Component Value Date/Time    PLATELET 782 54/78/0647 06:05 AM      Temp 98.2 °F (36.8 °C)     Start Vancomycin therapy, with loading dose of 2000 mg IV once on 5/21/22 @1445. Followed with maintenance dose of Vancomycin 1000 mg IV q24h. (Duration x7days.)    Dose calculated to approximate a therapeutic trough of  10-15 mcg/mL. Pharmacy to follow daily and will make changes to dose and/or frequency based on clinical status. Pharmacist [facility] PharmD.   [phone]

## 2021-05-21 NOTE — PROGRESS NOTES
Problem: Falls - Risk of  Goal: *Absence of Falls  Description: Document Ruth Hoang Fall Risk and appropriate interventions in the flowsheet.   Outcome: Progressing Towards Goal  Note: Fall Risk Interventions:                 Elimination Interventions: Call light in reach

## 2021-05-22 LAB
ANION GAP SERPL CALC-SCNC: 7 MMOL/L (ref 3–18)
BASOPHILS # BLD: 0.1 K/UL (ref 0–0.1)
BASOPHILS NFR BLD: 1 % (ref 0–2)
BUN SERPL-MCNC: 15 MG/DL (ref 7–18)
BUN/CREAT SERPL: 16 (ref 12–20)
CALCIUM SERPL-MCNC: 7.9 MG/DL (ref 8.5–10.1)
CHLORIDE SERPL-SCNC: 110 MMOL/L (ref 100–111)
CHOLEST SERPL-MCNC: 122 MG/DL
CK MB CFR SERPL CALC: ABNORMAL % (ref 0–4)
CK MB SERPL-MCNC: <1 NG/ML (ref 5–25)
CK SERPL-CCNC: 135 U/L (ref 26–192)
CO2 SERPL-SCNC: 24 MMOL/L (ref 21–32)
CREAT SERPL-MCNC: 0.91 MG/DL (ref 0.6–1.3)
DIFFERENTIAL METHOD BLD: ABNORMAL
EOSINOPHIL # BLD: 0.2 K/UL (ref 0–0.4)
EOSINOPHIL NFR BLD: 2 % (ref 0–5)
ERYTHROCYTE [DISTWIDTH] IN BLOOD BY AUTOMATED COUNT: 13.7 % (ref 11.6–14.5)
GLUCOSE BLD STRIP.AUTO-MCNC: 108 MG/DL (ref 70–110)
GLUCOSE BLD STRIP.AUTO-MCNC: 113 MG/DL (ref 70–110)
GLUCOSE BLD STRIP.AUTO-MCNC: 125 MG/DL (ref 70–110)
GLUCOSE SERPL-MCNC: 85 MG/DL (ref 74–99)
HCT VFR BLD AUTO: 30.6 % (ref 35–45)
HDLC SERPL-MCNC: 46 MG/DL (ref 40–60)
HDLC SERPL: 2.7 {RATIO} (ref 0–5)
HGB BLD-MCNC: 10 G/DL (ref 12–16)
LDLC SERPL CALC-MCNC: 58.6 MG/DL (ref 0–100)
LIPID PROFILE,FLP: NORMAL
LYMPHOCYTES # BLD: 2.3 K/UL (ref 0.9–3.6)
LYMPHOCYTES NFR BLD: 22 % (ref 21–52)
MCH RBC QN AUTO: 30.3 PG (ref 24–34)
MCHC RBC AUTO-ENTMCNC: 32.7 G/DL (ref 31–37)
MCV RBC AUTO: 92.7 FL (ref 74–97)
MONOCYTES # BLD: 1.1 K/UL (ref 0.05–1.2)
MONOCYTES NFR BLD: 10 % (ref 3–10)
NEUTS SEG # BLD: 6.8 K/UL (ref 1.8–8)
NEUTS SEG NFR BLD: 66 % (ref 40–73)
PLATELET # BLD AUTO: 207 K/UL (ref 135–420)
PMV BLD AUTO: 11.1 FL (ref 9.2–11.8)
POTASSIUM SERPL-SCNC: 3.9 MMOL/L (ref 3.5–5.5)
RBC # BLD AUTO: 3.3 M/UL (ref 4.2–5.3)
SODIUM SERPL-SCNC: 141 MMOL/L (ref 136–145)
TRIGL SERPL-MCNC: 87 MG/DL (ref ?–150)
TROPONIN I SERPL-MCNC: 0.05 NG/ML (ref 0–0.04)
VLDLC SERPL CALC-MCNC: 17.4 MG/DL
WBC # BLD AUTO: 10.4 K/UL (ref 4.6–13.2)

## 2021-05-22 PROCEDURE — 96376 TX/PRO/DX INJ SAME DRUG ADON: CPT

## 2021-05-22 PROCEDURE — 82962 GLUCOSE BLOOD TEST: CPT

## 2021-05-22 PROCEDURE — 74011250636 HC RX REV CODE- 250/636: Performed by: HOSPITALIST

## 2021-05-22 PROCEDURE — 80061 LIPID PANEL: CPT

## 2021-05-22 PROCEDURE — 80048 BASIC METABOLIC PNL TOTAL CA: CPT

## 2021-05-22 PROCEDURE — 65660000000 HC RM CCU STEPDOWN

## 2021-05-22 PROCEDURE — 65390000012 HC CONDITION CODE 44 OBSERVATION

## 2021-05-22 PROCEDURE — 74011000250 HC RX REV CODE- 250: Performed by: INTERNAL MEDICINE

## 2021-05-22 PROCEDURE — 85025 COMPLETE CBC W/AUTO DIFF WBC: CPT

## 2021-05-22 PROCEDURE — 74011000250 HC RX REV CODE- 250: Performed by: HOSPITALIST

## 2021-05-22 PROCEDURE — 94640 AIRWAY INHALATION TREATMENT: CPT

## 2021-05-22 PROCEDURE — 82553 CREATINE MB FRACTION: CPT

## 2021-05-22 PROCEDURE — 96372 THER/PROPH/DIAG INJ SC/IM: CPT

## 2021-05-22 PROCEDURE — 94760 N-INVAS EAR/PLS OXIMETRY 1: CPT

## 2021-05-22 PROCEDURE — 74011250637 HC RX REV CODE- 250/637: Performed by: HOSPITALIST

## 2021-05-22 RX ORDER — COLCHICINE 0.6 MG/1
0.6 TABLET ORAL DAILY
Status: DISCONTINUED | OUTPATIENT
Start: 2021-05-22 | End: 2021-05-26 | Stop reason: HOSPADM

## 2021-05-22 RX ADMIN — OXYCODONE HYDROCHLORIDE AND ACETAMINOPHEN 1 TABLET: 5; 325 TABLET ORAL at 21:09

## 2021-05-22 RX ADMIN — IPRATROPIUM BROMIDE AND ALBUTEROL SULFATE 3 ML: .5; 3 SOLUTION RESPIRATORY (INHALATION) at 21:04

## 2021-05-22 RX ADMIN — Medication 500 MG: at 09:20

## 2021-05-22 RX ADMIN — WATER 1 G: 1 INJECTION INTRAMUSCULAR; INTRAVENOUS; SUBCUTANEOUS at 13:22

## 2021-05-22 RX ADMIN — VANCOMYCIN HYDROCHLORIDE 1000 MG: 1 INJECTION, POWDER, LYOPHILIZED, FOR SOLUTION INTRAVENOUS at 16:42

## 2021-05-22 RX ADMIN — IPRATROPIUM BROMIDE AND ALBUTEROL SULFATE 3 ML: .5; 3 SOLUTION RESPIRATORY (INHALATION) at 07:22

## 2021-05-22 RX ADMIN — OXYCODONE HYDROCHLORIDE AND ACETAMINOPHEN 1 TABLET: 5; 325 TABLET ORAL at 11:45

## 2021-05-22 RX ADMIN — HEPARIN SODIUM 5000 UNITS: 5000 INJECTION INTRAVENOUS; SUBCUTANEOUS at 11:45

## 2021-05-22 RX ADMIN — PANTOPRAZOLE SODIUM 40 MG: 40 TABLET, DELAYED RELEASE ORAL at 06:39

## 2021-05-22 RX ADMIN — IPRATROPIUM BROMIDE AND ALBUTEROL SULFATE 3 ML: .5; 3 SOLUTION RESPIRATORY (INHALATION) at 11:48

## 2021-05-22 RX ADMIN — IPRATROPIUM BROMIDE AND ALBUTEROL SULFATE 3 ML: .5; 3 SOLUTION RESPIRATORY (INHALATION) at 16:31

## 2021-05-22 RX ADMIN — HYDROCHLOROTHIAZIDE 12.5 MG: 12.5 CAPSULE ORAL at 09:19

## 2021-05-22 RX ADMIN — OXYCODONE HYDROCHLORIDE AND ACETAMINOPHEN 1 TABLET: 5; 325 TABLET ORAL at 05:14

## 2021-05-22 RX ADMIN — OLMESARTAN MEDOXOMIL 20 MG: 20 TABLET, FILM COATED ORAL at 09:19

## 2021-05-22 RX ADMIN — Medication 500 MG: at 21:09

## 2021-05-22 RX ADMIN — HEPARIN SODIUM 5000 UNITS: 5000 INJECTION INTRAVENOUS; SUBCUTANEOUS at 21:09

## 2021-05-22 RX ADMIN — HEPARIN SODIUM 5000 UNITS: 5000 INJECTION INTRAVENOUS; SUBCUTANEOUS at 05:14

## 2021-05-22 RX ADMIN — COLCHICINE 0.6 MG: 0.6 TABLET, FILM COATED ORAL at 13:23

## 2021-05-22 RX ADMIN — ASPIRIN 81 MG: 81 TABLET, CHEWABLE ORAL at 09:19

## 2021-05-22 NOTE — PROGRESS NOTES
Reason for Admission:  Left foot pain, chest pain                     RUR Score:          14%            Plan for utilizing home health:      TBD    PCP: First and Last name: Porter Guillermo DO     Name of Practice:    Are you a current patient: Yes/No:    Approximate date of last visit:    Can you participate in a virtual visit with your PCP:                     Current Advanced Directive/Advance Care Plan: Full Code      Healthcare Decision Maker:   Click here to complete [address] including selection of the Healthcare Decision Maker Relationship (ie \"Primary\")                             Transition of Care Plan:     Chart reviewed, met with pt at bedside. Introduced CM and role to pt, verified demographics via face sheet. Pt states she lives alone, uses rollator for ambulation, has daughter close by who does not drive. Pt states she has friend who will pick her up at discharge. Pt would like to be screened for Medicaid eligibility, email sent to [email or web address] to please follow up; pt aware they may either call or visit her room to screen her. No other needs at this time identified. Per cardiology note, stress test on Monday. CM continues to follow. Care Management Interventions  PCP Verified by CM:  Yes  Transition of Care Consult (CM Consult): Discharge Planning  Current Support Network: Lives Alone  Confirm Follow Up Transport: Friends  Discharge Location  Discharge Placement: Home with family assistance

## 2021-05-22 NOTE — PROGRESS NOTES
Hospitalist Progress Note-critical care note     Patient: Marlon Osorio MRN: [id number]  CSN: [id number]    YOB: 1935  Age: 80 y.o. Sex: female    DOA: 5/21/2021 LOS:  LOS: 1 day            Chief complaint: Lung nodule, dyspnea, cellulitis of left foot morbid obesity chest pain hypertension    Assessment/Plan         Hospital Problems  Date Reviewed: 4/29/2015        Codes Class Noted POA    Bradycardia ICD-10-CM: R00.1  ICD-9-CM: 427.89  5/21/2021 Unknown        Lung nodule ICD-10-CM: R91.1  ICD-9-CM: 793.11  5/21/2021 Unknown        Dyspnea ICD-10-CM: R06.00  ICD-9-CM: 786.09  5/21/2021 Unknown        Cellulitis of left foot ICD-10-CM: L03.116  ICD-9-CM: 682.7  5/21/2021 Unknown        Morbid obesity ([address] ICD-10-CM: E66.01  ICD-9-CM: 278.01  6/10/2013 Yes        * (Principal) Chest pain ICD-10-CM: R07.9  ICD-9-CM: 786.50  6/9/2013 Unknown        HTN (hypertension) ICD-10-CM: I10  ICD-9-CM: 401.9  6/9/2013 Yes               Chest pain- no chest pain and ce flat  Cardiac monitor, ce trend need to r/o acs   CTA chest negative for PE  Cardiologist planning stress test        Dyspnea  CTA chest no infection  COVID-19 negative      Lung nodule   pulm f/u -planning biopsy   Needed further evaluation     Bradycardia  Resolved   Check TSH     Cellulitis of left foot  Repeat x-ray no fracture , will have mri , add cholchine for possible gout   Antibiotics     Morbid obesity lifestyle modification     htn continue home medication   gerd ppi       Subjective: pain in foot, no chest pain, no sob   Disposition :tbd,   Review of systems:    General: No fevers or chills. Cardiovascular: No chest pain or pressure. No palpitations. Pulmonary: No shortness of breath. Gastrointestinal: No nausea, vomiting.    Msk: pain in left foot     Vital signs/Intake and Output:  Visit Vitals  BP (!) 150/53   Pulse 63   Temp 98 °F (36.7 °C)   Resp 18   Ht 5' 3\" (1.6 m)   Wt 108 kg (238 lb)   SpO2 98%   BMI 42.16 kg/m² Current Shift:  No intake/output data recorded. Last three shifts:  05/20 1901 - 05/22 0700  In: -   Out: 200 [Urine:200]    Physical Exam:  General: WD, WN. Alert, cooperative, no acute distress    HEENT: NC, Atraumatic. PERRLA, anicteric sclerae. Lungs: CTA Bilaterally. No Wheezing/Rhonchi/Rales. Heart:  Regular  rhythm,  No murmur, No Rubs, No Gallops  Abdomen: Soft, Non distended, Non tender. +Bowel sounds,   Extremities: No c/c, left foot swelling and tenderness, warm    Psych:   Not anxious or agitated. Neurologic:  No acute neurological deficit. Labs: Results:       Chemistry Recent Labs     05/22/21 0110 05/21/21  0605 05/19/21 1900   GLU 85 97 84    140 141   K 3.9 4.2 4.3    107 110   CO2 24 26 26   BUN 15 17 19*   CREA 0.91 1.07 1.05   CA 7.9* 8.5 8.9   AGAP 7 7 5   BUCR 16 16 18   AP  --  74 70   TP  --  7.0 7.9   ALB  --  3.2* 3.5   GLOB  --  3.8 4.4*   AGRAT  --  0.8 0.8      CBC w/Diff Recent Labs     05/22/21 0110 05/21/21  0605 05/19/21 1900   WBC 10.4 11.9 12.5   RBC 3.30* 3.62* 3.71*   HGB 10.0* 11.0* 11.5*   HCT 30.6* 34.9* 36.0    270 275   GRANS 66 71 75*   LYMPH 22 17* 14*   EOS 2 1 2      Cardiac Enzymes Recent Labs     05/22/21  0110 05/21/21  1831    137   CKND1 CALCULATION NOT PERFORMED WHEN RESULT IS BELOW LINEAR LIMIT CALCULATION NOT PERFORMED WHEN RESULT IS BELOW LINEAR LIMIT      Coagulation Recent Labs     05/19/21 1900   PTP 13.6   INR 1.1   APTT 31.0       Lipid Panel Lab Results   Component Value Date/Time    Cholesterol, total 122 05/22/2021 01:10 AM    HDL Cholesterol 46 05/22/2021 01:10 AM    LDL, calculated 58.6 05/22/2021 01:10 AM    VLDL, calculated 17.4 05/22/2021 01:10 AM    Triglyceride 87 05/22/2021 01:10 AM    CHOL/HDL Ratio 2.7 05/22/2021 01:10 AM      BNP No results for input(s): BNPP in the last 72 hours.    Liver Enzymes Recent Labs     05/21/21  0605   TP 7.0   ALB 3.2*   AP 74      Thyroid Studies Lab Results Component Value Date/Time    TSH 1.61 05/21/2021 06:05 AM    TSH 1.63 05/21/2021 06:05 AM        Procedures/imaging: see electronic medical records for all procedures/Xrays and details which were not copied into this note but were reviewed prior to creation of Plan    XR HIP LT W OR WO PELV 2-3 VWS    Result Date: 5/19/2021  MEDICAL RECORDS NUMBER: [id number] PROCEDURE: 2 views of the pelvis and left hip DATE: 5/19/2021 6:10 PM HISTORY: 80years old Female. left hip pain Comparison: 1/11/2019 FINDINGS: Again demonstrated are postoperative changes related to a left hip prosthesis. There is relatively good anatomic alignment of the hardware without significant interval change in alignment. There is no osseous abnormality of the pelvis or bilateral proximal femurs. 1. No obvious complicating features of the left hip prosthesis. No acute osseous abnormality. This report has been generated using voice recognition software. XR FOOT LT MIN 3 V    Result Date: 5/21/2021  EXAM: LEFT FOOT RADIOGRAPHS CLINICAL INDICATION/HISTORY: foot pain and swelling -Additional: None COMPARISON: 5/19/2021 TECHNIQUE: 3 views of the left foot _______________ FINDINGS: BONES: No evidence of acute fracture or dislocation. Joint spaces and alignment are maintained. No aggressive appearing osseous lytic or blastic lesion. SOFT TISSUES: Soft tissue fullness about the forefoot. _______________     No evidence of acute fracture or dislocation. XR FOOT LT MIN 3 V    Result Date: 5/19/2021  MEDICAL RECORDS NUMBER: [id number] PROCEDURE: 3 views of the left foot. DATE: 5/19/2021 6:14 PM HISTORY: 80years old Female. pain and swelling Comparison: 7/6/2020 FINDINGS: There is no acute osseous abnormality involving the bones of the hindfoot midfoot or forefoot. There is degenerative osteophytosis noted of the calcaneus. The joint spaces are relatively preserved. Soft tissues are grossly unremarkable.      1. Degenerative calcaneal spurring without evidence of a significant acute osseous abnormality of the left foot. This report has been generated using voice recognition software. CTA CHEST W OR W WO CONT    Result Date: 5/21/2021  CT PA CHEST, PULMONARY ARTERIES History: chest pain and shortness of breath, suspected pulmonary embolism. CT PA technique: Serial axial helical thin section high bolus rate contrast enhanced CT performed from above the diaphragm to just above the aortic arch with nonionic iodine containing IV contrast.  Coronal and sagittal reformations were also performed from axial data to optimize longitudinal assessment for embolus burden and confirm equivocal findings on the axial images. Additional 3-D slab MIP reconstructions were performed. Dose reduction technique: Automated exposure control, adjustment of the mAs and/or kVp according to the patient 's size, and iterative reconstruction techniques were used. Specifics for this study were placed by technologist staff into the patient's electronic medical record. Approximate dose, if determined, reference air kerma: Digital imaging and Communication in Medicine [DICOM] format image data are available to nonaffiliated external healthcare facilities or entities on a secure media free reciprocally searchable basis, with patient authorization, for at least a 12 month period after this study. If not available when requested, the health care system, which is responsible for storage archival and delivery, should be contacted directly. Macro Dose Quality very good No prior CT exams available. Comparison portable chest same date CT findings: Pulmonary Vasc. Ethelyn Raring: There is no evident hypodense intraluminal thrombus, abrupt vascular cutoff, definite eccentric wall thickening down to the level to suggest acute pulmonary thromboembolism. Pulmonary trunk is normal caliber. Thoracic aorta is normal caliber. No evident mediastinal hematoma. Lungs: Lungs are mildly hypoinflated. . There is a spiculated bordered 1 cm soft tissue density pulmonary nodule in the junction of apical and anterior segments right upper lobe. There are a 0.4 cm peripheral pulmonary nodule in the upper posterior basal segment left lower lobe and 0.6 cm nodule more caudally in the same subsegment. Tiny 0.3 cm peripheral pulmonary nodule, perifissural lateral basal segment right lower lobe. Other punctate fissural based nodules multifocally trim right lung probably small pleural lymph nodes. Minimal curvilinear partial atelectasis or scarring, superior lingula. Minimal dependent reticular partial atelectasis. No discrete consolidation, or nodular interstitial thickening. Central airways are patent. Pleura: No definite mass  or pleural effusion. Mild prominence of subpleural fat and paracardiac fat. Mediastinum: No pericardial effusion. Mild cardiomegaly, with multichamber including left heart enlargement. Few scattered normal sized mediastinal lymph nodes. No evident mediastinal or hilar lymphadenopathy. Misc: Large habitus. Multilevel spondylosis, confluent anterior longitudinal ligament ossification. No aggressive/destructive bony lesions are evident acute fracture Upper abdomen: Cholecystectomy clips, interlobar fissure. Small hiatal hernia. Prominent perihilar fat     1. No CTPA evidence of acute pulmonary thromboembolism. Mild hypoinflation and dependent partial atelectasis. 2.  No prior chest CT. Spiculated 1 cm pulmonary nodule, junction of apical and anterior segment right upper lobe, most likely small lung cancer. Other small and indeterminate pulmonary nodules and probable fissural-based pleural lymph nodes. 3. Cardiomegaly. Large habitus. Spondylosis. Cholecystectomy    XR CHEST PORT    Result Date: 5/21/2021  EXAM: XR CHEST PORT CLINICAL INDICATION/HISTORY: SOB -Additional: None COMPARISON: 7/6/2020 TECHNIQUE: Portable frontal view of the chest _______________ FINDINGS: SUPPORT DEVICES: None.  HEART AND MEDIASTINUM: Cardiomediastinal silhouette within normal limits. LUNGS AND PLEURAL SPACES: No dense consolidation, large effusion or pneumothorax. _______________     No acute cardiopulmonary abnormality. DUPLEX LOWER EXT VENOUS LEFT    Result Date: 5/20/2021  · No evidence of deep vein thrombosis in the left lower extremity.         Jane Paul MD

## 2021-05-22 NOTE — PROGRESS NOTES
Cardiology Progress Note        Patient: Ric Grant        Sex: female          DOA: 5/21/2021  YOB: 1935      Age:  80 y.o.        LOS:  LOS: 1 day   Assessment/Plan     Principal Problem:    Chest pain (6/9/2013)    Active Problems:    HTN (hypertension) (6/9/2013)      Morbid obesity ([address] (6/10/2013)      Bradycardia (5/21/2021)      Lung nodule (5/21/2021)      Dyspnea (5/21/2021)      Cellulitis of left foot (5/21/2021)        Plan:  Shortness of breath  Chest pain      Now feeling better  Echocardiogram tomorrow  Stress test on Monday  Discussed with patient. Subjective:    cc:  Shortness of breath  Chest pain        REVIEW OF SYSTEMS:     General: No fevers or chills. Cardiovascular: No chest pain or pressure. No palpitations. No ankle swelling  Pulmonary: No SOB, orthopnea, PND  Gastrointestinal: No nausea, vomiting or diarrhea      Objective:      Visit Vitals  BP (!) 150/53   Pulse 63   Temp 98 °F (36.7 °C)   Resp 18   Ht 5' 3\" (1.6 m)   Wt 108 kg (238 lb)   SpO2 98%   BMI 42.16 kg/m²     Body mass index is 42.16 kg/m². Physical Exam:  General Appearance: Comfortable, not using accessory muscles of respiration. NECK: No JVD, no thyroidomeglay. LUNGS: Clear bilaterally. HEART: S1+S2 audible,    ABD: Non-tender, BS Audible    EXT: No edema, and no cysnosis. VASCULAR EXAM: Pulses are intact. PSYCHIATRIC EXAM: Mood is appropriate.     Medication:  Current Facility-Administered Medications   Medication Dose Route Frequency    colchicine tablet 0.6 mg  0.6 mg Oral DAILY    aspirin chewable tablet 81 mg  81 mg Oral DAILY    nitroglycerin (NITROSTAT) tablet 0.4 mg  0.4 mg SubLINGual Q5MIN PRN    acetaminophen (TYLENOL) tablet 650 mg  650 mg Oral Q4H PRN    morphine injection 1 mg  1 mg IntraVENous Q4H PRN    heparin (porcine) injection 5,000 Units  5,000 Units SubCUTAneous Q8H    oxyCODONE-acetaminophen (PERCOCET) 5-325 mg per tablet 1 Tablet  1 Tablet Oral Q4H PRN    pantoprazole (PROTONIX) tablet 40 mg  40 mg Oral ACB    cefTRIAXone (ROCEPHIN) 1 g in sterile water (preservative free) 10 mL IV syringe  1 g IntraVENous Q24H    Saccharomyces boulardii (FLORASTOR) capsule 500 mg  500 mg Oral BID    olmesartan (BENICAR) tablet 20 mg  20 mg Oral DAILY    And    hydroCHLOROthiazide (MICROZIDE) capsule 12.5 mg  12.5 mg Oral DAILY    Vancomycin-Pharmacy to Dose  1 Each Other Rx Dosing/Monitoring    vancomycin (VANCOCIN) 1,000 mg in 0.9% sodium chloride 250 mL (VIAL-MATE)  1,000 mg IntraVENous Q24H    albuterol-ipratropium (DUO-NEB) 2.5 MG-0.5 MG/3 ML  3 mL Nebulization QID RT               Lab/Data Reviewed:  Procedures/imaging: see electronic medical records for all procedures/Xrays   and details which were not copied into this note but were reviewed prior to creation of Plan       All lab results for the last 24 hours reviewed. Recent Labs     05/22/21  0110 05/21/21  0605 05/19/21  1900   WBC 10.4 11.9 12.5   HGB 10.0* 11.0* 11.5*   HCT 30.6* 34.9* 36.0    270 275     Recent Labs     05/22/21  0110 05/21/21  0605 05/19/21  1900    140 141   K 3.9 4.2 4.3    107 110   CO2 24 26 26   GLU 85 97 84   BUN 15 17 19*   CREA 0.91 1.07 1.05   CA 7.9* 8.5 8.9       RADIOLOGY:  CT Results  (Last 48 hours)               05/21/21 0850  CTA CHEST W OR W WO CONT Final result    Impression:      1. No CTPA evidence of acute pulmonary thromboembolism. Mild hypoinflation and   dependent partial atelectasis. 2.  No prior chest CT. Spiculated 1 cm pulmonary nodule, junction of apical and   anterior segment right upper lobe, most likely small lung cancer. Other small   and indeterminate pulmonary nodules and probable fissural-based pleural lymph   nodes. 3. Cardiomegaly. Large habitus. Spondylosis.  Cholecystectomy       Narrative:  CT PA CHEST, PULMONARY ARTERIES    History: chest pain and shortness of breath, suspected pulmonary embolism. CT PA technique:        Serial axial helical thin section high bolus rate contrast enhanced CT performed   from above the diaphragm to just above the aortic arch with nonionic iodine   containing IV contrast.         Coronal and sagittal reformations were also performed from axial data to   optimize longitudinal assessment for embolus burden and confirm equivocal   findings on the axial images. Additional 3-D slab MIP reconstructions were performed. Dose reduction technique:    Automated exposure control, adjustment of the mAs and/or kVp according to the   patient 's size, and iterative reconstruction techniques were used. Specifics   for this study were placed by technologist staff into the patient's electronic   medical record. Approximate dose, if determined, reference air kerma:   Digital imaging and Communication in Medicine [DICOM] format image data are   available to nonaffiliated external healthcare facilities or entities on a   secure media free reciprocally searchable basis, with patient authorization, for   at least a 12 month period after this study. If not available when requested,   the health care system, which is responsible for storage archival and delivery,   should be contacted directly. Macro Dose       Quality very good       No prior CT exams available. Comparison portable chest same date       CT findings:       Pulmonary Vasc. Marilyn Kansky: There is no evident hypodense intraluminal thrombus, abrupt vascular cutoff,   definite eccentric wall thickening down to the level to suggest acute pulmonary   thromboembolism. Pulmonary trunk is normal caliber. Thoracic aorta is normal caliber. No evident mediastinal hematoma. Lungs:   Lungs are mildly hypoinflated. .        There is a spiculated bordered 1 cm soft tissue density pulmonary nodule in the   junction of apical and anterior segments right upper lobe.  There are a 0.4 cm   peripheral pulmonary nodule in the upper posterior basal segment left lower lobe   and 0.6 cm nodule more caudally in the same subsegment. Tiny 0.3 cm peripheral   pulmonary nodule, perifissural lateral basal segment right lower lobe. Other   punctate fissural based nodules multifocally trim right lung probably small   pleural lymph nodes. Minimal curvilinear partial atelectasis or scarring,   superior lingula. Minimal dependent reticular partial atelectasis. No discrete consolidation, or nodular interstitial thickening. Central airways are patent. Pleura:   No definite mass  or pleural effusion. Mild prominence of subpleural fat and   paracardiac fat. Mediastinum:   No pericardial effusion. Mild cardiomegaly, with multichamber including left   heart enlargement. Few scattered normal sized mediastinal lymph nodes. No evident mediastinal or   hilar lymphadenopathy. Misc:   Large habitus. Multilevel spondylosis, confluent anterior longitudinal ligament ossification. No aggressive/destructive bony lesions are evident acute fracture           Upper abdomen:   Cholecystectomy clips, interlobar fissure. Small hiatal hernia. Prominent   perihilar fat                           CXR Results  (Last 48 hours)               05/21/21 0555  XR CHEST PORT Final result    Impression:      No acute cardiopulmonary abnormality. Narrative:  EXAM: XR CHEST PORT       CLINICAL INDICATION/HISTORY: SOB   -Additional: None       COMPARISON: 7/6/2020       TECHNIQUE: Portable frontal view of the chest       _______________       FINDINGS:       SUPPORT DEVICES: None. HEART AND MEDIASTINUM: Cardiomediastinal silhouette within normal limits.        LUNGS AND PLEURAL SPACES: No dense consolidation, large effusion or   pneumothorax.       _______________                   Cardiology Procedures:   Results for orders placed or performed during the hospital encounter of 05/21/21   EKG, 12 LEAD, INITIAL Result Value Ref Range    Ventricular Rate 57 BPM    Atrial Rate 57 BPM    P-R Interval 176 ms    QRS Duration 84 ms    Q-T Interval 428 ms    QTC Calculation (Bezet) 416 ms    Calculated P Axis 40 degrees    Calculated R Axis -23 degrees    Calculated T Axis 43 degrees    Diagnosis       Sinus bradycardia  Voltage criteria for left ventricular hypertrophy  Abnormal ECG  When compared with ECG of 2020 11:23,  No significant change was found  Confirmed by Lucia Rose MD. ([id number]) on 2021 10:48:31 PM     Results for orders placed or performed during the hospital encounter of 04/29/15   ECG HOLTER MONITOR, UP TO 48 HRS    Narrative                               Holter Monitoring Report                           [address] [address]                                         Test Date:    2015  Genora Age Name:     Luciana Delgado           Department:     Patient ID:   [id number]                Room:           Gender:       FEMALE                   Technician:   Eden Sy  :                         Requested By:  Argelia Costa MD  Order Number:                          Reading MD:   Argelia Costa MD                             Interpretive Statements  Farheen Vasquez was in [place] [address]. The average heart rate, excluding ectopy, was 65 BPM with a minimum of 39 BPM  at  08:11D3 and a maximum of 122 BPM at   15:02D1. Heart beats, including ectopy, totaled 649651 beats. VENTRICULAR ECTOPICS totaled 7  averaging  0.2 per hour  with 7 single, 0  paired, 0 trigeminy and 0 R on T.    SUPRAVENTRICULAR ECTOPICS totaled 15  ,with 5 single and 0 paired beats. SUPRAVENTRICULAR TACHYCARDIA occurred 2 times. The fastest run was at 120 BPM and occurred at 10:54D2 with 6 beats. The longest run was 6 beats at 10:54D2 at a rate of 120 BPM.  1. Rhythm is sinus. 2. Change in coduction beats noted. 3. CT normal, variable QRS.   4. (7) ve''s.  5. (5) single sve''s, (2) runs of svt.     Electronically signed on 05-07-15 15:21:33 CDT by Darylene Fleet, MD      Echo Results  (Last 48 hours)    None       Cardiolite (Tc-99m Sestamibi) stress test    Signed By: Baldemar Hanks MD     May 22, 2021

## 2021-05-22 NOTE — PROGRESS NOTES
Problem: Falls - Risk of  Goal: *Absence of Falls  Description: Document Estevan Shove Fall Risk and appropriate interventions in the flowsheet. Outcome: Progressing Towards Goal  Note: Fall Risk Interventions:            Medication Interventions: Evaluate medications/consider consulting pharmacy, Patient to call before getting OOB, Teach patient to arise slowly    Elimination Interventions: Call light in reach, Patient to call for help with toileting needs, Stay With Me (per policy), Toilet paper/wipes in reach, Toileting schedule/hourly rounds              Problem: Pressure Injury - Risk of  Goal: *Prevention of pressure injury  Description: Document Wyatt Scale and appropriate interventions in the flowsheet.   Outcome: Progressing Towards Goal  Note: Pressure Injury Interventions:  Sensory Interventions: Assess changes in LOC, Minimize linen layers, Maintain/enhance activity level, Keep linens dry and wrinkle-free, Monitor skin under medical devices, Pad between skin to skin, Pressure redistribution bed/mattress (bed type)    Moisture Interventions: Absorbent underpads, Apply protective barrier, creams and emollients, Maintain skin hydration (lotion/cream), Minimize layers, Moisture barrier, Offer toileting Q_hr    Activity Interventions: Pressure redistribution bed/mattress(bed type), PT/OT evaluation    Mobility Interventions: HOB 30 degrees or less, Pressure redistribution bed/mattress (bed type), PT/OT evaluation    Nutrition Interventions: Document food/fluid/supplement intake    Friction and Shear Interventions: HOB 30 degrees or less, Lift sheet, Minimize layers                Problem: Pain  Goal: *Control of Pain  Outcome: Progressing Towards Goal  Goal: *PALLIATIVE CARE:  Alleviation of Pain  Outcome: Progressing Towards Goal

## 2021-05-22 NOTE — PROGRESS NOTES
Problem: Falls - Risk of  Goal: *Absence of Falls  Description: Document Feliz Moncada Fall Risk and appropriate interventions in the flowsheet. Outcome: Progressing Towards Goal  Note: Fall Risk Interventions:  Mobility Interventions: Assess mobility with egress test         Medication Interventions: Patient to call before getting OOB    Elimination Interventions: Call light in reach              Problem: Pressure Injury - Risk of  Goal: *Prevention of pressure injury  Description: Document Wyatt Scale and appropriate interventions in the flowsheet.   Outcome: Progressing Towards Goal  Note: Pressure Injury Interventions:  Sensory Interventions: Assess changes in LOC, Minimize linen layers    Moisture Interventions: Absorbent underpads, Limit adult briefs    Activity Interventions: Assess need for specialty bed, PT/OT evaluation    Mobility Interventions: Assess need for specialty bed, PT/OT evaluation    Nutrition Interventions: Document food/fluid/supplement intake    Friction and Shear Interventions: Apply protective barrier, creams and emollients, Minimize layers

## 2021-05-22 NOTE — PROGRESS NOTES
1930 Assumed care from ROMANA Pineda RN. Shift uneventful. 0710 Bedside and Verbal shift change report given to ROMANA Bryant (oncoming nurse) by Florette Lombard, RN   (offgoing nurse). Report included the following information SBAR, Kardex, ED Summary, Procedure Summary, Intake/Output, MAR, Recent Results and Med Rec Status.

## 2021-05-22 NOTE — PROGRESS NOTES
1730 : Bedside shift change report given to Katheryn Kaur RN (oncoming nurse) by Anthony Angel RN (offgoing nurse). Report included the following information SBAR, Kardex, Intake/Output and MAR.   1145 : patient requesting pain medication, see MAR for administration. 1920 : Bedside shift change report given to [facility] (oncoming nurse) by Juani Wood (offgoing nurse). Report included the following information SBAR, Kardex, Intake/Output and MAR.

## 2021-05-23 ENCOUNTER — APPOINTMENT (OUTPATIENT)
Dept: GENERAL RADIOLOGY | Age: 86
End: 2021-05-23
Attending: HOSPITALIST
Payer: MEDICARE

## 2021-05-23 PROBLEM — K59.00 CONSTIPATION: Status: ACTIVE | Noted: 2021-05-23

## 2021-05-23 LAB
ANION GAP SERPL CALC-SCNC: 7 MMOL/L (ref 3–18)
BASOPHILS # BLD: 0.1 K/UL (ref 0–0.1)
BASOPHILS NFR BLD: 1 % (ref 0–2)
BUN SERPL-MCNC: 14 MG/DL (ref 7–18)
BUN/CREAT SERPL: 15 (ref 12–20)
CALCIUM SERPL-MCNC: 8.4 MG/DL (ref 8.5–10.1)
CHLORIDE SERPL-SCNC: 105 MMOL/L (ref 100–111)
CO2 SERPL-SCNC: 26 MMOL/L (ref 21–32)
CREAT SERPL-MCNC: 0.93 MG/DL (ref 0.6–1.3)
DIFFERENTIAL METHOD BLD: ABNORMAL
EOSINOPHIL # BLD: 0.1 K/UL (ref 0–0.4)
EOSINOPHIL NFR BLD: 1 % (ref 0–5)
ERYTHROCYTE [DISTWIDTH] IN BLOOD BY AUTOMATED COUNT: 13.8 % (ref 11.6–14.5)
GLUCOSE BLD STRIP.AUTO-MCNC: 102 MG/DL (ref 70–110)
GLUCOSE SERPL-MCNC: 111 MG/DL (ref 74–99)
HCT VFR BLD AUTO: 31.6 % (ref 35–45)
HGB BLD-MCNC: 10.1 G/DL (ref 12–16)
LYMPHOCYTES # BLD: 1.9 K/UL (ref 0.9–3.6)
LYMPHOCYTES NFR BLD: 19 % (ref 21–52)
MCH RBC QN AUTO: 30.3 PG (ref 24–34)
MCHC RBC AUTO-ENTMCNC: 32 G/DL (ref 31–37)
MCV RBC AUTO: 94.9 FL (ref 74–97)
MONOCYTES # BLD: 1.1 K/UL (ref 0.05–1.2)
MONOCYTES NFR BLD: 11 % (ref 3–10)
NEUTS SEG # BLD: 6.9 K/UL (ref 1.8–8)
NEUTS SEG NFR BLD: 68 % (ref 40–73)
PLATELET # BLD AUTO: 233 K/UL (ref 135–420)
PMV BLD AUTO: 11 FL (ref 9.2–11.8)
POTASSIUM SERPL-SCNC: 3.5 MMOL/L (ref 3.5–5.5)
RBC # BLD AUTO: 3.33 M/UL (ref 4.2–5.3)
SODIUM SERPL-SCNC: 138 MMOL/L (ref 136–145)
WBC # BLD AUTO: 10.1 K/UL (ref 4.6–13.2)

## 2021-05-23 PROCEDURE — 36415 COLL VENOUS BLD VENIPUNCTURE: CPT

## 2021-05-23 PROCEDURE — 65660000000 HC RM CCU STEPDOWN

## 2021-05-23 PROCEDURE — 82962 GLUCOSE BLOOD TEST: CPT

## 2021-05-23 PROCEDURE — 85025 COMPLETE CBC W/AUTO DIFF WBC: CPT

## 2021-05-23 PROCEDURE — 96376 TX/PRO/DX INJ SAME DRUG ADON: CPT

## 2021-05-23 PROCEDURE — 74018 RADEX ABDOMEN 1 VIEW: CPT

## 2021-05-23 PROCEDURE — 74011250636 HC RX REV CODE- 250/636: Performed by: HOSPITALIST

## 2021-05-23 PROCEDURE — 96372 THER/PROPH/DIAG INJ SC/IM: CPT

## 2021-05-23 PROCEDURE — 74011250637 HC RX REV CODE- 250/637: Performed by: HOSPITALIST

## 2021-05-23 PROCEDURE — 65390000012 HC CONDITION CODE 44 OBSERVATION

## 2021-05-23 PROCEDURE — 74011000250 HC RX REV CODE- 250: Performed by: HOSPITALIST

## 2021-05-23 PROCEDURE — 80048 BASIC METABOLIC PNL TOTAL CA: CPT

## 2021-05-23 RX ORDER — POTASSIUM CHLORIDE 20 MEQ/1
40 TABLET, EXTENDED RELEASE ORAL
Status: COMPLETED | OUTPATIENT
Start: 2021-05-23 | End: 2021-05-23

## 2021-05-23 RX ORDER — AMLODIPINE BESYLATE 5 MG/1
5 TABLET ORAL DAILY
Status: DISCONTINUED | OUTPATIENT
Start: 2021-05-23 | End: 2021-05-26 | Stop reason: HOSPADM

## 2021-05-23 RX ORDER — DOCUSATE SODIUM 100 MG/1
100 CAPSULE, LIQUID FILLED ORAL 2 TIMES DAILY
Status: DISCONTINUED | OUTPATIENT
Start: 2021-05-23 | End: 2021-05-23

## 2021-05-23 RX ORDER — ATENOLOL 50 MG/1
100 TABLET ORAL DAILY
Status: DISCONTINUED | OUTPATIENT
Start: 2021-05-23 | End: 2021-05-23

## 2021-05-23 RX ORDER — ATENOLOL 50 MG/1
100 TABLET ORAL DAILY
Status: DISCONTINUED | OUTPATIENT
Start: 2021-05-24 | End: 2021-05-23

## 2021-05-23 RX ORDER — POLYETHYLENE GLYCOL 3350 17 G/17G
17 POWDER, FOR SOLUTION ORAL 2 TIMES DAILY
Status: DISCONTINUED | OUTPATIENT
Start: 2021-05-23 | End: 2021-05-23

## 2021-05-23 RX ADMIN — HEPARIN SODIUM 5000 UNITS: 5000 INJECTION INTRAVENOUS; SUBCUTANEOUS at 12:19

## 2021-05-23 RX ADMIN — OXYCODONE HYDROCHLORIDE AND ACETAMINOPHEN 1 TABLET: 5; 325 TABLET ORAL at 20:21

## 2021-05-23 RX ADMIN — Medication 500 MG: at 20:21

## 2021-05-23 RX ADMIN — HEPARIN SODIUM 5000 UNITS: 5000 INJECTION INTRAVENOUS; SUBCUTANEOUS at 20:21

## 2021-05-23 RX ADMIN — COLCHICINE 0.6 MG: 0.6 TABLET, FILM COATED ORAL at 09:34

## 2021-05-23 RX ADMIN — WATER 1 G: 1 INJECTION INTRAMUSCULAR; INTRAVENOUS; SUBCUTANEOUS at 13:58

## 2021-05-23 RX ADMIN — HEPARIN SODIUM 5000 UNITS: 5000 INJECTION INTRAVENOUS; SUBCUTANEOUS at 06:52

## 2021-05-23 RX ADMIN — PANTOPRAZOLE SODIUM 40 MG: 40 TABLET, DELAYED RELEASE ORAL at 06:52

## 2021-05-23 RX ADMIN — VANCOMYCIN HYDROCHLORIDE 1000 MG: 1 INJECTION, POWDER, LYOPHILIZED, FOR SOLUTION INTRAVENOUS at 15:42

## 2021-05-23 RX ADMIN — ASPIRIN 81 MG: 81 TABLET, CHEWABLE ORAL at 09:34

## 2021-05-23 RX ADMIN — Medication 500 MG: at 09:34

## 2021-05-23 RX ADMIN — ATENOLOL 100 MG: 50 TABLET ORAL at 13:58

## 2021-05-23 RX ADMIN — POTASSIUM CHLORIDE 40 MEQ: 1500 TABLET, EXTENDED RELEASE ORAL at 15:43

## 2021-05-23 RX ADMIN — AMLODIPINE BESYLATE 5 MG: 5 TABLET ORAL at 18:11

## 2021-05-23 NOTE — PROGRESS NOTES
Cardiology Progress Note        Patient: Winnie Levine        Sex: female          DOA: 5/21/2021  YOB: 1935      Age:  80 y.o.        LOS:  LOS: 2 days   Assessment/Plan     Principal Problem:    Chest pain (6/9/2013)    Active Problems:    HTN (hypertension) (6/9/2013)      Morbid obesity ([address] (6/10/2013)      Bradycardia (5/21/2021)      Lung nodule (5/21/2021)      Dyspnea (5/21/2021)      Cellulitis of left foot (5/21/2021)      Constipation (5/23/2021)        Plan:  Cardiac telemetry sinus bradycardia with a heart rate of 39 bpm probably due to underlying sleep apnea, TSH is normal  Echo and stress test tomorrow  Discussed with patient                  Subjective:    cc:  Chest pain  Dyspnea      REVIEW OF SYSTEMS:     General: No fevers or chills. Cardiovascular: No chest pain or pressure. No palpitations. No ankle swelling  Pulmonary: No SOB, orthopnea, PND  Gastrointestinal: No nausea, vomiting or diarrhea      Objective:      Visit Vitals  BP (!) 140/57   Pulse (!) 48   Temp 99.1 °F (37.3 °C)   Resp 16   Ht 5' 3\" (1.6 m)   Wt 108 kg (238 lb)   SpO2 97%   BMI 42.16 kg/m²     Body mass index is 42.16 kg/m². Physical Exam:  General Appearance: Comfortable, not using accessory muscles of respiration. NECK: No JVD, no thyroidomeglay. LUNGS: Clear bilaterally. HEART: S1+S2 audible,    ABD: Non-tender, BS Audible    EXT: No edema, and no cysnosis. VASCULAR EXAM: Pulses are intact. PSYCHIATRIC EXAM: Mood is appropriate.     Medication:  Current Facility-Administered Medications   Medication Dose Route Frequency    amLODIPine (NORVASC) tablet 5 mg  5 mg Oral DAILY    colchicine tablet 0.6 mg  0.6 mg Oral DAILY    aspirin chewable tablet 81 mg  81 mg Oral DAILY    nitroglycerin (NITROSTAT) tablet 0.4 mg  0.4 mg SubLINGual Q5MIN PRN    acetaminophen (TYLENOL) tablet 650 mg  650 mg Oral Q4H PRN    morphine injection 1 mg  1 mg IntraVENous Q4H PRN    heparin (porcine) injection 5,000 Units  5,000 Units SubCUTAneous Q8H    oxyCODONE-acetaminophen (PERCOCET) 5-325 mg per tablet 1 Tablet  1 Tablet Oral Q4H PRN    pantoprazole (PROTONIX) tablet 40 mg  40 mg Oral ACB    cefTRIAXone (ROCEPHIN) 1 g in sterile water (preservative free) 10 mL IV syringe  1 g IntraVENous Q24H    Saccharomyces boulardii (FLORASTOR) capsule 500 mg  500 mg Oral BID    Vancomycin-Pharmacy to Dose  1 Each Other Rx Dosing/Monitoring    vancomycin (VANCOCIN) 1,000 mg in 0.9% sodium chloride 250 mL (VIAL-MATE)  1,000 mg IntraVENous Q24H    albuterol-ipratropium (DUO-NEB) 2.5 MG-0.5 MG/3 ML  3 mL Nebulization QID RT               Lab/Data Reviewed:  Procedures/imaging: see electronic medical records for all procedures/Xrays   and details which were not copied into this note but were reviewed prior to creation of Plan       All lab results for the last 24 hours reviewed.      Recent Labs     05/23/21  0129 05/22/21  0110 05/21/21  0605   WBC 10.1 10.4 11.9   HGB 10.1* 10.0* 11.0*   HCT 31.6* 30.6* 34.9*    207 270     Recent Labs     05/23/21  0129 05/22/21  0110 05/21/21  0605    141 140   K 3.5 3.9 4.2    110 107   CO2 26 24 26   * 85 97   BUN 14 15 17   CREA 0.93 0.91 1.07   CA 8.4* 7.9* 8.5       RADIOLOGY:  CT Results  (Last 48 hours)    None        CXR Results  (Last 48 hours)    None            Cardiology Procedures:   Results for orders placed or performed during the hospital encounter of 05/21/21   EKG, 12 LEAD, INITIAL   Result Value Ref Range    Ventricular Rate 57 BPM    Atrial Rate 57 BPM    P-R Interval 176 ms    QRS Duration 84 ms    Q-T Interval 428 ms    QTC Calculation (Bezet) 416 ms    Calculated P Axis 40 degrees    Calculated R Axis -23 degrees    Calculated T Axis 43 degrees    Diagnosis       Sinus bradycardia  Voltage criteria for left ventricular hypertrophy  Abnormal ECG  When compared with ECG of 06-JUL-2020 11:23,  No significant change was found  Confirmed by Lula Rivera MD. ([id number]) on 2021 10:48:31 PM     Results for orders placed or performed during the hospital encounter of 04/29/15   ECG HOLTER MONITOR, UP TO [id number]                                         Test Date:    2015  Gonsalo Coombs Name:     [place]           Department:     Patient ID:   [id number]                Room:           Gender:       FEMALE                   Technician:   Mckenna Inman  :                         Requested By:  Kimber Drummond MD  Order Number:                          Reading MD:   Kimber Drummond MD                             Interpretive Statements  Rico Richardson was in [place] [address]. The average heart rate, excluding ectopy, was 65 BPM with a minimum of 39 BPM  at  08:11D3 and a maximum of 122 BPM at   15:02D1. Heart beats, including ectopy, totaled [id number] beats. VENTRICULAR ECTOPICS totaled 7  averaging  0.2 per hour  with 7 single, 0  paired, 0 trigeminy and 0 R on T.    SUPRAVENTRICULAR ECTOPICS totaled 15  ,with 5 single and 0 paired beats. SUPRAVENTRICULAR TACHYCARDIA occurred 2 times. The fastest run was at 120 BPM and occurred at 10:54D2 with 6 beats. The longest run was 6 beats at 10:54D2 at a rate of 120 BPM.  1. Rhythm is sinus. 2. Change in coduction beats noted. 3. AK normal, variable QRS. 4. (7) ve''s.  5. (5) single sve''s, (2) runs of svt.     Electronically signed on 05-07-15 15:21:33 CDT by Kimber Drummond MD      Echo Results  (Last 48 hours)    None       Cardiolite (Tc-99m Sestamibi) stress test    Signed By: Sofya Sharif MD     May 23, 2021

## 2021-05-23 NOTE — PROGRESS NOTES
Hospitalist Progress Note-critical care note     Patient: Page Stanley MRN: [id number]  CSN: [id number]    YOB: 1935  Age: 80 y.o. Sex: female    DOA: 5/21/2021 LOS:  LOS: 2 days            Chief complaint: Lung nodule, dyspnea, cellulitis of left foot morbid obesity chest pain hypertension    Assessment/Plan         Hospital Problems  Date Reviewed: 4/29/2015        Codes Class Noted POA    Constipation ICD-10-CM: K59.00  ICD-9-CM: 564.00  5/23/2021 Unknown        Bradycardia ICD-10-CM: R00.1  ICD-9-CM: 427.89  5/21/2021 Unknown        Lung nodule ICD-10-CM: R91.1  ICD-9-CM: 793.11  5/21/2021 Unknown        Dyspnea ICD-10-CM: R06.00  ICD-9-CM: 786.09  5/21/2021 Unknown        Cellulitis of left foot ICD-10-CM: L03.116  ICD-9-CM: 682.7  5/21/2021 Unknown        Morbid obesity ([address] ICD-10-CM: E66.01  ICD-9-CM: 278.01  6/10/2013 Yes        * (Principal) Chest pain ICD-10-CM: R07.9  ICD-9-CM: 786.50  6/9/2013 Unknown        HTN (hypertension) ICD-10-CM: I10  ICD-9-CM: 401.9  6/9/2013 Yes               Chest pain- no chest pain and ce flat  Cardiac monitor, ce trend need to r/o acs   CTA chest negative for PE  Stress test tomorrow        Dyspnea  CTA chest no infection  COVID-19 negative      Lung nodule   pulm f/u -planning biopsy        Bradycardia  tsh wnl      Cellulitis of left foot  Improving      Morbid obesity lifestyle modification     htn continue home medication   gerd ppi     Constipation    kub no acute issue, add colace, mirilax     Subjective: no bm   rn : take atenolol at home, constipation     Disposition :tbd,   Review of systems:    General: No fevers or chills. Cardiovascular: No chest pain or pressure. No palpitations. Pulmonary: No shortness of breath.    Gastrointestinal: No nausea, vomiting. + constipation     Vital signs/Intake and Output:  Visit Vitals  BP (!) 146/62   Pulse (!) 58   Temp 99 °F (37.2 °C)   Resp 18   Ht 5' 3\" (1.6 m)   Wt 108 kg (238 lb)   SpO2 96% BMI 42.16 kg/m²     Current Shift:  No intake/output data recorded. Last three shifts:  05/21 1901 - 05/23 0700  In: 260 [I.V.:260]  Out: 1600 [Urine:1600]    Physical Exam:  General: WD, WN. Alert, cooperative, no acute distress    HEENT: NC, Atraumatic. PERRLA, anicteric sclerae. Lungs: CTA Bilaterally. No Wheezing/Rhonchi/Rales. Heart:  Regular  rhythm,  No murmur, No Rubs, No Gallops  Abdomen: Soft, Non distended, Non tender. +Bowel sounds,   Extremities: No c/c, left foot swelling -improving   Psych:   Not anxious or agitated. Neurologic:  No acute neurological deficit. Labs: Results:       Chemistry Recent Labs     05/23/21 0129 05/22/21 0110 05/21/21  0605   * 85 97    141 140   K 3.5 3.9 4.2    110 107   CO2 26 24 26   BUN 14 15 17   CREA 0.93 0.91 1.07   CA 8.4* 7.9* 8.5   AGAP 7 7 7   BUCR 15 16 16   AP  --   --  74   TP  --   --  7.0   ALB  --   --  3.2*   GLOB  --   --  3.8   AGRAT  --   --  0.8      CBC w/Diff Recent Labs     05/23/21 0129 05/22/21  0110 05/21/21  0605   WBC 10.1 10.4 11.9   RBC 3.33* 3.30* 3.62*   HGB 10.1* 10.0* 11.0*   HCT 31.6* 30.6* 34.9*    207 270   GRANS 68 66 71   LYMPH 19* 22 17*   EOS 1 2 1      Cardiac Enzymes Recent Labs     05/22/21  0110 05/21/21  1831    137   CKND1 CALCULATION NOT PERFORMED WHEN RESULT IS BELOW LINEAR LIMIT CALCULATION NOT PERFORMED WHEN RESULT IS BELOW LINEAR LIMIT      Coagulation No results for input(s): PTP, INR, APTT, INREXT, INREXT in the last 72 hours. Lipid Panel Lab Results   Component Value Date/Time    Cholesterol, total 122 05/22/2021 01:10 AM    HDL Cholesterol 46 05/22/2021 01:10 AM    LDL, calculated 58.6 05/22/2021 01:10 AM    VLDL, calculated 17.4 05/22/2021 01:10 AM    Triglyceride 87 05/22/2021 01:10 AM    CHOL/HDL Ratio 2.7 05/22/2021 01:10 AM      BNP No results for input(s): BNPP in the last 72 hours.    Liver Enzymes Recent Labs     05/21/21  0605   TP 7.0   ALB 3.2*   AP 74      Thyroid Studies Lab Results   Component Value Date/Time    TSH 1.61 05/21/2021 06:05 AM    TSH 1.63 05/21/2021 06:05 AM        Procedures/imaging: see electronic medical records for all procedures/Xrays and details which were not copied into this note but were reviewed prior to creation of Plan    XR HIP LT W OR WO PELV 2-3 VWS    Result Date: 5/19/2021  MEDICAL RECORDS NUMBER: [id number] PROCEDURE: 2 views of the pelvis and left hip DATE: 5/19/2021 6:10 PM HISTORY: 80years old Female. left hip pain Comparison: 1/11/2019 FINDINGS: Again demonstrated are postoperative changes related to a left hip prosthesis. There is relatively good anatomic alignment of the hardware without significant interval change in alignment. There is no osseous abnormality of the pelvis or bilateral proximal femurs. 1. No obvious complicating features of the left hip prosthesis. No acute osseous abnormality. This report has been generated using voice recognition software. XR FOOT LT MIN 3 V    Result Date: 5/21/2021  EXAM: LEFT FOOT RADIOGRAPHS CLINICAL INDICATION/HISTORY: foot pain and swelling -Additional: None COMPARISON: 5/19/2021 TECHNIQUE: 3 views of the left foot _______________ FINDINGS: BONES: No evidence of acute fracture or dislocation. Joint spaces and alignment are maintained. No aggressive appearing osseous lytic or blastic lesion. SOFT TISSUES: Soft tissue fullness about the forefoot. _______________     No evidence of acute fracture or dislocation. XR FOOT LT MIN 3 V    Result Date: 5/19/2021  MEDICAL RECORDS NUMBER: [id number] PROCEDURE: 3 views of the left foot. DATE: 5/19/2021 6:14 PM HISTORY: 80years old Female. pain and swelling Comparison: 7/6/2020 FINDINGS: There is no acute osseous abnormality involving the bones of the hindfoot midfoot or forefoot. There is degenerative osteophytosis noted of the calcaneus. The joint spaces are relatively preserved. Soft tissues are grossly unremarkable. 1. Degenerative calcaneal spurring without evidence of a significant acute osseous abnormality of the left foot. This report has been generated using voice recognition software. CTA CHEST W OR W WO CONT    Result Date: 5/21/2021  CT PA CHEST, PULMONARY ARTERIES History: chest pain and shortness of breath, suspected pulmonary embolism. CT PA technique: Serial axial helical thin section high bolus rate contrast enhanced CT performed from above the diaphragm to just above the aortic arch with nonionic iodine containing IV contrast.  Coronal and sagittal reformations were also performed from axial data to optimize longitudinal assessment for embolus burden and confirm equivocal findings on the axial images. Additional 3-D slab MIP reconstructions were performed. Dose reduction technique: Automated exposure control, adjustment of the mAs and/or kVp according to the patient 's size, and iterative reconstruction techniques were used. Specifics for this study were placed by technologist staff into the patient's electronic medical record. Approximate dose, if determined, reference air kerma: Digital imaging and Communication in Medicine [DICOM] format image data are available to nonaffiliated external healthcare facilities or entities on a secure media free reciprocally searchable basis, with patient authorization, for at least a 12 month period after this study. If not available when requested, the health care system, which is responsible for storage archival and delivery, should be contacted directly. Macro Dose Quality very good No prior CT exams available. Comparison portable chest same date CT findings: Pulmonary Vasc. Ethelyn Raring: There is no evident hypodense intraluminal thrombus, abrupt vascular cutoff, definite eccentric wall thickening down to the level to suggest acute pulmonary thromboembolism. Pulmonary trunk is normal caliber. Thoracic aorta is normal caliber. No evident mediastinal hematoma.  Lungs: Lungs are mildly hypoinflated. . There is a spiculated bordered 1 cm soft tissue density pulmonary nodule in the junction of apical and anterior segments right upper lobe. There are a 0.4 cm peripheral pulmonary nodule in the upper posterior basal segment left lower lobe and 0.6 cm nodule more caudally in the same subsegment. Tiny 0.3 cm peripheral pulmonary nodule, perifissural lateral basal segment right lower lobe. Other punctate fissural based nodules multifocally trim right lung probably small pleural lymph nodes. Minimal curvilinear partial atelectasis or scarring, superior lingula. Minimal dependent reticular partial atelectasis. No discrete consolidation, or nodular interstitial thickening. Central airways are patent. Pleura: No definite mass  or pleural effusion. Mild prominence of subpleural fat and paracardiac fat. Mediastinum: No pericardial effusion. Mild cardiomegaly, with multichamber including left heart enlargement. Few scattered normal sized mediastinal lymph nodes. No evident mediastinal or hilar lymphadenopathy. Misc: Large habitus. Multilevel spondylosis, confluent anterior longitudinal ligament ossification. No aggressive/destructive bony lesions are evident acute fracture Upper abdomen: Cholecystectomy clips, interlobar fissure. Small hiatal hernia. Prominent perihilar fat     1. No CTPA evidence of acute pulmonary thromboembolism. Mild hypoinflation and dependent partial atelectasis. 2.  No prior chest CT. Spiculated 1 cm pulmonary nodule, junction of apical and anterior segment right upper lobe, most likely small lung cancer. Other small and indeterminate pulmonary nodules and probable fissural-based pleural lymph nodes. 3. Cardiomegaly. Large habitus. Spondylosis.  Cholecystectomy    XR CHEST PORT    Result Date: 5/21/2021  EXAM: XR CHEST PORT CLINICAL INDICATION/HISTORY: SOB -Additional: None COMPARISON: 7/6/2020 TECHNIQUE: Portable frontal view of the chest _______________ FINDINGS: SUPPORT DEVICES: None. HEART AND MEDIASTINUM: Cardiomediastinal silhouette within normal limits. LUNGS AND PLEURAL SPACES: No dense consolidation, large effusion or pneumothorax. _______________     No acute cardiopulmonary abnormality. DUPLEX LOWER EXT VENOUS LEFT    Result Date: 5/20/2021  · No evidence of deep vein thrombosis in the left lower extremity.         Yoly Deng MD

## 2021-05-23 NOTE — PROGRESS NOTES
1920 - Bedside and Verbal shift change report given to Gorge Babinski, RN (oncoming nurse) by Lambert Vanegas RN (offgoing nurse). Report included the following information SBAR, Kardex, Intake/Output, MAR and Recent Results. Shift summary -- Reported left foot and leg pain, described as constant throbbing. Medicated with PRN PO Percocet x 1, which provided relief and allowed patient to rest overnight. 0720 - Bedside and Verbal shift change report given to ROMANA Smith (oncoming nurse) by Gorge Babinski, RN (offgoing nurse). Report included the following information SBAR, Kardex, Intake/Output, MAR and Recent Results.

## 2021-05-24 ENCOUNTER — APPOINTMENT (OUTPATIENT)
Dept: MRI IMAGING | Age: 86
End: 2021-05-24
Attending: HOSPITALIST
Payer: MEDICARE

## 2021-05-24 ENCOUNTER — APPOINTMENT (OUTPATIENT)
Dept: NON INVASIVE DIAGNOSTICS | Age: 86
End: 2021-05-24
Attending: INTERNAL MEDICINE
Payer: MEDICARE

## 2021-05-24 ENCOUNTER — APPOINTMENT (OUTPATIENT)
Dept: NUCLEAR MEDICINE | Age: 86
End: 2021-05-24
Attending: INTERNAL MEDICINE
Payer: MEDICARE

## 2021-05-24 LAB
ANION GAP SERPL CALC-SCNC: 9 MMOL/L (ref 3–18)
BASOPHILS # BLD: 0 K/UL (ref 0–0.1)
BASOPHILS NFR BLD: 1 % (ref 0–2)
BUN SERPL-MCNC: 16 MG/DL (ref 7–18)
BUN/CREAT SERPL: 18 (ref 12–20)
CALCIUM SERPL-MCNC: 8.3 MG/DL (ref 8.5–10.1)
CHLORIDE SERPL-SCNC: 110 MMOL/L (ref 100–111)
CO2 SERPL-SCNC: 24 MMOL/L (ref 21–32)
CREAT SERPL-MCNC: 0.9 MG/DL (ref 0.6–1.3)
DATE LAST DOSE: ABNORMAL
DIFFERENTIAL METHOD BLD: ABNORMAL
ECHO AV ANNULUS DIAM: 2.83 CM
ECHO AV AREA PEAK VELOCITY: 2.08 CM2
ECHO AV AREA VTI: 2.4 CM2
ECHO AV AREA/BSA PEAK VELOCITY: 1 CM2/M2
ECHO AV AREA/BSA VTI: 1.2 CM2/M2
ECHO AV MEAN GRADIENT: 3.35 MMHG
ECHO AV PEAK GRADIENT: 6.43 MMHG
ECHO AV PEAK VELOCITY: 127 CM/S
ECHO AV VTI: 33.78 CM
ECHO IVC PROX: 1.59 CM
ECHO LA VOL 2C: 67.48 ML (ref 22–52)
ECHO LA VOL 4C: 61.54 ML (ref 22–52)
ECHO LA VOL BP: 75.87 ML (ref 22–52)
ECHO LV E' LATERAL VELOCITY: 9 CM/S
ECHO LV E' SEPTAL VELOCITY: 8 CM/S
ECHO LV EDV A2C: 118.87 ML
ECHO LV EDV A4C: 100.61 ML
ECHO LV EDV BP: 110.17 ML (ref 56–104)
ECHO LV EJECTION FRACTION A2C: 70 %
ECHO LV EJECTION FRACTION A4C: 59 %
ECHO LV EJECTION FRACTION BIPLANE: 64.8 % (ref 55–100)
ECHO LV ESV A2C: 35.44 ML
ECHO LV ESV A4C: 41.16 ML
ECHO LV ESV BP: 38.8 ML (ref 19–49)
ECHO LV INTERNAL DIMENSION DIASTOLIC: 4.48 CM (ref 3.9–5.3)
ECHO LV INTERNAL DIMENSION SYSTOLIC: 2.85 CM
ECHO LV IVSD: 1.22 CM (ref 0.6–0.9)
ECHO LV MASS 2D: 181.7 G (ref 67–162)
ECHO LV MASS INDEX 2D: 87.3 G/M2 (ref 43–95)
ECHO LV POSTERIOR WALL DIASTOLIC: 1.05 CM (ref 0.6–0.9)
ECHO LVOT CARDIAC OUTPUT: 3.71 L/MIN
ECHO LVOT DIAM: 1.97 CM
ECHO LVOT PEAK GRADIENT: 3.01 MMHG
ECHO LVOT PEAK VELOCITY: 87 CM/S
ECHO LVOT SV: 81.2 ML
ECHO LVOT SV: 83.4 ML
ECHO LVOT VTI: 26.62 CM
ECHO MV A VELOCITY: 90 CM/S
ECHO MV AREA PHT: 4.22 CM2
ECHO MV E DECELERATION TIME (DT): 179.86 MS
ECHO MV E VELOCITY: 114 CM/S
ECHO MV E/A RATIO: 1.27
ECHO MV E/E' LATERAL: 12.67
ECHO MV E/E' RATIO (AVERAGED): 13.46
ECHO MV E/E' SEPTAL: 14.25
ECHO MV PRESSURE HALF TIME (PHT): 52.16 MS
ECHO RA AREA 4C: 19.59 CM2
ECHO RV INTERNAL DIMENSION: 4.26 CM
ECHO TV REGURGITANT MAX VELOCITY: 274 CM/S
ECHO TV REGURGITANT PEAK GRADIENT: 30.12 MMHG
EOSINOPHIL # BLD: 0.3 K/UL (ref 0–0.4)
EOSINOPHIL NFR BLD: 4 % (ref 0–5)
ERYTHROCYTE [DISTWIDTH] IN BLOOD BY AUTOMATED COUNT: 13.6 % (ref 11.6–14.5)
GLUCOSE BLD STRIP.AUTO-MCNC: 123 MG/DL (ref 70–110)
GLUCOSE SERPL-MCNC: 99 MG/DL (ref 74–99)
HCT VFR BLD AUTO: 31.2 % (ref 35–45)
HGB BLD-MCNC: 10.2 G/DL (ref 12–16)
LVOT MG: 1.73 MMHG
LYMPHOCYTES # BLD: 1.9 K/UL (ref 0.9–3.6)
LYMPHOCYTES NFR BLD: 22 % (ref 21–52)
MCH RBC QN AUTO: 30.5 PG (ref 24–34)
MCHC RBC AUTO-ENTMCNC: 32.7 G/DL (ref 31–37)
MCV RBC AUTO: 93.4 FL (ref 74–97)
MONOCYTES # BLD: 1.1 K/UL (ref 0.05–1.2)
MONOCYTES NFR BLD: 13 % (ref 3–10)
NEUTS SEG # BLD: 5.1 K/UL (ref 1.8–8)
NEUTS SEG NFR BLD: 60 % (ref 40–73)
PLATELET # BLD AUTO: 293 K/UL (ref 135–420)
PMV BLD AUTO: 10.2 FL (ref 9.2–11.8)
POTASSIUM SERPL-SCNC: 4.1 MMOL/L (ref 3.5–5.5)
RBC # BLD AUTO: 3.34 M/UL (ref 4.2–5.3)
REPORTED DOSE,DOSE: ABNORMAL UNITS
REPORTED DOSE/TIME,TMG: 1600
SODIUM SERPL-SCNC: 143 MMOL/L (ref 136–145)
STRESS BASELINE HR: 46 BPM
STRESS ESTIMATED WORKLOAD: 1 METS
STRESS EXERCISE DUR MIN: NORMAL
STRESS PEAK DIAS BP: 65 MMHG
STRESS PEAK SYS BP: 196 MMHG
STRESS PERCENT HR ACHIEVED: 58 %
STRESS POST PEAK HR: 78 BPM
STRESS RATE PRESSURE PRODUCT: NORMAL BPM*MMHG
STRESS ST DEPRESSION: 0 MM
STRESS ST ELEVATION: 0 MM
STRESS TARGET HR: 134 BPM
VANCOMYCIN TROUGH SERPL-MCNC: 7.5 UG/ML (ref 10–20)
WBC # BLD AUTO: 8.5 K/UL (ref 4.6–13.2)

## 2021-05-24 PROCEDURE — 36415 COLL VENOUS BLD VENIPUNCTURE: CPT

## 2021-05-24 PROCEDURE — 93306 TTE W/DOPPLER COMPLETE: CPT

## 2021-05-24 PROCEDURE — 99218 HC RM OBSERVATION: CPT

## 2021-05-24 PROCEDURE — A9500 TC99M SESTAMIBI: HCPCS

## 2021-05-24 PROCEDURE — 93017 CV STRESS TEST TRACING ONLY: CPT

## 2021-05-24 PROCEDURE — 74011250637 HC RX REV CODE- 250/637: Performed by: HOSPITALIST

## 2021-05-24 PROCEDURE — 73718 MRI LOWER EXTREMITY W/O DYE: CPT

## 2021-05-24 PROCEDURE — 96372 THER/PROPH/DIAG INJ SC/IM: CPT

## 2021-05-24 PROCEDURE — 80048 BASIC METABOLIC PNL TOTAL CA: CPT

## 2021-05-24 PROCEDURE — 96376 TX/PRO/DX INJ SAME DRUG ADON: CPT

## 2021-05-24 PROCEDURE — 85025 COMPLETE CBC W/AUTO DIFF WBC: CPT

## 2021-05-24 PROCEDURE — 74011000250 HC RX REV CODE- 250: Performed by: INTERNAL MEDICINE

## 2021-05-24 PROCEDURE — 74011250636 HC RX REV CODE- 250/636: Performed by: HOSPITALIST

## 2021-05-24 PROCEDURE — 65390000012 HC CONDITION CODE 44 OBSERVATION

## 2021-05-24 PROCEDURE — 74011000250 HC RX REV CODE- 250: Performed by: HOSPITALIST

## 2021-05-24 PROCEDURE — 80202 ASSAY OF VANCOMYCIN: CPT

## 2021-05-24 PROCEDURE — 82962 GLUCOSE BLOOD TEST: CPT

## 2021-05-24 RX ORDER — IPRATROPIUM BROMIDE AND ALBUTEROL SULFATE 2.5; .5 MG/3ML; MG/3ML
3 SOLUTION RESPIRATORY (INHALATION)
Status: DISCONTINUED | OUTPATIENT
Start: 2021-05-24 | End: 2021-05-26 | Stop reason: HOSPADM

## 2021-05-24 RX ORDER — VANCOMYCIN/0.9 % SOD CHLORIDE 1.5G/250ML
1500 PLASTIC BAG, INJECTION (ML) INTRAVENOUS EVERY 24 HOURS
Status: DISCONTINUED | OUTPATIENT
Start: 2021-05-24 | End: 2021-05-26 | Stop reason: HOSPADM

## 2021-05-24 RX ORDER — ONDANSETRON 2 MG/ML
4 INJECTION INTRAMUSCULAR; INTRAVENOUS
Status: DISCONTINUED | OUTPATIENT
Start: 2021-05-24 | End: 2021-05-26 | Stop reason: HOSPADM

## 2021-05-24 RX ORDER — NALOXONE HYDROCHLORIDE 0.4 MG/ML
0.4 INJECTION, SOLUTION INTRAMUSCULAR; INTRAVENOUS; SUBCUTANEOUS AS NEEDED
Status: DISCONTINUED | OUTPATIENT
Start: 2021-05-24 | End: 2021-05-26 | Stop reason: HOSPADM

## 2021-05-24 RX ADMIN — ASPIRIN 81 MG: 81 TABLET, CHEWABLE ORAL at 08:13

## 2021-05-24 RX ADMIN — WATER 1 G: 1 INJECTION INTRAMUSCULAR; INTRAVENOUS; SUBCUTANEOUS at 14:13

## 2021-05-24 RX ADMIN — REGADENOSON 0.4 MG: 0.08 INJECTION, SOLUTION INTRAVENOUS at 11:51

## 2021-05-24 RX ADMIN — Medication 500 MG: at 21:19

## 2021-05-24 RX ADMIN — VANCOMYCIN HYDROCHLORIDE 1500 MG: 10 INJECTION, POWDER, LYOPHILIZED, FOR SOLUTION INTRAVENOUS at 18:36

## 2021-05-24 RX ADMIN — AMLODIPINE BESYLATE 5 MG: 5 TABLET ORAL at 08:13

## 2021-05-24 RX ADMIN — ACETAMINOPHEN 650 MG: 325 TABLET ORAL at 21:25

## 2021-05-24 RX ADMIN — Medication 500 MG: at 08:13

## 2021-05-24 RX ADMIN — PANTOPRAZOLE SODIUM 40 MG: 40 TABLET, DELAYED RELEASE ORAL at 06:12

## 2021-05-24 RX ADMIN — HEPARIN SODIUM 5000 UNITS: 5000 INJECTION INTRAVENOUS; SUBCUTANEOUS at 13:29

## 2021-05-24 RX ADMIN — HEPARIN SODIUM 5000 UNITS: 5000 INJECTION INTRAVENOUS; SUBCUTANEOUS at 21:19

## 2021-05-24 RX ADMIN — HEPARIN SODIUM 5000 UNITS: 5000 INJECTION INTRAVENOUS; SUBCUTANEOUS at 06:11

## 2021-05-24 RX ADMIN — COLCHICINE 0.6 MG: 0.6 TABLET, FILM COATED ORAL at 08:13

## 2021-05-24 NOTE — PROGRESS NOTES
Discharge Planning: Home with family assist vs home with home health    Chart reviewed. CM spoke with the patient at the bedside and informed her of the CM's role. The patient confirmed demographics and states that Marie Shabazz is no longer her PCP. CM informed the patient that care management would be happy to assist her in finding a new PCP. The patient is unsure if she would like assistance with this currently and states that she will get back to this CM in regards to assistance with finding a new PCP. CM and the patient discussed discharge plans to include DME, family support, and transportation to and from appointments. The patient states that she lives alone and uses a rollator for ambulation. The patient denies any questions or concerns at this time. CM to follow the patient's progress and be available to assist with discharge planning as needed. CMS referral placed. 1630:   Virtual reviewer communicated change to CM, which reflect outpatient observation order written prior to Written discharge order. Code 44 delivered and given to patient. CM met with the patient and provided to the patient the printed information about her outpatient observation status. The patient was given the flyer entitled, \"Medicare Outpatient Observation: Information & notification. \" All questions were answered, no additional discharge needs identified at this time and patient expects to return to their (home, assisted living facility, relatives home, etc.) after discharge today. Copy provided to patient or sent secure email, secure fax , or certified mail to patient's loved one per their request.    Care Management Interventions  PCP Verified by CM:  Yes  Transition of Care Consult (CM Consult): Discharge Planning  Current Support Network: Lives Alone  Confirm Follow Up Transport: Friends  Discharge Location  Discharge Placement: Home with family assistance

## 2021-05-24 NOTE — PROGRESS NOTES
0710 : Bedside shift change report given to Tequila Calderon RN (oncoming nurse) by Beto Mensah RN (offgoing nurse). Report included the following information SBAR, Kardex, Intake/Output and MAR.   0850 : pt transferred off unit for stress test.   0905 : spoke with Dr. Du Marie regarding pt nausea, ordered zofran. Made DR Du Marie aware of pt hayley down to 38 in [employer], md stated within pt normal.  1330 : pt back on floor from procedures  1920 : Bedside shift change report given to [address] (oncoming nurse) by Tequila Calderon RN (offgoing nurse). Report included the following information SBAR, Kardex, Intake/Output and MAR.

## 2021-05-24 NOTE — PROGRESS NOTES
0730 Bedside and Verbal shift change report given to ROMANA Macias RN (oncoming nurse) by ROSAS Oh RN (offgoing nurse). Report included the following information SBAR, Kardex, Intake/Output, and MAR. Pt in bed, call light in reach. 0931 Pt assessed. No signs of acute distress. Pt in bed. Pt refused olmesartan stating it is an old prescription, she only takes atenolol now. Will notify MD Du Marie, PTA med list updated. Pt having abd pain. 1217 Pt assessed. No signs of acute distress. Pt in bed. Pt had large BM, refused stool softeners as pt had gone. Abd pain improving     1554 Pt assessed. No signs of acute distress. Pt in bed.    1636 Pt HR in 40s-50s, pt asymptomatic. Notified MD Du Marie, atenolol d/c.     1930 Bedside and Verbal shift change report given to Jessenia Solis RN (oncoming nurse) by Penelope Macias RN (offgoing nurse). Report included the following information SBAR, Kardex, Intake/Output and MAR. Pt in bed, call light in reach.

## 2021-05-24 NOTE — PROGRESS NOTES
Problem: Falls - Risk of  Goal: *Absence of Falls  Description: Document Avilacalvin Tapia Fall Risk and appropriate interventions in the flowsheet. Outcome: Progressing Towards Goal  Note: Fall Risk Interventions:  Mobility Interventions: Communicate number of staff needed for ambulation/transfer, Patient to call before getting OOB, PT Consult for mobility concerns, Utilize walker, cane, or other assistive device         Medication Interventions: Evaluate medications/consider consulting pharmacy, Patient to call before getting OOB, Teach patient to arise slowly    Elimination Interventions: Call light in reach, Patient to call for help with toileting needs, Stay With Me (per policy), Toileting schedule/hourly rounds              Problem: Pressure Injury - Risk of  Goal: *Prevention of pressure injury  Description: Document Wyatt Scale and appropriate interventions in the flowsheet.   Outcome: Progressing Towards Goal  Note: Pressure Injury Interventions:  Sensory Interventions: Assess changes in LOC, Keep linens dry and wrinkle-free, Maintain/enhance activity level, Minimize linen layers, Monitor skin under medical devices, Pad between skin to skin, Pressure redistribution bed/mattress (bed type)    Moisture Interventions: Absorbent underpads, Apply protective barrier, creams and emollients, Limit adult briefs, Minimize layers    Activity Interventions: Pressure redistribution bed/mattress(bed type), PT/OT evaluation    Mobility Interventions: HOB 30 degrees or less, Pressure redistribution bed/mattress (bed type), PT/OT evaluation    Nutrition Interventions: Document food/fluid/supplement intake, Discuss nutritional consult with provider, Offer support with meals,snacks and hydration    Friction and Shear Interventions: HOB 30 degrees or less, Lift sheet                Problem: Pain  Goal: *Control of Pain  Outcome: Progressing Towards Goal  Goal: *PALLIATIVE CARE:  Alleviation of Pain  Outcome: Progressing Towards Goal

## 2021-05-24 NOTE — PROGRESS NOTES
Pulmonary and Sleep Medicine     Pulmonary Note    Patient: Angélica Raya               Sex: female          DOA: 5/21/2021       YOB: 1935      Age:  80 y.o.        LOS:  LOS: 3 days        Reason for Consult: lung nodules    IMPRESSION:   · Asthma - J45.909  · Lung Nodule - R91.1  · Cellulitis left foot - L03. 116  · Morbid obesity - E66.01  ·    RECOMMENDATIONS:   -CT chest reviewed with radiologist Dr David Lawson; lung nodule has spiculated appearance suggestive of primary lung cancer  -discussed with patient; RT UL lung nodule; patient mentions small lung nodule in the past being monitored at [facility]; no prior cancer history; non-smoker  -discussed about CT lung biopsy; patient not keen; would need follow up in outpatient for evaluation for PET scan   -continue antibiotic for cellulits  -Prn duoneb   -DVT proph sc heparin    Note - I could not locate prior chest CT or see pulmonary notes on care-everywhere. I would sign off; follow up with Dr Melissa Germain in 4 weeks. High complexity review and management       HPI:   Ms. Angélica Raya is a 80 y.o. female who is seen at the request of Dr. Samanta Arreguin for pulmonary nodules. Pt reports PMHx for childhood asthma that has been in remission, morbid obesity, GERD, arthritis, mood disorders, chronic pain, hypertension, IBS presented to [facility] ER with complaint of dyspnea, foot discomfort without any fever, chills, wheezing, cough, phlegm, orthopnea, PND. She reported some chest discomfort midsternal nonradiating associated with feeling of palpitation and mild left ankle swelling. Non-smoker. Not on any inhalers. Denies occupational exposure or significant family history for chronic lung disease. Reports prior cardiac work-up. In the ER, CTA chest showed multiple pulmonary nodules, one of them in RUL 1 cm concerning for lung malignancy. 5/24/2021  Chart reviewed; discussed during sign out. Patient seen in medical unit.   Breathing good  No chest pains.  LT foot pains. Review of Systems:  Ears, nose, mouth, throat, and face: No epistaxis, no difficulty in swallowing  Respiratory: as above  Cardiovascular: no chest pain or palpitations  Gastrointestinal: no abd pain, vomitting or diarrhea  Neurological: No focal weakness or seizures or headaches  Constitutional: No fever or chills    Past Medical History  Past Medical History:   Diagnosis Date    Arthritis     osteo    Asthma     inhaler as needed - every 2 to 3 years    Chronic pain     knee and hip ,shoulder     GERD (gastroesophageal reflux disease)     Hypertension     5yrs    Irritable bowel     Other ill-defined conditions(799.89)     h/o migraines    Other ill-defined conditions(799.89)     h/o dizzy    Other ill-defined conditions(799.89)     IBS; hiatal hernia    Psychiatric disorder     depression    Stroke ([address] 9 years ago     stroke - no deficits    Stroke ([facility] [address]     In 1997/98       Past Surgical History  Past Surgical History:   Procedure Laterality Date    HX CHOLECYSTECTOMY      HX HERNIA REPAIR      umbilical twice    HX HYSTERECTOMY      HX INTRAOCULAR LENS PROSTHESIS      HX KNEE ARTHROSCOPY      left    HX KNEE REPLACEMENT Left 2013    HX OTHER SURGICAL      herniated disc surgery    HX TUBAL LIGATION      LAP UMBILICAL HERNIA REPAIR      NEUROLOGICAL PROCEDURE UNLISTED      lower back    MD CARDIAC SURG PROCEDURE UNLIST      MD LAP,CHOLECYSTECTOMY         Family History  Family History   Problem Relation Age of Onset    Cancer Father     Malignant Hyperthermia Neg Hx     Pseudocholinesterase Deficiency Neg Hx     Post-op Nausea/Vomiting Neg Hx     Delayed Awakening Neg Hx     Emergence Delirium Neg Hx     Post-op Cognitive Dysfunction Neg Hx     Other Neg Hx        Social History  Social History     Tobacco Use    Smoking status: Never Smoker    Smokeless tobacco: Never Used   Substance Use Topics    Alcohol use: No    Drug use:  No Medications  Current Facility-Administered Medications   Medication Dose Route Frequency    ondansetron (ZOFRAN) injection 4 mg  4 mg IntraVENous Q8H PRN    Vancomycin Trough Level @ 1530 on 2021  1 Each Other ONCE    naloxone (NARCAN) injection 0.4 mg  0.4 mg IntraVENous PRN    amLODIPine (NORVASC) tablet 5 mg  5 mg Oral DAILY    colchicine tablet 0.6 mg  0.6 mg Oral DAILY    aspirin chewable tablet 81 mg  81 mg Oral DAILY    nitroglycerin (NITROSTAT) tablet 0.4 mg  0.4 mg SubLINGual Q5MIN PRN    acetaminophen (TYLENOL) tablet 650 mg  650 mg Oral Q4H PRN    morphine injection 1 mg  1 mg IntraVENous Q4H PRN    heparin (porcine) injection 5,000 Units  5,000 Units SubCUTAneous Q8H    oxyCODONE-acetaminophen (PERCOCET) 5-325 mg per tablet 1 Tablet  1 Tablet Oral Q4H PRN    pantoprazole (PROTONIX) tablet 40 mg  40 mg Oral ACB    cefTRIAXone (ROCEPHIN) 1 g in sterile water (preservative free) 10 mL IV syringe  1 g IntraVENous Q24H    Saccharomyces boulardii (FLORASTOR) capsule 500 mg  500 mg Oral BID    Vancomycin-Pharmacy to Dose  1 Each Other Rx Dosing/Monitoring    vancomycin (VANCOCIN) 1,000 mg in 0.9% sodium chloride 250 mL (VIAL-MATE)  1,000 mg IntraVENous Q24H    albuterol-ipratropium (DUO-NEB) 2.5 MG-0.5 MG/3 ML  3 mL Nebulization QID RT       Allergy  No Known Allergies    Physical Exam:   Vital Signs:    Blood pressure (!) 148/60, pulse (!) 52, temperature 98.3 °F (36.8 °C), resp. rate 18, height 5' 3\" (1.6 m), weight 108 kg (238 lb), SpO2 99 %. Body mass index is 42.16 kg/m². O2 Device: None (Room air)   O2 Flow Rate (L/min): 2 l/min   Temp (24hrs), Av.7 °F (37.1 °C), Min:98.2 °F (36.8 °C), Max:99.1 °F (37.3 °C)       Intake/Output:   Last shift:      No intake/output data recorded.   Last 3 shifts: 1901 - 05/24 0700  In: 480 [P.O.:480]  Out: 2200 [Urine:2200]    Intake/Output Summary (Last 24 hours) at 2021 1427  Last data filed at 2021 0110  Gross per 24 hour Intake 480 ml   Output 500 ml   Net -20 ml        Physical Exam:   Comfortable; obese; on room air; acyanotic  HEENT: pupils not dilated, no scleral jaundice, moist oral mucosa  Neck: No adenopathy or thyroid swelling  CVS: S1S2 no murmurs; JVD not elevated  RS: Good air entry bilaterally, no wheezes or crackles  Abd: soft, non tender, no hepatosplenomegaly, no abd distension  Neuro: non focal, awake, alert  Extrm: no leg edema or clubbing; LT Foot swelling, redness and tenderness  Skin: no rash  Lymphatic: no cervical or supraclavicular adenopathy  Psych: normal mood      Labs Reviewed:  Recent Results (from the past 24 hour(s))   CBC WITH AUTOMATED DIFF    Collection Time: 05/24/21  2:20 AM   Result Value Ref Range    WBC 8.5 4.6 - 13.2 K/uL    RBC 3.34 (L) 4.20 - 5.30 M/uL    HGB 10.2 (L) 12.0 - 16.0 g/dL    HCT 31.2 (L) 35.0 - 45.0 %    MCV 93.4 74.0 - 97.0 FL    MCH 30.5 24.0 - 34.0 PG    MCHC 32.7 31.0 - 37.0 g/dL    RDW 13.6 11.6 - 14.5 %    PLATELET 526 192 - 588 K/uL    MPV 10.2 9.2 - 11.8 FL    NEUTROPHILS 60 40 - 73 %    LYMPHOCYTES 22 21 - 52 %    MONOCYTES 13 (H) 3 - 10 %    EOSINOPHILS 4 0 - 5 %    BASOPHILS 1 0 - 2 %    ABS. NEUTROPHILS 5.1 1.8 - 8.0 K/UL    ABS. LYMPHOCYTES 1.9 0.9 - 3.6 K/UL    ABS. MONOCYTES 1.1 0.05 - 1.2 K/UL    ABS. EOSINOPHILS 0.3 0.0 - 0.4 K/UL    ABS.  BASOPHILS 0.0 0.0 - 0.1 K/UL    DF AUTOMATED     METABOLIC PANEL, BASIC    Collection Time: 05/24/21  2:20 AM   Result Value Ref Range    Sodium 143 136 - 145 mmol/L    Potassium 4.1 3.5 - 5.5 mmol/L    Chloride 110 100 - 111 mmol/L    CO2 24 21 - 32 mmol/L    Anion gap 9 3.0 - 18 mmol/L    Glucose 99 74 - 99 mg/dL    BUN 16 7.0 - 18 MG/DL    Creatinine 0.90 0.6 - 1.3 MG/DL    BUN/Creatinine ratio 18 12 - 20      GFR est AA >60 >60 ml/min/1.73m2    GFR est non-AA 59 (L) >60 ml/min/1.73m2    Calcium 8.3 (L) 8.5 - 10.1 MG/DL   ECHO ADULT COMPLETE    Collection Time: 05/24/21 11:10 AM   Result Value Ref Range    IVSd 1.22 (A) 0.60 - 0.90 cm    LVIDd 4.48 3.90 - 5.30 cm    LVIDs 2.85 cm    LVOT d 1.97 cm    LVPWd 1.05 (A) 0.60 - 0.90 cm    BP EF 64.8 55.0 - 100.0 %    LV Ejection Fraction MOD 2C 70 %    LV Ejection Fraction MOD 4C 59 %    LV ED Vol A2C 118.87 ml    LV ED Vol A4C 100.61 ml    LV ED Vol .17 (A) 56.0 - 104.0 ml    LV ES Vol A2C 35.44 ml    LV ES Vol A4C 41.16 ml    LV ES Vol BP 38.80 19.0 - 49.0 ml    LVOT SV 83.4 ml    LVOT Cardiac Output 3.71 l/min    LVOT Peak Gradient 3.01 mmHg    Left Ventricular Outflow Tract Mean Gradient 1.73 mmHg    LVOT SV 81.2 ml    LVOT Peak Velocity 87.00 cm/s    LVOT VTI 26.62 cm    RVIDd 4.26 cm    LA Volume 75.87 22.0 - 52.0 ml    LA Vol 2C 67.48 (A) 22.00 - 52.00 ml    LA Vol 4C 61.54 (A) 22.00 - 52.00 ml    Right Atrial Area 4C 19.59 cm2    AV Annulus 2.83 cm    Aortic Valve Area by Continuity of Peak Velocity 2.08 cm2    Aortic Valve Area by Continuity of VTI 2.40 cm2    AoV PG 6.43 mmHg    Aortic Valve Systolic Mean Gradient 7.20 mmHg    Aortic Valve Systolic Peak Velocity 161.29 cm/s    AoV VTI 33.78 cm    MV A Marcos 90.00 cm/s    Mitral Valve E Wave Deceleration Time 179.86 ms    MV E Marcos 114.00 cm/s    Mitral Valve Pressure Half-time 52.16 ms    MVA (PHT) 4.22 cm2    Triscuspid Valve Regurgitation Peak Gradient 30.12 mmHg    TR Max Velocity 274.00 cm/s    LV E' Septal Velocity 8.00 cm/s    LV E' Lateral Velocity 9.00 cm/s    MV E/A 1.27     LV Mass .7 67.0 - 162.0 g    LV Mass AL Index 87.3 43.0 - 95.0 g/m2    E/E' lateral 12.67     E/E' septal 14.25     IVC proximal 1.59 cm    E/E' ratio (averaged) 13.46     SADIQ/BSA Pk Marcos 1.0 cm2/m2    SADIQ/BSA VTI 1.2 cm2/m2   NUCLEAR CARDIAC STRESS TEST    Collection Time: 05/24/21 12:49 PM   Result Value Ref Range    Target  bpm    Exercise duration time 00:00:57     Stress Base Systolic  mmHg    Stress Base Diastolic BP 65 mmHg    Post peak HR 78 BPM    Baseline HR 46 BPM    Estimated workload 1.0 METS           No results for input(s): FIO2I, IFO2, HCO3I, IHCO3, HCOPOC, PCO2I, PCOPOC, IPHI, PHI, PHPOC, PO2I, PO2POC in the last 72 hours. No lab exists for component: IPOC2    All Micro Results     Procedure Component Value Units Date/Time    COVID-19 RAPID TEST [[id number]] Collected: 05/21/21 1251    Order Status: Completed Specimen: Nasopharyngeal Updated: 05/21/21 1343     Specimen source Nasopharyngeal        COVID-19 rapid test Not detected        Comment: Rapid Abbott ID Now       Rapid NAAT:  The specimen is NEGATIVE for SARS-CoV-2, the novel coronavirus associated with COVID-19. Negative results should be treated as presumptive and, if inconsistent with clinical signs and symptoms or necessary for patient management, should be tested with an alternative molecular assay. Negative results do not preclude SARS-CoV-2 infection and should not be used as the sole basis for patient management decisions. This test has been authorized by the FDA under an Emergency Use Authorization (EUA) for use by authorized laboratories. Fact sheet for Healthcare Providers: [employer]date.co[employer]nz  Fact sheet for Patients: ConventionUpdate[employer]co.nz       Methodology: Isothermal Nucleic Acid Amplification                 Imaging:  [x]I have personally reviewed the patients chest radiographs images and report with the patient      Results from Hospital Encounter encounter on 05/21/21    CTA CHEST W OR W WO CONT    Narrative  CT PA CHEST, PULMONARY ARTERIES  History: chest pain and shortness of breath, suspected pulmonary embolism.     CT PA technique:    Serial axial helical thin section high bolus rate contrast enhanced CT performed  from above the diaphragm to just above the aortic arch with nonionic iodine  containing IV contrast.    Coronal and sagittal reformations were also performed from axial data to  optimize longitudinal assessment for embolus burden and confirm equivocal  findings on the axial images. Additional 3-D slab MIP reconstructions were performed. Dose reduction technique:  Automated exposure control, adjustment of the mAs and/or kVp according to the  patient 's size, and iterative reconstruction techniques were used. Specifics  for this study were placed by technologist staff into the patient's electronic  medical record. Approximate dose, if determined, reference air kerma:  Digital imaging and Communication in Medicine [DICOM] format image data are  available to nonaffiliated external healthcare facilities or entities on a  secure media free reciprocally searchable basis, with patient authorization, for  at least a 12 month period after this study. If not available when requested,  the health care system, which is responsible for storage archival and delivery,  should be contacted directly. Macro Dose    Quality very good    No prior CT exams available. Comparison portable chest same date    CT findings:    Pulmonary Vasc. Conception Janet: There is no evident hypodense intraluminal thrombus, abrupt vascular cutoff,  definite eccentric wall thickening down to the level to suggest acute pulmonary  thromboembolism. Pulmonary trunk is normal caliber. Thoracic aorta is normal caliber. No evident mediastinal hematoma. Lungs:  Lungs are mildly hypoinflated. .    There is a spiculated bordered 1 cm soft tissue density pulmonary nodule in the  junction of apical and anterior segments right upper lobe. There are a 0.4 cm  peripheral pulmonary nodule in the upper posterior basal segment left lower lobe  and 0.6 cm nodule more caudally in the same subsegment. Tiny 0.3 cm peripheral  pulmonary nodule, perifissural lateral basal segment right lower lobe. Other  punctate fissural based nodules multifocally trim right lung probably small  pleural lymph nodes. Minimal curvilinear partial atelectasis or scarring,  superior lingula. Minimal dependent reticular partial atelectasis.     No discrete consolidation, or nodular interstitial thickening. Central airways are patent. Pleura:  No definite mass  or pleural effusion. Mild prominence of subpleural fat and  paracardiac fat. Mediastinum:  No pericardial effusion. Mild cardiomegaly, with multichamber including left  heart enlargement. Few scattered normal sized mediastinal lymph nodes. No evident mediastinal or  hilar lymphadenopathy. Misc:  Large habitus. Multilevel spondylosis, confluent anterior longitudinal ligament ossification. No aggressive/destructive bony lesions are evident acute fracture      Upper abdomen:  Cholecystectomy clips, interlobar fissure. Small hiatal hernia. Prominent  perihilar fat    Impression  1. No CTPA evidence of acute pulmonary thromboembolism. Mild hypoinflation and  dependent partial atelectasis. 2.  No prior chest CT. Spiculated 1 cm pulmonary nodule, junction of apical and  anterior segment right upper lobe, most likely small lung cancer. Other small  and indeterminate pulmonary nodules and probable fissural-based pleural lymph  nodes. 3. Cardiomegaly. Large habitus. Spondylosis.  Cholecystectomy          Joshua Kowalski MD

## 2021-05-24 NOTE — PROGRESS NOTES
[facility] care from ROMANA Gr RN. Shift uneventful. 0715 Bedside and Verbal shift change report given to ROMANA Cheung (oncoming nurse) by Richard Delgado RN   (offgoing nurse). Report included the following information SBAR, Kardex, ED Summary, Procedure Summary, Intake/Output, MAR, Recent Results and Med Rec Status.

## 2021-05-24 NOTE — PROGRESS NOTES
Hospitalist Progress Note-critical care note     Patient: Beny Bettencourt MRN: [id number]  CSN: [id number]    YOB: 1935  Age: 80 y.o. Sex: female    DOA: 5/21/2021 LOS:  LOS: 3 days            Chief complaint: Lung nodule, dyspnea, cellulitis of left foot morbid obesity chest pain hypertension    Assessment/Plan         Hospital Problems  Date Reviewed: 4/29/2015        Codes Class Noted POA    Constipation ICD-10-CM: K59.00  ICD-9-CM: 564.00  5/23/2021 Unknown        Bradycardia ICD-10-CM: R00.1  ICD-9-CM: 427.89  5/21/2021 Unknown        Lung nodule ICD-10-CM: R91.1  ICD-9-CM: 793.11  5/21/2021 Unknown        Dyspnea ICD-10-CM: R06.00  ICD-9-CM: 786.09  5/21/2021 Unknown        Cellulitis of left foot ICD-10-CM: L03.116  ICD-9-CM: 682.7  5/21/2021 Unknown        Morbid obesity ([address] ICD-10-CM: E66.01  ICD-9-CM: 278.01  6/10/2013 Yes        * (Principal) Chest pain ICD-10-CM: R07.9  ICD-9-CM: 786.50  6/9/2013 Unknown        HTN (hypertension) ICD-10-CM: I10  ICD-9-CM: 401.9  6/9/2013 Yes               Chest pain- no chest pain and ce flat  CTA chest negative for PE  Stress test today        Dyspnea  CTA chest no infection  COVID-19 negative      Lung nodule   pulm f/u -planning biopsy        Bradycardia  tsh wnl -hold bbb      Cellulitis of left foot  Improving -planning mri for further evaluation -pt visited podiatrist before     Morbid obesity lifestyle modification     htn continue home medication   gerd ppi     Constipation    Got bm     Subjective: some nausea   rn : hayley     Talked   Tanmay Coelho Daughter [phone]     She was updated and appreciated for the updating. All questions have been answered. 35 total min's spent on patient care including >50% on counseling/coordinating care. Discussed the above assessments. also discussed labs, medications and hospital course      Disposition :tbd,   Review of systems:    General: No fevers or chills.   Cardiovascular: No chest pain or pressure. No palpitations. Pulmonary: No shortness of breath. Gastrointestinal: +nausea, no  vomiting. Vital signs/Intake and Output:  Visit Vitals  BP (!) 148/60   Pulse (!) 52   Temp 98.3 °F (36.8 °C)   Resp 18   Ht 5' 3\" (1.6 m)   Wt 108 kg (238 lb)   SpO2 99%   BMI 42.16 kg/m²     Current Shift:  No intake/output data recorded. Last three shifts:  05/22 1901 - 05/24 0700  In: 480 [P.O.:480]  Out: 2200 [Urine:2200]    Physical Exam:  General: WD, WN. Alert, cooperative, no acute distress    HEENT: NC, Atraumatic. PERRLA, anicteric sclerae. Lungs: CTA Bilaterally. No Wheezing/Rhonchi/Rales. Heart:  Regular  rhythm,  No murmur, No Rubs, No Gallops  Abdomen: Soft, Non distended, Non tender. +Bowel sounds,   Extremities: No c/c, left foot swelling improving, tenderness improving also   Psych:   Not anxious or agitated. Neurologic:  No acute neurological deficit. Labs: Results:       Chemistry Recent Labs     05/24/21 0220 05/23/21  0129 05/22/21  0110   GLU 99 111* 85    138 141   K 4.1 3.5 3.9    105 110   CO2 24 26 24   BUN 16 14 15   CREA 0.90 0.93 0.91   CA 8.3* 8.4* 7.9*   AGAP 9 7 7   BUCR 18 15 16      CBC w/Diff Recent Labs     05/24/21 0220 05/23/21  0129 05/22/21  0110   WBC 8.5 10.1 10.4   RBC 3.34* 3.33* 3.30*   HGB 10.2* 10.1* 10.0*   HCT 31.2* 31.6* 30.6*    233 207   GRANS 60 68 66   LYMPH 22 19* 22   EOS 4 1 2      Cardiac Enzymes Recent Labs     05/22/21  0110 05/21/21  1831    137   CKND1 CALCULATION NOT PERFORMED WHEN RESULT IS BELOW LINEAR LIMIT CALCULATION NOT PERFORMED WHEN RESULT IS BELOW LINEAR LIMIT      Coagulation No results for input(s): PTP, INR, APTT, INREXT, INREXT in the last 72 hours.     Lipid Panel Lab Results   Component Value Date/Time    Cholesterol, total 122 05/22/2021 01:10 AM    HDL Cholesterol 46 05/22/2021 01:10 AM    LDL, calculated 58.6 05/22/2021 01:10 AM    VLDL, calculated 17.4 05/22/2021 01:10 AM    Triglyceride 87 05/22/2021 01:10 AM    CHOL/HDL Ratio 2.7 05/22/2021 01:10 AM      BNP No results for input(s): BNPP in the last 72 hours. Liver Enzymes No results for input(s): TP, ALB, TBIL, AP in the last 72 hours. No lab exists for component: SGOT, GPT, DBIL   Thyroid Studies Lab Results   Component Value Date/Time    TSH 1.61 05/21/2021 06:05 AM    TSH 1.63 05/21/2021 06:05 AM        Procedures/imaging: see electronic medical records for all procedures/Xrays and details which were not copied into this note but were reviewed prior to creation of Plan    XR HIP LT W OR WO PELV 2-3 VWS    Result Date: 5/19/2021  MEDICAL RECORDS NUMBER: [id number] PROCEDURE: 2 views of the pelvis and left hip DATE: 5/19/2021 6:10 PM HISTORY: 80years old Female. left hip pain Comparison: 1/11/2019 FINDINGS: Again demonstrated are postoperative changes related to a left hip prosthesis. There is relatively good anatomic alignment of the hardware without significant interval change in alignment. There is no osseous abnormality of the pelvis or bilateral proximal femurs. 1. No obvious complicating features of the left hip prosthesis. No acute osseous abnormality. This report has been generated using voice recognition software. XR FOOT LT MIN 3 V    Result Date: 5/21/2021  EXAM: LEFT FOOT RADIOGRAPHS CLINICAL INDICATION/HISTORY: foot pain and swelling -Additional: None COMPARISON: 5/19/2021 TECHNIQUE: 3 views of the left foot _______________ FINDINGS: BONES: No evidence of acute fracture or dislocation. Joint spaces and alignment are maintained. No aggressive appearing osseous lytic or blastic lesion. SOFT TISSUES: Soft tissue fullness about the forefoot. _______________     No evidence of acute fracture or dislocation. XR FOOT LT MIN 3 V    Result Date: 5/19/2021  MEDICAL RECORDS NUMBER: [id number] PROCEDURE: 3 views of the left foot. DATE: 5/19/2021 6:14 PM HISTORY: 80years old Female.   pain and swelling Comparison: 7/6/2020 FINDINGS: There is no acute osseous abnormality involving the bones of the hindfoot midfoot or forefoot. There is degenerative osteophytosis noted of the calcaneus. The joint spaces are relatively preserved. Soft tissues are grossly unremarkable. 1. Degenerative calcaneal spurring without evidence of a significant acute osseous abnormality of the left foot. This report has been generated using voice recognition software. CTA CHEST W OR W WO CONT    Result Date: 5/21/2021  CT PA CHEST, PULMONARY ARTERIES History: chest pain and shortness of breath, suspected pulmonary embolism. CT PA technique: Serial axial helical thin section high bolus rate contrast enhanced CT performed from above the diaphragm to just above the aortic arch with nonionic iodine containing IV contrast.  Coronal and sagittal reformations were also performed from axial data to optimize longitudinal assessment for embolus burden and confirm equivocal findings on the axial images. Additional 3-D slab MIP reconstructions were performed. Dose reduction technique: Automated exposure control, adjustment of the mAs and/or kVp according to the patient 's size, and iterative reconstruction techniques were used. Specifics for this study were placed by technologist staff into the patient's electronic medical record. Approximate dose, if determined, reference air kerma: Digital imaging and Communication in Medicine [DICOM] format image data are available to nonaffiliated external healthcare facilities or entities on a secure media free reciprocally searchable basis, with patient authorization, for at least a 12 month period after this study. If not available when requested, the health care system, which is responsible for storage archival and delivery, should be contacted directly. Macro Dose Quality very good No prior CT exams available. Comparison portable chest same date CT findings: Pulmonary Vasc. Delle Degree:  There is no evident hypodense intraluminal thrombus, abrupt vascular cutoff, definite eccentric wall thickening down to the level to suggest acute pulmonary thromboembolism. Pulmonary trunk is normal caliber. Thoracic aorta is normal caliber. No evident mediastinal hematoma. Lungs: Lungs are mildly hypoinflated. . There is a spiculated bordered 1 cm soft tissue density pulmonary nodule in the junction of apical and anterior segments right upper lobe. There are a 0.4 cm peripheral pulmonary nodule in the upper posterior basal segment left lower lobe and 0.6 cm nodule more caudally in the same subsegment. Tiny 0.3 cm peripheral pulmonary nodule, perifissural lateral basal segment right lower lobe. Other punctate fissural based nodules multifocally trim right lung probably small pleural lymph nodes. Minimal curvilinear partial atelectasis or scarring, superior lingula. Minimal dependent reticular partial atelectasis. No discrete consolidation, or nodular interstitial thickening. Central airways are patent. Pleura: No definite mass  or pleural effusion. Mild prominence of subpleural fat and paracardiac fat. Mediastinum: No pericardial effusion. Mild cardiomegaly, with multichamber including left heart enlargement. Few scattered normal sized mediastinal lymph nodes. No evident mediastinal or hilar lymphadenopathy. Misc: Large habitus. Multilevel spondylosis, confluent anterior longitudinal ligament ossification. No aggressive/destructive bony lesions are evident acute fracture Upper abdomen: Cholecystectomy clips, interlobar fissure. Small hiatal hernia. Prominent perihilar fat     1. No CTPA evidence of acute pulmonary thromboembolism. Mild hypoinflation and dependent partial atelectasis. 2.  No prior chest CT. Spiculated 1 cm pulmonary nodule, junction of apical and anterior segment right upper lobe, most likely small lung cancer. Other small and indeterminate pulmonary nodules and probable fissural-based pleural lymph nodes. 3. Cardiomegaly. Large habitus. Spondylosis. Cholecystectomy    XR CHEST PORT    Result Date: 5/21/2021  EXAM: XR CHEST PORT CLINICAL INDICATION/HISTORY: SOB -Additional: None COMPARISON: 7/6/2020 TECHNIQUE: Portable frontal view of the chest _______________ FINDINGS: SUPPORT DEVICES: None. HEART AND MEDIASTINUM: Cardiomediastinal silhouette within normal limits. LUNGS AND PLEURAL SPACES: No dense consolidation, large effusion or pneumothorax. _______________     No acute cardiopulmonary abnormality. DUPLEX LOWER EXT VENOUS LEFT    Result Date: 5/20/2021  · No evidence of deep vein thrombosis in the left lower extremity.         Xu Snyder MD

## 2021-05-24 NOTE — PROGRESS NOTES
Problem: Falls - Risk of  Goal: *Absence of Falls  Description: Document Derek [place] Fall Risk and appropriate interventions in the flowsheet. Outcome: Progressing Towards Goal  Note: Fall Risk Interventions:  Mobility Interventions: Communicate number of staff needed for ambulation/transfer         Medication Interventions: Patient to call before getting OOB, Teach patient to arise slowly    Elimination Interventions: Call light in reach              Problem: Pressure Injury - Risk of  Goal: *Prevention of pressure injury  Description: Document Wyatt Scale and appropriate interventions in the flowsheet.   Outcome: Progressing Towards Goal  Note: Pressure Injury Interventions:  Sensory Interventions: Assess changes in LOC, Minimize linen layers    Moisture Interventions: Absorbent underpads    Activity Interventions: Increase time out of bed, PT/OT evaluation    Mobility Interventions: HOB 30 degrees or less, Pressure redistribution bed/mattress (bed type)    Nutrition Interventions: Document food/fluid/supplement intake, Offer support with meals,snacks and hydration    Friction and Shear Interventions: HOB 30 degrees or less

## 2021-05-24 NOTE — PROGRESS NOTES
Patient refused treatment. No respiratory distress is noted at this time. Patient is comfortable and stable.

## 2021-05-24 NOTE — PROGRESS NOTES
Cardiology Progress Note        Patient: Ciaran Galarza        Sex: female          DOA: 5/21/2021  YOB: 1935      Age:  80 y.o.        LOS:  LOS: 3 days   Assessment/Plan     Principal Problem:    Chest pain (6/9/2013)    Active Problems:    HTN (hypertension) (6/9/2013)      Morbid obesity ([address] (6/10/2013)      Bradycardia (5/21/2021)      Lung nodule (5/21/2021)      Dyspnea (5/21/2021)      Cellulitis of left foot (5/21/2021)      Constipation (5/23/2021)        Plan:  Chest pain    Echocardiogram is normal EF and wall motion  Stress test was negative for ischemia    Mild sinus bradycardia with normal intervals and narrow QRS complexes  No indication for any pacemaker for bradycardia probably consistent with mild sleep apnea  Avoid beta-blocker    Patient may be discharged home from cardiac standpoint tomorrow if stable  All of above discussed in detail with patient. Subjective:    cc:  Chest pain        REVIEW OF SYSTEMS:     General: No fevers or chills. Cardiovascular: No chest pain or pressure. No palpitations. No ankle swelling  Pulmonary: No SOB, orthopnea, PND  Gastrointestinal: No nausea, vomiting or diarrhea      Objective:      Visit Vitals  BP (!) 142/78   Pulse (!) 53   Temp 98.4 °F (36.9 °C)   Resp 18   Ht 5' 3\" (1.6 m)   Wt 108 kg (238 lb)   SpO2 98%   BMI 42.16 kg/m²     Body mass index is 42.16 kg/m². Physical Exam:  General Appearance: Comfortable, not using accessory muscles of respiration. NECK: No JVD, no thyroidomeglay. LUNGS: Clear bilaterally. HEART: S1+S2 audible,    ABD: Non-tender, BS Audible    EXT: No edema, and no cysnosis. VASCULAR EXAM: Pulses are intact. PSYCHIATRIC EXAM: Mood is appropriate.     Medication:  Current Facility-Administered Medications   Medication Dose Route Frequency    ondansetron (ZOFRAN) injection 4 mg  4 mg IntraVENous Q8H PRN    naloxone (NARCAN) injection 0.4 mg  0.4 mg IntraVENous PRN    albuterol-ipratropium (DUO-NEB) 2.5 MG-0.5 MG/3 ML  3 mL Nebulization Q4H PRN    vancomycin (VANCOCIN) 1500 mg in  ml infusion  1,500 mg IntraVENous Q24H    amLODIPine (NORVASC) tablet 5 mg  5 mg Oral DAILY    colchicine tablet 0.6 mg  0.6 mg Oral DAILY    aspirin chewable tablet 81 mg  81 mg Oral DAILY    nitroglycerin (NITROSTAT) tablet 0.4 mg  0.4 mg SubLINGual Q5MIN PRN    acetaminophen (TYLENOL) tablet 650 mg  650 mg Oral Q4H PRN    morphine injection 1 mg  1 mg IntraVENous Q4H PRN    heparin (porcine) injection 5,000 Units  5,000 Units SubCUTAneous Q8H    oxyCODONE-acetaminophen (PERCOCET) 5-325 mg per tablet 1 Tablet  1 Tablet Oral Q4H PRN    pantoprazole (PROTONIX) tablet 40 mg  40 mg Oral ACB    cefTRIAXone (ROCEPHIN) 1 g in sterile water (preservative free) 10 mL IV syringe  1 g IntraVENous Q24H    Saccharomyces boulardii (FLORASTOR) capsule 500 mg  500 mg Oral BID    Vancomycin-Pharmacy to Dose  1 Each Other Rx Dosing/Monitoring               Lab/Data Reviewed:  Procedures/imaging: see electronic medical records for all procedures/Xrays   and details which were not copied into this note but were reviewed prior to creation of Plan       All lab results for the last 24 hours reviewed.      Recent Labs     05/24/21  0220 05/23/21  0129 05/22/21  0110   WBC 8.5 10.1 10.4   HGB 10.2* 10.1* 10.0*   HCT 31.2* 31.6* 30.6*    233 207     Recent Labs     05/24/21  0220 05/23/21  0129 05/22/21  0110    138 141   K 4.1 3.5 3.9    105 110   CO2 24 26 24   GLU 99 111* 85   BUN 16 14 15   CREA 0.90 0.93 0.91   CA 8.3* 8.4* 7.9*       RADIOLOGY:  CT Results  (Last 48 hours)    None        CXR Results  (Last 48 hours)    None            Cardiology Procedures:   Results for orders placed or performed during the hospital encounter of 05/21/21   EKG, 12 LEAD, INITIAL   Result Value Ref Range    Ventricular Rate 57 BPM    Atrial Rate 57 BPM    P-R Interval 176 ms    QRS Duration 84 ms    Q-T Interval 428 ms    QTC Calculation (Bezet) 416 ms    Calculated P Axis 40 degrees    Calculated R Axis -23 degrees    Calculated T Axis 43 degrees    Diagnosis       Sinus bradycardia  Voltage criteria for left ventricular hypertrophy  Abnormal ECG  When compared with ECG of 2020 11:23,  No significant change was found  Confirmed by Terrell Romero MD. ([id number]) on 2021 10:48:31 PM     Results for orders placed or performed during the hospital encounter of 04/29/15   ECG HOLTER MONITOR, UP TO 48 HRS    Narrative                               Holter Monitoring Report                           [facility]                      [address] [address]                                         Test Date:    2015  Darin Smith Name:     Niki Koenig           Department:     Patient ID:   [id number]                Room:           Gender:       FEMALE                   Technician:   Carolina Cardona  :                         Requested By:  Oscar Hatchet, MD  Order Number:                          Reading MD:   Oscar Hatchet, MD                             Interpretive Statements  Briseyda Gregory was in [place] [address]. The average heart rate, excluding ectopy, was 65 BPM with a minimum of 39 BPM  at  08:11D3 and a maximum of 122 BPM at   15:02D1. Heart beats, including ectopy, totaled 146887 beats. VENTRICULAR ECTOPICS totaled 7  averaging  0.2 per hour  with 7 single, 0  paired, 0 trigeminy and 0 R on T.    SUPRAVENTRICULAR ECTOPICS totaled 15  ,with 5 single and 0 paired beats. SUPRAVENTRICULAR TACHYCARDIA occurred 2 times. The fastest run was at 120 BPM and occurred at 10:54D2 with 6 beats. The longest run was 6 beats at 10:54D2 at a rate of 120 BPM.  1. Rhythm is sinus. 2. Change in coduction beats noted. 3. CA normal, variable QRS. 4. (7) ve''s.  5. (5) single sve''s, (2) runs of svt.     Electronically signed on 05-07-15 15:21:33 CDT by Oscar Hatchet, MD      Echo Results  (Last 48 hours)    None       Cardiolite (Tc-99m Sestamibi) stress test    Signed By: Simone Laurent MD     May 24, 2021

## 2021-05-25 LAB
ANION GAP SERPL CALC-SCNC: 9 MMOL/L (ref 3–18)
BASOPHILS # BLD: 0 K/UL (ref 0–0.1)
BASOPHILS NFR BLD: 1 % (ref 0–2)
BUN SERPL-MCNC: 13 MG/DL (ref 7–18)
BUN/CREAT SERPL: 15 (ref 12–20)
CALCIUM SERPL-MCNC: 8.3 MG/DL (ref 8.5–10.1)
CHLORIDE SERPL-SCNC: 109 MMOL/L (ref 100–111)
CO2 SERPL-SCNC: 25 MMOL/L (ref 21–32)
CREAT SERPL-MCNC: 0.89 MG/DL (ref 0.6–1.3)
DIFFERENTIAL METHOD BLD: ABNORMAL
EOSINOPHIL # BLD: 0.3 K/UL (ref 0–0.4)
EOSINOPHIL NFR BLD: 4 % (ref 0–5)
ERYTHROCYTE [DISTWIDTH] IN BLOOD BY AUTOMATED COUNT: 13.7 % (ref 11.6–14.5)
GLUCOSE SERPL-MCNC: 87 MG/DL (ref 74–99)
HCT VFR BLD AUTO: 32.2 % (ref 35–45)
HGB BLD-MCNC: 10.3 G/DL (ref 12–16)
LYMPHOCYTES # BLD: 1.7 K/UL (ref 0.9–3.6)
LYMPHOCYTES NFR BLD: 25 % (ref 21–52)
MCH RBC QN AUTO: 29.9 PG (ref 24–34)
MCHC RBC AUTO-ENTMCNC: 32 G/DL (ref 31–37)
MCV RBC AUTO: 93.6 FL (ref 74–97)
MONOCYTES # BLD: 0.9 K/UL (ref 0.05–1.2)
MONOCYTES NFR BLD: 13 % (ref 3–10)
NEUTS SEG # BLD: 3.9 K/UL (ref 1.8–8)
NEUTS SEG NFR BLD: 57 % (ref 40–73)
PLATELET # BLD AUTO: 307 K/UL (ref 135–420)
PMV BLD AUTO: 9.8 FL (ref 9.2–11.8)
POTASSIUM SERPL-SCNC: 3.8 MMOL/L (ref 3.5–5.5)
RBC # BLD AUTO: 3.44 M/UL (ref 4.2–5.3)
SODIUM SERPL-SCNC: 143 MMOL/L (ref 136–145)
WBC # BLD AUTO: 6.9 K/UL (ref 4.6–13.2)

## 2021-05-25 PROCEDURE — 97530 THERAPEUTIC ACTIVITIES: CPT

## 2021-05-25 PROCEDURE — 96376 TX/PRO/DX INJ SAME DRUG ADON: CPT

## 2021-05-25 PROCEDURE — 97162 PT EVAL MOD COMPLEX 30 MIN: CPT

## 2021-05-25 PROCEDURE — 74011000250 HC RX REV CODE- 250: Performed by: HOSPITALIST

## 2021-05-25 PROCEDURE — 85025 COMPLETE CBC W/AUTO DIFF WBC: CPT

## 2021-05-25 PROCEDURE — 74011250636 HC RX REV CODE- 250/636: Performed by: HOSPITALIST

## 2021-05-25 PROCEDURE — 80048 BASIC METABOLIC PNL TOTAL CA: CPT

## 2021-05-25 PROCEDURE — 36415 COLL VENOUS BLD VENIPUNCTURE: CPT

## 2021-05-25 PROCEDURE — 96372 THER/PROPH/DIAG INJ SC/IM: CPT

## 2021-05-25 PROCEDURE — 99218 HC RM OBSERVATION: CPT

## 2021-05-25 PROCEDURE — 74011250637 HC RX REV CODE- 250/637: Performed by: HOSPITALIST

## 2021-05-25 RX ADMIN — OXYCODONE HYDROCHLORIDE AND ACETAMINOPHEN 1 TABLET: 5; 325 TABLET ORAL at 09:08

## 2021-05-25 RX ADMIN — VANCOMYCIN HYDROCHLORIDE 1500 MG: 10 INJECTION, POWDER, LYOPHILIZED, FOR SOLUTION INTRAVENOUS at 17:51

## 2021-05-25 RX ADMIN — WATER 1 G: 1 INJECTION INTRAMUSCULAR; INTRAVENOUS; SUBCUTANEOUS at 13:14

## 2021-05-25 RX ADMIN — HEPARIN SODIUM 5000 UNITS: 5000 INJECTION INTRAVENOUS; SUBCUTANEOUS at 21:18

## 2021-05-25 RX ADMIN — ASPIRIN 81 MG: 81 TABLET, CHEWABLE ORAL at 09:08

## 2021-05-25 RX ADMIN — HEPARIN SODIUM 5000 UNITS: 5000 INJECTION INTRAVENOUS; SUBCUTANEOUS at 13:14

## 2021-05-25 RX ADMIN — Medication 500 MG: at 21:18

## 2021-05-25 RX ADMIN — PANTOPRAZOLE SODIUM 40 MG: 40 TABLET, DELAYED RELEASE ORAL at 06:34

## 2021-05-25 RX ADMIN — COLCHICINE 0.6 MG: 0.6 TABLET, FILM COATED ORAL at 09:08

## 2021-05-25 RX ADMIN — OXYCODONE HYDROCHLORIDE AND ACETAMINOPHEN 1 TABLET: 5; 325 TABLET ORAL at 00:30

## 2021-05-25 RX ADMIN — Medication 500 MG: at 09:08

## 2021-05-25 RX ADMIN — AMLODIPINE BESYLATE 5 MG: 5 TABLET ORAL at 09:08

## 2021-05-25 RX ADMIN — OXYCODONE HYDROCHLORIDE AND ACETAMINOPHEN 1 TABLET: 5; 325 TABLET ORAL at 13:14

## 2021-05-25 RX ADMIN — HEPARIN SODIUM 5000 UNITS: 5000 INJECTION INTRAVENOUS; SUBCUTANEOUS at 05:08

## 2021-05-25 NOTE — PROGRESS NOTES
Hospitalist Progress Note-critical care note     Patient: Angélica Raya MRN: [id number]  CSN: [id number]    YOB: 1935  Age: 80 y.o. Sex: female    DOA: 5/21/2021 LOS:  LOS: 3 days            Chief complaint: Lung nodule, dyspnea, cellulitis of left foot morbid obesity chest pain hypertension    Assessment/Plan         Hospital Problems  Date Reviewed: 4/29/2015        Codes Class Noted POA    Constipation ICD-10-CM: K59.00  ICD-9-CM: 564.00  5/23/2021 Unknown        Bradycardia ICD-10-CM: R00.1  ICD-9-CM: 427.89  5/21/2021 Unknown        Lung nodule ICD-10-CM: R91.1  ICD-9-CM: 793.11  5/21/2021 Unknown        Dyspnea ICD-10-CM: R06.00  ICD-9-CM: 786.09  5/21/2021 Unknown        Cellulitis of left foot ICD-10-CM: L03.116  ICD-9-CM: 682.7  5/21/2021 Unknown        Morbid obesity ([facility] [address] ICD-10-CM: E66.01  ICD-9-CM: 278.01  6/10/2013 Yes        * (Principal) Chest pain ICD-10-CM: R07.9  ICD-9-CM: 786.50  6/9/2013 Unknown        HTN (hypertension) ICD-10-CM: I10  ICD-9-CM: 401.9  6/9/2013 Yes               Chest pain- no chest pain and ce flat  CTA chest negative for PE  Stress test was negative for ischemic         Dyspnea-resolved   CTA chest no infection  COVID-19 negative      Lung nodule   pulm f/u -need out-pt f/u with Dr. Melissa Germain         Bradycardia  tsh wnl -hold bbb      Cellulitis of left foot  Improving -mri no fracture      Morbid obesity lifestyle modification     htn continue home medication   gerd ppi     Constipation    Got bm     Subjective: feel tired    rn : hayley     Will have pt/ot     Disposition :tomorrow   Review of systems:    General: No fevers or chills. Cardiovascular: No chest pain or pressure. No palpitations. Pulmonary: No shortness of breath. Gastrointestinal: no nausea, no  vomiting.     Vital signs/Intake and Output:  Visit Vitals  BP (!) 155/63   Pulse (!) 50   Temp 98.2 °F (36.8 °C)   Resp 18   Ht 5' 3\" (1.6 m)   Wt 108 kg (238 lb)   SpO2 95%   BMI 42.16 kg/m²     Current Shift:  No intake/output data recorded. Last three shifts:  05/23 1901 - 05/25 0700  In: 840 [P.O.:840]  Out: 500 [Urine:500]    Physical Exam:  General: WD, WN. Alert, cooperative, no acute distress    HEENT: NC, Atraumatic. PERRLA, anicteric sclerae. Lungs: CTA Bilaterally. No Wheezing/Rhonchi/Rales. Heart:  Regular  rhythm,  No murmur, No Rubs, No Gallops  Abdomen: Soft, Non distended, Non tender. +Bowel sounds,   Extremities: No c/c, left foot swelling improving  Psych:   Not anxious or agitated. Neurologic:  No acute neurological deficit. Labs: Results:       Chemistry Recent Labs     05/25/21 0305 05/24/21 0220 05/23/21  0129   GLU 87 99 111*    143 138   K 3.8 4.1 3.5    110 105   CO2 25 24 26   BUN 13 16 14   CREA 0.89 0.90 0.93   CA 8.3* 8.3* 8.4*   AGAP 9 9 7   BUCR 15 18 15      CBC w/Diff Recent Labs     05/25/21 0305 05/24/21 0220 05/23/21  0129   WBC 6.9 8.5 10.1   RBC 3.44* 3.34* 3.33*   HGB 10.3* 10.2* 10.1*   HCT 32.2* 31.2* 31.6*    293 233   GRANS 57 60 68   LYMPH 25 22 19*   EOS 4 4 1      Cardiac Enzymes No results for input(s): CPK, CKND1, ZULEIKA in the last 72 hours. No lab exists for component: CKRMB, TROIP   Coagulation No results for input(s): PTP, INR, APTT, INREXT, INREXT in the last 72 hours. Lipid Panel Lab Results   Component Value Date/Time    Cholesterol, total 122 05/22/2021 01:10 AM    HDL Cholesterol 46 05/22/2021 01:10 AM    LDL, calculated 58.6 05/22/2021 01:10 AM    VLDL, calculated 17.4 05/22/2021 01:10 AM    Triglyceride 87 05/22/2021 01:10 AM    CHOL/HDL Ratio 2.7 05/22/2021 01:10 AM      BNP No results for input(s): BNPP in the last 72 hours. Liver Enzymes No results for input(s): TP, ALB, TBIL, AP in the last 72 hours.     No lab exists for component: SGOT, GPT, DBIL   Thyroid Studies Lab Results   Component Value Date/Time    TSH 1.61 05/21/2021 06:05 AM    TSH 1.63 05/21/2021 06:05 AM Procedures/imaging: see electronic medical records for all procedures/Xrays and details which were not copied into this note but were reviewed prior to creation of Plan    XR HIP LT W OR WO PELV 2-3 VWS    Result Date: 5/19/2021  MEDICAL RECORDS NUMBER: [id number] PROCEDURE: 2 views of the pelvis and left hip DATE: 5/19/2021 6:10 PM HISTORY: 80years old Female. left hip pain Comparison: 1/11/2019 FINDINGS: Again demonstrated are postoperative changes related to a left hip prosthesis. There is relatively good anatomic alignment of the hardware without significant interval change in alignment. There is no osseous abnormality of the pelvis or bilateral proximal femurs. 1. No obvious complicating features of the left hip prosthesis. No acute osseous abnormality. This report has been generated using voice recognition software. XR FOOT LT MIN 3 V    Result Date: 5/21/2021  EXAM: LEFT FOOT RADIOGRAPHS CLINICAL INDICATION/HISTORY: foot pain and swelling -Additional: None COMPARISON: 5/19/2021 TECHNIQUE: 3 views of the left foot _______________ FINDINGS: BONES: No evidence of acute fracture or dislocation. Joint spaces and alignment are maintained. No aggressive appearing osseous lytic or blastic lesion. SOFT TISSUES: Soft tissue fullness about the forefoot. _______________     No evidence of acute fracture or dislocation. XR FOOT LT MIN 3 V    Result Date: 5/19/2021  MEDICAL RECORDS NUMBER: [id number] PROCEDURE: 3 views of the left foot. DATE: 5/19/2021 6:14 PM HISTORY: 80years old Female. pain and swelling Comparison: 7/6/2020 FINDINGS: There is no acute osseous abnormality involving the bones of the hindfoot midfoot or forefoot. There is degenerative osteophytosis noted of the calcaneus. The joint spaces are relatively preserved. Soft tissues are grossly unremarkable. 1. Degenerative calcaneal spurring without evidence of a significant acute osseous abnormality of the left foot.  This report has been generated using voice recognition software. CTA CHEST W OR W WO CONT    Result Date: 5/21/2021  CT PA CHEST, PULMONARY ARTERIES History: chest pain and shortness of breath, suspected pulmonary embolism. CT PA technique: Serial axial helical thin section high bolus rate contrast enhanced CT performed from above the diaphragm to just above the aortic arch with nonionic iodine containing IV contrast.  Coronal and sagittal reformations were also performed from axial data to optimize longitudinal assessment for embolus burden and confirm equivocal findings on the axial images. Additional 3-D slab MIP reconstructions were performed. Dose reduction technique: Automated exposure control, adjustment of the mAs and/or kVp according to the patient 's size, and iterative reconstruction techniques were used. Specifics for this study were placed by technologist staff into the patient's electronic medical record. Approximate dose, if determined, reference air kerma: Digital imaging and Communication in Medicine [DICOM] format image data are available to nonaffiliated external healthcare facilities or entities on a secure media free reciprocally searchable basis, with patient authorization, for at least a 12 month period after this study. If not available when requested, the health care system, which is responsible for storage archival and delivery, should be contacted directly. Macro Dose Quality very good No prior CT exams available. Comparison portable chest same date CT findings: Pulmonary Vasc. Radu Honey: There is no evident hypodense intraluminal thrombus, abrupt vascular cutoff, definite eccentric wall thickening down to the level to suggest acute pulmonary thromboembolism. Pulmonary trunk is normal caliber. Thoracic aorta is normal caliber. No evident mediastinal hematoma. Lungs: Lungs are mildly hypoinflated. . There is a spiculated bordered 1 cm soft tissue density pulmonary nodule in the junction of apical and anterior segments right upper lobe. There are a 0.4 cm peripheral pulmonary nodule in the upper posterior basal segment left lower lobe and 0.6 cm nodule more caudally in the same subsegment. Tiny 0.3 cm peripheral pulmonary nodule, perifissural lateral basal segment right lower lobe. Other punctate fissural based nodules multifocally trim right lung probably small pleural lymph nodes. Minimal curvilinear partial atelectasis or scarring, superior lingula. Minimal dependent reticular partial atelectasis. No discrete consolidation, or nodular interstitial thickening. Central airways are patent. Pleura: No definite mass  or pleural effusion. Mild prominence of subpleural fat and paracardiac fat. Mediastinum: No pericardial effusion. Mild cardiomegaly, with multichamber including left heart enlargement. Few scattered normal sized mediastinal lymph nodes. No evident mediastinal or hilar lymphadenopathy. Misc: Large habitus. Multilevel spondylosis, confluent anterior longitudinal ligament ossification. No aggressive/destructive bony lesions are evident acute fracture Upper abdomen: Cholecystectomy clips, interlobar fissure. Small hiatal hernia. Prominent perihilar fat     1. No CTPA evidence of acute pulmonary thromboembolism. Mild hypoinflation and dependent partial atelectasis. 2.  No prior chest CT. Spiculated 1 cm pulmonary nodule, junction of apical and anterior segment right upper lobe, most likely small lung cancer. Other small and indeterminate pulmonary nodules and probable fissural-based pleural lymph nodes. 3. Cardiomegaly. Large habitus. Spondylosis. Cholecystectomy    XR CHEST PORT    Result Date: 5/21/2021  EXAM: XR CHEST PORT CLINICAL INDICATION/HISTORY: SOB -Additional: None COMPARISON: 7/6/2020 TECHNIQUE: Portable frontal view of the chest _______________ FINDINGS: SUPPORT DEVICES: None. HEART AND MEDIASTINUM: Cardiomediastinal silhouette within normal limits.  LUNGS AND PLEURAL SPACES: No dense consolidation, large effusion or pneumothorax. _______________     No acute cardiopulmonary abnormality. DUPLEX LOWER EXT VENOUS LEFT    Result Date: 5/20/2021  · No evidence of deep vein thrombosis in the left lower extremity.         Nigel Morris MD

## 2021-05-25 NOTE — PROGRESS NOTES
Problem: Falls - Risk of  Goal: *Absence of Falls  Description: Document Michell العراقي Fall Risk and appropriate interventions in the flowsheet. Outcome: Progressing Towards Goal  Note: Fall Risk Interventions:  Mobility Interventions: Bed/chair exit alarm         Medication Interventions: Teach patient to arise slowly, Patient to call before getting OOB    Elimination Interventions: Call light in reach              Problem: Patient Education: Go to Patient Education Activity  Goal: Patient/Family Education  Outcome: Progressing Towards Goal     Problem: Pressure Injury - Risk of  Goal: *Prevention of pressure injury  Description: Document Wyatt Scale and appropriate interventions in the flowsheet.   Outcome: Progressing Towards Goal  Note: Pressure Injury Interventions:  Sensory Interventions: Assess changes in LOC    Moisture Interventions: Absorbent underpads    Activity Interventions: Assess need for specialty bed    Mobility Interventions: Assess need for specialty bed    Nutrition Interventions: Document food/fluid/supplement intake    Friction and Shear Interventions: HOB 30 degrees or less                Problem: Patient Education: Go to Patient Education Activity  Goal: Patient/Family Education  Outcome: Progressing Towards Goal     Problem: Pain  Goal: *Control of Pain  Outcome: Progressing Towards Goal  Goal: *PALLIATIVE CARE:  Alleviation of Pain  Outcome: Progressing Towards Goal     Problem: Patient Education: Go to Patient Education Activity  Goal: Patient/Family Education  Outcome: Progressing Towards Goal     Problem: Cellulitis Care Plan (Adult)  Goal: *Control of acute pain  Outcome: Progressing Towards Goal  Goal: *Skin integrity maintained  Outcome: Progressing Towards Goal  Goal: *Absence of infection signs and symptoms  Outcome: Progressing Towards Goal

## 2021-05-25 NOTE — PROGRESS NOTES
[facility] 36. care at this time. 2117 - Patient A&Ox4, RA. Denies chest pain and SOB. Lung sounds clear. Pain 3/10 to left foot, declines pain medication at this time. Redness/swelling/warmth to right foot, tenderness with palpation. Pt educated on pain management No concerns voiced. Call bell within reach, bed in lowest position. Pt encouraged to call for assistance. 4911 - Patient rated pain 8/10, pain medication administered per MAR. No other concerns voiced at this time. Call bell within reach, bed in lowest position. Pt encouraged to use call bell for any needs.

## 2021-05-25 NOTE — PROGRESS NOTES
Problem: Mobility Impaired (Adult and Pediatric)  Goal: *Acute Goals and Plan of Care (Insert Text)  Description: Physical Therapy short term goals initiated 05/25/21, to be achieved in 3-7 days. Pt will:   1. Perform bed mobility with indep in prep for OOB activity. 2. Perform sit <> stand transfers with RW and S in prep for functional mobility and ambulation. 3. Ambulate 100 ft with RW and S in prep for household and community mobility. 4. Transfer bed <> chair with RW and S in prep for OOB activity and ADL completion. Note:     PHYSICAL THERAPY EVALUATION    Patient: Darlin Shelby (36 y.o. female)  Date: 5/25/2021  Primary Diagnosis: Chest pain [R07.9]        Precautions:  Fall  PLOF: Pt was independent with household and community mobility with no AD PTA. Pt lives alone in single story condo with 1STE. ASSESSMENT :  Based on the objective data described below, the patient presents with decr B LE strength, decr activity tolerance, decr balance, and L foot pain resulting in deficits in bed mobility, transfers, and gait quality and tolerance. Pt supine in bed on PT entry, rated L foot pain 6/10. Pt agreeable to participate in gait training in room, reports she has been transferring to [facility] with indep. Pt performed bed mobility with additional time. Pt demonstrated good balance sitting EOB with no UE support. Pt performed STS with RW and CGA, vc for safe hand placement. Pt amb in room with RW/CGA with incr forward trunk flexion and antalgic pattern with incr distance, incr in L foot pain reported. Pt returned to supine in bed, activity tolerance limited by L foot pain, rated 9/10 after gait trial, GERALDO Terry notified. Pt left supine in bed with all needs met and all questions answered. Patient will benefit from skilled intervention to address the above impairments.   Patient's rehabilitation potential is considered to be Good  Factors which may influence rehabilitation potential include:   [] None noted  []         Mental ability/status  []         Medical condition  []         Home/family situation and support systems  []         Safety awareness  [x]         Pain tolerance/management  []         Other:      PLAN :  Recommendations and Planned Interventions:   [x]           Bed Mobility Training             []    Neuromuscular Re-Education  [x]           Transfer Training                   []    Orthotic/Prosthetic Training  [x]           Gait Training                          []    Modalities  [x]           Therapeutic Exercises           []    Edema Management/Control  [x]           Therapeutic Activities            [x]    Family Training/Education  [x]           Patient Education  []           Other (comment):    Frequency/Duration: Patient will be followed by physical therapy 1-2 times per day/4-7 days per week to address goals. Discharge Recommendations: Home Health  Further Equipment Recommendations for Discharge: N/A     SUBJECTIVE:   Patient stated i'm used to being independent. I don't want to have to rely on others.     OBJECTIVE DATA SUMMARY:     Past Medical History:   Diagnosis Date    Arthritis     osteo    Asthma     inhaler as needed - every 2 to 3 years    Chronic pain     knee and hip ,shoulder     GERD (gastroesophageal reflux disease)     Hypertension     5yrs    Irritable bowel     Other ill-defined conditions(799.89)     h/o migraines    Other ill-defined conditions(799.89)     h/o dizzy    Other ill-defined conditions(799.89)     IBS; hiatal hernia    Psychiatric disorder     depression    Stroke ([facility] [place] [address] 9 years ago     stroke - no deficits    Stroke ([facility] [place] [address]     In 1997/98     Past Surgical History:   Procedure Laterality Date    HX CHOLECYSTECTOMY      HX HERNIA REPAIR      umbilical twice    HX HYSTERECTOMY      HX INTRAOCULAR LENS PROSTHESIS      HX KNEE ARTHROSCOPY      left    HX KNEE REPLACEMENT Left 2013    HX OTHER SURGICAL      herniated disc surgery    HX TUBAL LIGATION      LAP UMBILICAL HERNIA REPAIR      NEUROLOGICAL PROCEDURE UNLISTED      lower back    DC CARDIAC SURG PROCEDURE UNLIST      DC LAP,CHOLECYSTECTOMY       Barriers to Learning/Limitations: None  Compensate with: N/A  Home Situation:  Home Situation  Home Environment: Private residence  # Steps to Enter: 1  One/Two Story Residence: One story  Living Alone: Yes  Support Systems: Child(ngoc), Friends \ neighbors  Patient Expects to be Discharged to[de-identified] Private residence  Current DME Used/Available at Home: Shilpa Jonathan, rollator, Grab bars, Shower chair  Critical Behavior:  Neurologic State: Alert  Orientation Level: Oriented X4  Cognition: Appropriate for age attention/concentration; Follows commands  Safety/Judgement: Fall prevention;Home safety; Insight into deficits  Psychosocial  Patient Behaviors: Calm; Cooperative  Strength:    Strength: Generally decreased, functional  Tone & Sensation:   Tone: Normal  Range Of Motion:  AROM: Generally decreased, functional  Functional Mobility:  Bed Mobility:  Supine to Sit: Stand-by assistance; Additional time  Sit to Supine: Stand-by assistance  Transfers:  Sit to Stand: Contact guard assistance  Stand to Sit: Contact guard assistance (vc)  Balance:   Sitting: Intact  Standing: Intact; With support  Ambulation/Gait Training:  Distance (ft): 20 Feet (ft)  Assistive Device: Gait belt;Walker, rolling  Ambulation - Level of Assistance: Contact guard assistance  Gait Abnormalities: Decreased step clearance; Antalgic (forward trunk flexion)  Stance: Left decreased  Speed/Rosamaria: Slow  Step Length: Left shortened;Right shortened  Interventions: Safety awareness training; Tactile cues; Verbal cues  Pain:  Pain level pre-treatment: 6/10   Pain level post-treatment: 9/10   Pain Intervention(s) : Medication (see MAR);  Rest, Ice, Repositioning  Response to intervention: Nurse notified, See doc flow  Activity Tolerance:   Pt with limited activity tolerance d/t L foot pain, incr to 9/10 with gait trial.   Please refer to the flowsheet for vital signs taken during this treatment. After treatment:   []         Patient left in no apparent distress sitting up in chair  [x]         Patient left in no apparent distress in bed  [x]         Call bell left within reach  [x]         Nursing notified  []         Caregiver present  []         Bed alarm activated  []         SCDs applied    COMMUNICATION/EDUCATION:   [x]         Role of Physical Therapy in the acute care setting. [x]         Fall prevention education was provided and the patient/caregiver indicated understanding. [x]         Patient/family have participated as able in goal setting and plan of care. [x]         Patient/family agree to work toward stated goals and plan of care. []         Patient understands intent and goals of therapy, but is neutral about his/her participation. []         Patient is unable to participate in goal setting/plan of care: ongoing with therapy staff.  []         Other:     Thank you for this referral.  Esposito Dry   Time Calculation: 23 mins      Eval Complexity: History: MEDIUM  Complexity : 1-2 comorbidities / personal factors will impact the outcome/ POC Exam:MEDIUM Complexity : 3 Standardized tests and measures addressing body structure, function, activity limitation and / or participation in recreation  Presentation: MEDIUM Complexity : Evolving with changing characteristics  Clinical Decision Making:Medium Complexity    Overall Complexity:MEDIUM

## 2021-05-25 NOTE — PROGRESS NOTES
2913 : Bedside shift change report given to Lazara Bautista RN (oncoming nurse) by Shashi Jackson RN (offgoing nurse). Report included the following information SBAR, Kardex, Intake/Output and MAR.   [id number] : patient had an uneventful shift, no new clinical changes. 1935 : Bedside shift change report given to [address] (oncoming nurse) by Lazara Bautista RN (offgoing nurse). Report included the following information SBAR, Kardex, Intake/Output and MAR.

## 2021-05-25 NOTE — PROGRESS NOTES
Cardiology Progress Note        Patient: Jimbo Jackson        Sex: female          DOA: 5/21/2021  YOB: 1935      Age:  80 y.o.        LOS:  LOS: 3 days   Assessment/Plan     Principal Problem:    Chest pain (6/9/2013)    Active Problems:    HTN (hypertension) (6/9/2013)      Morbid obesity ([address] (6/10/2013)      Bradycardia (5/21/2021)      Lung nodule (5/21/2021)      Dyspnea (5/21/2021)      Cellulitis of left foot (5/21/2021)      Constipation (5/23/2021)        Plan:  Sinus rhythm with intermittent sinus bradycardia with normal blood pressure  Chest pain     bradycardia      Stress test is negative for ischemia  Bradycardia is normal sinus bradycardia while sleeping probably due to sleep apnea      No further cardiac testing is suggested. I will sign off, please call if you have any questions or concerns  Management of ankle cellulitis as per hospitalist team                Subjective:    cc:  Chest pain bradycardia          REVIEW OF SYSTEMS:   Pain in ankle cellulitis site  General: No fevers or chills. Cardiovascular: No chest pain or pressure. No palpitations. No ankle swelling  Pulmonary: No SOB, orthopnea, PND  Gastrointestinal: No nausea, vomiting or diarrhea      Objective:      Visit Vitals  BP (!) 154/70   Pulse (!) 52   Temp 98.4 °F (36.9 °C)   Resp 18   Ht 5' 3\" (1.6 m)   Wt 108 kg (238 lb)   SpO2 96%   BMI 42.16 kg/m²     Body mass index is 42.16 kg/m². Physical Exam:  General Appearance: Comfortable, not using accessory muscles of respiration. NECK: No JVD, no thyroidomeglay. LUNGS: Clear bilaterally. HEART: S1+S2 audible,    ABD: Non-tender, BS Audible    EXT: No edema, and no cysnosis. VASCULAR EXAM: Pulses are intact. PSYCHIATRIC EXAM: Mood is appropriate.     Medication:  Current Facility-Administered Medications   Medication Dose Route Frequency    ondansetron (ZOFRAN) injection 4 mg  4 mg IntraVENous Q8H PRN    naloxone (NARCAN) injection 0.4 mg  0.4 mg IntraVENous PRN    albuterol-ipratropium (DUO-NEB) 2.5 MG-0.5 MG/3 ML  3 mL Nebulization Q4H PRN    vancomycin (VANCOCIN) 1500 mg in  ml infusion  1,500 mg IntraVENous Q24H    amLODIPine (NORVASC) tablet 5 mg  5 mg Oral DAILY    colchicine tablet 0.6 mg  0.6 mg Oral DAILY    aspirin chewable tablet 81 mg  81 mg Oral DAILY    nitroglycerin (NITROSTAT) tablet 0.4 mg  0.4 mg SubLINGual Q5MIN PRN    acetaminophen (TYLENOL) tablet 650 mg  650 mg Oral Q4H PRN    morphine injection 1 mg  1 mg IntraVENous Q4H PRN    heparin (porcine) injection 5,000 Units  5,000 Units SubCUTAneous Q8H    oxyCODONE-acetaminophen (PERCOCET) 5-325 mg per tablet 1 Tablet  1 Tablet Oral Q4H PRN    pantoprazole (PROTONIX) tablet 40 mg  40 mg Oral ACB    cefTRIAXone (ROCEPHIN) 1 g in sterile water (preservative free) 10 mL IV syringe  1 g IntraVENous Q24H    Saccharomyces boulardii (FLORASTOR) capsule 500 mg  500 mg Oral BID    Vancomycin-Pharmacy to Dose  1 Each Other Rx Dosing/Monitoring               Lab/Data Reviewed:  Procedures/imaging: see electronic medical records for all procedures/Xrays   and details which were not copied into this note but were reviewed prior to creation of Plan       All lab results for the last 24 hours reviewed.      Recent Labs     05/25/21  0305 05/24/21  0220 05/23/21  0129   WBC 6.9 8.5 10.1   HGB 10.3* 10.2* 10.1*   HCT 32.2* 31.2* 31.6*    293 233     Recent Labs     05/25/21  0305 05/24/21  0220 05/23/21  0129    143 138   K 3.8 4.1 3.5    110 105   CO2 25 24 26   GLU 87 99 111*   BUN 13 16 14   CREA 0.89 0.90 0.93   CA 8.3* 8.3* 8.4*       RADIOLOGY:  CT Results  (Last 48 hours)    None        CXR Results  (Last 48 hours)    None            Cardiology Procedures:   Results for orders placed or performed during the hospital encounter of 05/21/21   EKG, 12 LEAD, INITIAL   Result Value Ref Range    Ventricular Rate 57 BPM    Atrial Rate 57 BPM P-R Interval 176 ms    QRS Duration 84 ms    Q-T Interval 428 ms    QTC Calculation (Bezet) 416 ms    Calculated P Axis 40 degrees    Calculated R Axis -23 degrees    Calculated T Axis 43 degrees    Diagnosis       Sinus bradycardia  Voltage criteria for left ventricular hypertrophy  Abnormal ECG  When compared with ECG of 2020 11:23,  No significant change was found  Confirmed by Nalini Hernández MD. ([id number]) on 2021 10:48:31 PM     Results for orders placed or performed during the hospital encounter of 04/29/15   ECG HOLTER MONITOR, UP TO 48 HRS    Narrative                               Holter Monitoring Report                           [facility]                      [address] [address]                                         Test Date:    2015  Tate Shetty Name:     Catherine June           Department:     Patient ID:   [id number]                Room:           Gender:       FEMALE                   Technician:   Shima Napier  :                         Requested By:  Chelsi Avila MD  Order Number:                          Reading MD:   Chelsi Avila MD                             Interpretive Statements  Edd Vilchis was in [place] [address]. The average heart rate, excluding ectopy, was 65 BPM with a minimum of 39 BPM  at  08:11D3 and a maximum of 122 BPM at   15:02D1. Heart beats, including ectopy, totaled 089617 beats. VENTRICULAR ECTOPICS totaled 7  averaging  0.2 per hour  with 7 single, 0  paired, 0 trigeminy and 0 R on T.    SUPRAVENTRICULAR ECTOPICS totaled 15  ,with 5 single and 0 paired beats. SUPRAVENTRICULAR TACHYCARDIA occurred 2 times. The fastest run was at 120 BPM and occurred at 10:54D2 with 6 beats. The longest run was 6 beats at 10:54D2 at a rate of 120 BPM.  1. Rhythm is sinus. 2. Change in coduction beats noted. 3. ID normal, variable QRS. 4. (7) ve''s.  5. (5) single sve''s, (2) runs of svt.     Electronically signed on 05-07-15 15:21:33 CDT by Argelia Costa MD      Echo Results  (Last 48 hours)    None       Cardiolite (Tc-99m Sestamibi) stress test    Signed By: Rene Bello MD     May 25, 2021

## 2021-05-25 NOTE — PROGRESS NOTES
Problem: Falls - Risk of  Goal: *Absence of Falls  Description: Document Onalee Gum Fall Risk and appropriate interventions in the flowsheet. Outcome: Progressing Towards Goal  Note: Fall Risk Interventions:  Mobility Interventions: Assess mobility with egress test, PT Consult for mobility concerns         Medication Interventions: Teach patient to arise slowly, Patient to call before getting OOB    Elimination Interventions: Call light in reach              Problem: Pressure Injury - Risk of  Goal: *Prevention of pressure injury  Description: Document Wyatt Scale and appropriate interventions in the flowsheet.   Outcome: Progressing Towards Goal  Note: Pressure Injury Interventions:  Sensory Interventions: Assess changes in LOC, Minimize linen layers    Moisture Interventions: Absorbent underpads, Limit adult briefs    Activity Interventions: Assess need for specialty bed, PT/OT evaluation    Mobility Interventions: Assess need for specialty bed, PT/OT evaluation    Nutrition Interventions: Document food/fluid/supplement intake    Friction and Shear Interventions: Apply protective barrier, creams and emollients, Minimize layers

## 2021-05-26 ENCOUNTER — HOME HEALTH ADMISSION (OUTPATIENT)
Dept: HOME HEALTH SERVICES | Facility: HOME HEALTH | Age: 86
End: 2021-05-26
Payer: MEDICARE

## 2021-05-26 VITALS
TEMPERATURE: 98.3 F | BODY MASS INDEX: 42.17 KG/M2 | HEART RATE: 89 BPM | DIASTOLIC BLOOD PRESSURE: 57 MMHG | WEIGHT: 238 LBS | HEIGHT: 63 IN | RESPIRATION RATE: 18 BRPM | SYSTOLIC BLOOD PRESSURE: 170 MMHG | OXYGEN SATURATION: 100 %

## 2021-05-26 LAB
ANION GAP SERPL CALC-SCNC: 8 MMOL/L (ref 3–18)
BUN SERPL-MCNC: 12 MG/DL (ref 7–18)
BUN/CREAT SERPL: 14 (ref 12–20)
CALCIUM SERPL-MCNC: 8.5 MG/DL (ref 8.5–10.1)
CHLORIDE SERPL-SCNC: 110 MMOL/L (ref 100–111)
CO2 SERPL-SCNC: 24 MMOL/L (ref 21–32)
CREAT SERPL-MCNC: 0.86 MG/DL (ref 0.6–1.3)
GLUCOSE SERPL-MCNC: 108 MG/DL (ref 74–99)
POTASSIUM SERPL-SCNC: 3.7 MMOL/L (ref 3.5–5.5)
SODIUM SERPL-SCNC: 142 MMOL/L (ref 136–145)

## 2021-05-26 PROCEDURE — 74011000250 HC RX REV CODE- 250: Performed by: HOSPITALIST

## 2021-05-26 PROCEDURE — 36415 COLL VENOUS BLD VENIPUNCTURE: CPT

## 2021-05-26 PROCEDURE — 97116 GAIT TRAINING THERAPY: CPT

## 2021-05-26 PROCEDURE — 74011250637 HC RX REV CODE- 250/637: Performed by: HOSPITALIST

## 2021-05-26 PROCEDURE — 99218 HC RM OBSERVATION: CPT

## 2021-05-26 PROCEDURE — 96372 THER/PROPH/DIAG INJ SC/IM: CPT

## 2021-05-26 PROCEDURE — 97530 THERAPEUTIC ACTIVITIES: CPT

## 2021-05-26 PROCEDURE — 80048 BASIC METABOLIC PNL TOTAL CA: CPT

## 2021-05-26 PROCEDURE — 74011250636 HC RX REV CODE- 250/636: Performed by: HOSPITALIST

## 2021-05-26 PROCEDURE — 96376 TX/PRO/DX INJ SAME DRUG ADON: CPT

## 2021-05-26 RX ORDER — SAME BUTANEDISULFONATE/BETAINE 400-600 MG
500 POWDER IN PACKET (EA) ORAL 2 TIMES DAILY
Qty: 28 CAPSULE | Refills: 0 | Status: SHIPPED | OUTPATIENT
Start: 2021-05-26 | End: 2021-06-02

## 2021-05-26 RX ORDER — LEVOFLOXACIN 500 MG/1
500 TABLET, FILM COATED ORAL DAILY
Qty: 10 TABLET | Refills: 0 | Status: SHIPPED | OUTPATIENT
Start: 2021-05-26 | End: 2021-06-02

## 2021-05-26 RX ORDER — AMLODIPINE BESYLATE 5 MG/1
10 TABLET ORAL DAILY
Qty: 30 TABLET | Refills: 0 | Status: SHIPPED | OUTPATIENT
Start: 2021-05-27

## 2021-05-26 RX ORDER — COLCHICINE 0.6 MG/1
0.6 TABLET ORAL DAILY
Qty: 30 TABLET | Refills: 0 | Status: SHIPPED | OUTPATIENT
Start: 2021-05-27

## 2021-05-26 RX ORDER — DOXYCYCLINE 100 MG/1
100 CAPSULE ORAL 2 TIMES DAILY
Qty: 14 CAPSULE | Refills: 0 | Status: SHIPPED | OUTPATIENT
Start: 2021-05-26 | End: 2021-06-02

## 2021-05-26 RX ORDER — HYDRALAZINE HYDROCHLORIDE 20 MG/ML
10 INJECTION INTRAMUSCULAR; INTRAVENOUS
Status: DISCONTINUED | OUTPATIENT
Start: 2021-05-26 | End: 2021-05-26 | Stop reason: HOSPADM

## 2021-05-26 RX ADMIN — ASPIRIN 81 MG: 81 TABLET, CHEWABLE ORAL at 11:01

## 2021-05-26 RX ADMIN — PANTOPRAZOLE SODIUM 40 MG: 40 TABLET, DELAYED RELEASE ORAL at 05:59

## 2021-05-26 RX ADMIN — OXYCODONE HYDROCHLORIDE AND ACETAMINOPHEN 1 TABLET: 5; 325 TABLET ORAL at 16:02

## 2021-05-26 RX ADMIN — COLCHICINE 0.6 MG: 0.6 TABLET, FILM COATED ORAL at 11:01

## 2021-05-26 RX ADMIN — OXYCODONE HYDROCHLORIDE AND ACETAMINOPHEN 1 TABLET: 5; 325 TABLET ORAL at 11:00

## 2021-05-26 RX ADMIN — HEPARIN SODIUM 5000 UNITS: 5000 INJECTION INTRAVENOUS; SUBCUTANEOUS at 05:58

## 2021-05-26 RX ADMIN — HEPARIN SODIUM 5000 UNITS: 5000 INJECTION INTRAVENOUS; SUBCUTANEOUS at 13:02

## 2021-05-26 RX ADMIN — Medication 500 MG: at 11:01

## 2021-05-26 RX ADMIN — AMLODIPINE BESYLATE 5 MG: 5 TABLET ORAL at 11:01

## 2021-05-26 RX ADMIN — WATER 1 G: 1 INJECTION INTRAMUSCULAR; INTRAVENOUS; SUBCUTANEOUS at 13:21

## 2021-05-26 RX ADMIN — OXYCODONE HYDROCHLORIDE AND ACETAMINOPHEN 1 TABLET: 5; 325 TABLET ORAL at 00:20

## 2021-05-26 NOTE — PROGRESS NOTES
DC Plan: home with [address] manager noted PT recommendation for [facility]; discussed with patient; [facility] list provided and patient has selected [facility] as first choice, At [address] if they cannot assist.  Daughter also visits per patient. Care Management Interventions  PCP Verified by CM:  Yes  Mode of Transport at Discharge: Self  Transition of Care Consult (CM Consult): Discharge Planning, [address]: No  Reason Outside [place]:  ([facility] offered and patient selected another agency)  Physical Therapy Consult: Yes  Current Support Network: Own Home, Lives Alone  Confirm Follow Up Transport: Family  The Plan for Transition of Care is Related to the Following Treatment Goals : chest pain  The Patient and/or Patient Representative was Provided with a Choice of Provider and Agrees with the Discharge Plan?: Yes  Name of the Patient Representative Who was Provided with a Choice of Provider and Agrees with the Discharge Plan: Shantel Rubalcava, patient  Discharge Location  Discharge Placement: Home with home health

## 2021-05-26 NOTE — DISCHARGE SUMMARY
Discharge Summary    Patient: Wilber Mccarthy MRN: [id number]  CSN: [id number]    YOB: 1935  Age: 80 y.o. Sex: female    DOA: 5/21/2021 LOS:  LOS: 3 days   Discharge Date:      Primary Care Provider: Tomi Simpson DO    Admission Diagnoses: Chest pain [R07.9]    Discharge Diagnoses:    Hospital Problems  Date Reviewed: 4/29/2015        Codes Class Noted POA    Constipation ICD-10-CM: K59.00  ICD-9-CM: 564.00  5/23/2021 Unknown        Bradycardia ICD-10-CM: R00.1  ICD-9-CM: 427.89  5/21/2021 Unknown        Lung nodule ICD-10-CM: R91.1  ICD-9-CM: 793.11  5/21/2021 Unknown        Dyspnea ICD-10-CM: R06.00  ICD-9-CM: 786.09  5/21/2021 Unknown        Cellulitis of left foot ICD-10-CM: L03.116  ICD-9-CM: 682.7  5/21/2021 Unknown        Morbid obesity ([address] ICD-10-CM: E66.01  ICD-9-CM: 278.01  6/10/2013 Yes        * (Principal) Chest pain ICD-10-CM: R07.9  ICD-9-CM: 786.50  6/9/2013 Unknown        HTN (hypertension) ICD-10-CM: I10  ICD-9-CM: 401.9  6/9/2013 Yes              Discharge Condition: stable     Discharge Medications:     Current Discharge Medication List      START taking these medications    Details   amLODIPine (NORVASC) 5 mg tablet Take 2 Tablets by mouth daily. Qty: 30 Tablet, Refills: 0  Start date: 5/27/2021      Saccharomyces boulardii (FLORASTOR) 250 mg capsule Take 2 Capsules by mouth two (2) times a day for 7 days. Qty: 28 Capsule, Refills: 0  Start date: 5/26/2021, End date: 6/2/2021      doxycycline (MONODOX) 100 mg capsule Take 1 Capsule by mouth two (2) times a day for 7 days. Qty: 14 Capsule, Refills: 0  Start date: 5/26/2021, End date: 6/2/2021         CONTINUE these medications which have CHANGED    Details   colchicine 0.6 mg tablet Take 1 Tablet by mouth daily. Qty: 30 Tablet, Refills: 0  Start date: 5/27/2021      levoFLOXacin (Levaquin) 500 mg tablet Take 1 Tablet by mouth daily for 7 days.   Qty: 10 Tablet, Refills: 0  Start date: 5/26/2021, End date: 6/2/2021         CONTINUE these medications which have NOT CHANGED    Details   gabapentin (NEURONTIN) 600 mg tablet Take 600 mg by mouth three (3) times daily. Indications: neuropathic pain      oxyCODONE-acetaminophen (Percocet) 5-325 mg per tablet Take 1 Tablet by mouth every four (4) hours as needed for Pain for up to 7 days. Max Daily Amount: 6 Tablets. Take 1 tablet every 4-6 hours as needed for pain control. If you were instructed to try over the counter ibuprofen or tylenol, only take the percocet for pain not controlled with the over the counter medication. Qty: 20 Tablet, Refills: 0    Associated Diagnoses: Foot swelling; Cellulitis of toe of left foot      folic acid/multivit-min/lutein (CENTRUM SILVER PO) Take  by mouth daily. Dexlansoprazole (DEXILANT) 60 mg CpDB Take 60 mg by mouth as needed. dipyridamole-aspirin (AGGRENOX) 200-25 mg per SR capsule Take 1 Cap by mouth daily. meclizine (ANTIVERT) 12.5 mg tablet Take 12.5 mg by mouth three (3) times daily as needed. calcium carbonate (TUMS) 200 mg calcium (500 mg) Chew Take 1 Tab by mouth as needed. STOP taking these medications       atenoloL (TENORMIN) 100 mg tablet Comments:   Reason for Stopping:         potassium chloride (KLOR-CON) 20 mEq packet Comments:   Reason for Stopping:         olmesartan-hydroCHLOROthiazide (BENICAR HCT) 20-12.5 mg per tablet Comments:   Reason for Stopping:               Procedures : stress test     Consults: Cardiology and Pulmonary/Critical Care      PHYSICAL EXAM   Visit Vitals  BP (!) 170/57   Pulse 89   Temp 98.3 °F (36.8 °C)   Resp 18   Ht 5' 3\" (1.6 m)   Wt 108 kg (238 lb)   SpO2 100%   BMI 42.16 kg/m²     General: Awake, cooperative, no acute distress    HEENT: NC, Atraumatic. PERRLA, EOMI. Anicteric sclerae. Lungs:  CTA Bilaterally. No Wheezing/Rhonchi/Rales. Heart:  Regular  rhythm,  No murmur, No Rubs, No Gallops  Abdomen: Soft, Non distended, Non tender.  +Bowel sounds, Extremities: No c/c/, left foot swelling   Psych:   Not anxious or agitated. Neurologic:  No acute neurological deficits. Admission HPI :   Tanisha Boudreaux is a 80 y.o. female with history of asthma, reflux disease, OA, stroke, morbid obesity came to ER due to chest pain since yesterday. She reported chest pain on and off with dyspnea since yesterday. Worsening while exertion chest pain located the right side of chest no radiation. She was found troponin mildly elevated. CTA chest no PE, but indicated 1 cm nodule suspected malignancy. EKG no ST segment changes. Cardiology was consulted. She also reported she had pain in his left foot for several days associated with swelling. She visited the ER 2 days ago x-ray indicated no OM. She reported her pain did not improving. Received COVID-19 2 shots vaccination. Denies any exposure to COVID-19. Currently she has no  chest pain.     Admission and treatment discussed with patient and family, all agree and indicated a verbal understanding     Hospital Course :   Since pt  was admitted, pt  was put on cardiac monitor and acs was ruled out. Cta negative for PE and stress test negative for ischemic change. Pt remained chest pain free. The chest pain was no not cardiogenic, possible due to uncontrolled reflux disease. Cardiologist was on board, cleared pt to be d/c. Pulmonologist was on board for lung nodule, recommend to f/u as out-pt. She also received abx for foot cellulitis. X-ray no fracture, mri of foot done no fracture except soft tissue swelling. She remained afebrile and foot swelling got improving, continue po abx and f/u with pcp. She received PT/OT and recommend home health. She lives by her self ,does not want to go to rehab. Atenolol was hold due to bradycardia to 40s. Bradycardia resolved. Discharge planning discussed with patient, pt agrees  with the plan and no questions and concerns at this point. Activity: Activity as tolerated    Diet: Cardiac Diet    Follow-up: PCP  Dr. Gabino Friedman     Disposition: home with home health     Minutes spent on discharge: 45 min       Labs: Results:       Chemistry Recent Labs     05/26/21  0059 05/25/21 0305 05/24/21 0220   * 87 99    143 143   K 3.7 3.8 4.1    109 110   CO2 24 25 24   BUN 12 13 16   CREA 0.86 0.89 0.90   CA 8.5 8.3* 8.3*   AGAP 8 9 9   BUCR 14 15 18      CBC w/Diff Recent Labs     05/25/21 0305 05/24/21 0220   WBC 6.9 8.5   RBC 3.44* 3.34*   HGB 10.3* 10.2*   HCT 32.2* 31.2*    293   GRANS 57 60   LYMPH 25 22   EOS 4 4      Cardiac Enzymes No results for input(s): CPK, CKND1, ZULEIKA in the last 72 hours. No lab exists for component: CKRMB, TROIP   Coagulation No results for input(s): PTP, INR, APTT, INREXT in the last 72 hours. Lipid Panel Lab Results   Component Value Date/Time    Cholesterol, total 122 05/22/2021 01:10 AM    HDL Cholesterol 46 05/22/2021 01:10 AM    LDL, calculated 58.6 05/22/2021 01:10 AM    VLDL, calculated 17.4 05/22/2021 01:10 AM    Triglyceride 87 05/22/2021 01:10 AM    CHOL/HDL Ratio 2.7 05/22/2021 01:10 AM      BNP No results for input(s): BNPP in the last 72 hours. Liver Enzymes No results for input(s): TP, ALB, TBIL, AP in the last 72 hours. No lab exists for component: SGOT, GPT, DBIL   Thyroid Studies Lab Results   Component Value Date/Time    TSH 1.61 05/21/2021 06:05 AM    TSH 1.63 05/21/2021 06:05 AM          @micro    Significant Diagnostic Studies: XR HIP LT W OR WO PELV 2-3 VWS    Result Date: 5/19/2021  MEDICAL RECORDS NUMBER: [id number] PROCEDURE: 2 views of the pelvis and left hip DATE: 5/19/2021 6:10 PM HISTORY: 80years old Female. left hip pain Comparison: 1/11/2019 FINDINGS: Again demonstrated are postoperative changes related to a left hip prosthesis. There is relatively good anatomic alignment of the hardware without significant interval change in alignment.  There is no osseous abnormality of the pelvis or bilateral proximal femurs. 1. No obvious complicating features of the left hip prosthesis. No acute osseous abnormality. This report has been generated using voice recognition software. XR FOOT LT MIN 3 V    Result Date: 5/21/2021  EXAM: LEFT FOOT RADIOGRAPHS CLINICAL INDICATION/HISTORY: foot pain and swelling -Additional: None COMPARISON: 5/19/2021 TECHNIQUE: 3 views of the left foot _______________ FINDINGS: BONES: No evidence of acute fracture or dislocation. Joint spaces and alignment are maintained. No aggressive appearing osseous lytic or blastic lesion. SOFT TISSUES: Soft tissue fullness about the forefoot. _______________     No evidence of acute fracture or dislocation. XR FOOT LT MIN 3 V    Result Date: 5/19/2021  MEDICAL RECORDS NUMBER: [id number] PROCEDURE: 3 views of the left foot. DATE: 5/19/2021 6:14 PM HISTORY: 80years old Female. pain and swelling Comparison: 7/6/2020 FINDINGS: There is no acute osseous abnormality involving the bones of the hindfoot midfoot or forefoot. There is degenerative osteophytosis noted of the calcaneus. The joint spaces are relatively preserved. Soft tissues are grossly unremarkable. 1. Degenerative calcaneal spurring without evidence of a significant acute osseous abnormality of the left foot. This report has been generated using voice recognition software. XR ABD (KUB)    Result Date: 5/23/2021  Abdomen, single view COMPARISON: None. INDICATION: Abdominal pain FINDINGS: Supine AP view the abdomen obtained on a single exposure. Nonobstructive and nonspecific bowel gas pattern is present. No gross free intraperitoneal gas given limitations of AP supine technique. Multilevel lumbar spondylosis. Partial inclusion left hip arthroplasty. .     Nonobstructive bowel gas pattern. No radiographic evidence of acute abnormality.      MRI FOOT LT WO CONT    Result Date: 5/24/2021  Examination: MRI left forefoot without contrast. HISTORY: 80year-old patient with foot pain, swelling. TECHNIQUE: MRI examination of the left forefoot was performed utilizing a local coil. Coronal and sagittal STIR and T1-weighted images as well as axial T1 and T2 fat-suppressed images are obtained without contrast. COMPARISON: Radiographs obtained May 21, 2021. FINDINGS: From image quality perspective, anatomic detail this study is mildly limited by the presence of motion artifact which is present across all provided sequences. Subchondral marrow edema noted across the third, fourth, and to lesser extent fifth tarsometatarsal articulations. Within the limitations of artifact, no definite evidence of fracture or stress fracture. Chondrosis involving the third through fifth tarsometatarsal joints noted along with mild first MTP joint chondrosis. Remaining MTP and IP joint spaces appear within normal limits. Diffuse intrinsic muscle atrophy noted with mild increased intramuscular edema signal. Moderate dorsal and lateral soft tissue swelling. No evidence of an organized or drainable fluid collection. 1. Dorsal pedal edema and skin thickening, potentially in keeping with the provided history of cellulitis. No T1 hypointense marrow replacing process to suggest osteomyelitis. 2. Degenerative changes across the midfoot and forefoot, greatest involving the third through fifth tarsometatarsal joints.   > Mild reactive marrow edema noted in these locations without evidence of fracture or stress fracture identified within the limitations of motion artifact. 3. Diffusely abnormal intrinsic muscle bulk and signal typical for changes related to subacute denervation. CTA CHEST W OR W WO CONT    Result Date: 5/21/2021  CT PA CHEST, PULMONARY ARTERIES History: chest pain and shortness of breath, suspected pulmonary embolism.  CT PA technique: Serial axial helical thin section high bolus rate contrast enhanced CT performed from above the diaphragm to just above the aortic arch with nonionic iodine containing IV contrast.  Coronal and sagittal reformations were also performed from axial data to optimize longitudinal assessment for embolus burden and confirm equivocal findings on the axial images. Additional 3-D slab MIP reconstructions were performed. Dose reduction technique: Automated exposure control, adjustment of the mAs and/or kVp according to the patient 's size, and iterative reconstruction techniques were used. Specifics for this study were placed by technologist staff into the patient's electronic medical record. Approximate dose, if determined, reference air kerma: Digital imaging and Communication in Medicine [DICOM] format image data are available to nonaffiliated external healthcare facilities or entities on a secure media free reciprocally searchable basis, with patient authorization, for at least a 12 month period after this study. If not available when requested, the health care system, which is responsible for storage archival and delivery, should be contacted directly. Macro Dose Quality very good No prior CT exams available. Comparison portable chest same date CT findings: Pulmonary Vasc. [place] Frater: There is no evident hypodense intraluminal thrombus, abrupt vascular cutoff, definite eccentric wall thickening down to the level to suggest acute pulmonary thromboembolism. Pulmonary trunk is normal caliber. Thoracic aorta is normal caliber. No evident mediastinal hematoma. Lungs: Lungs are mildly hypoinflated. . There is a spiculated bordered 1 cm soft tissue density pulmonary nodule in the junction of apical and anterior segments right upper lobe. There are a 0.4 cm peripheral pulmonary nodule in the upper posterior basal segment left lower lobe and 0.6 cm nodule more caudally in the same subsegment. Tiny 0.3 cm peripheral pulmonary nodule, perifissural lateral basal segment right lower lobe.  Other punctate fissural based nodules multifocally trim right lung probably small pleural lymph nodes. Minimal curvilinear partial atelectasis or scarring, superior lingula. Minimal dependent reticular partial atelectasis. No discrete consolidation, or nodular interstitial thickening. Central airways are patent. Pleura: No definite mass  or pleural effusion. Mild prominence of subpleural fat and paracardiac fat. Mediastinum: No pericardial effusion. Mild cardiomegaly, with multichamber including left heart enlargement. Few scattered normal sized mediastinal lymph nodes. No evident mediastinal or hilar lymphadenopathy. Misc: Large habitus. Multilevel spondylosis, confluent anterior longitudinal ligament ossification. No aggressive/destructive bony lesions are evident acute fracture Upper abdomen: Cholecystectomy clips, interlobar fissure. Small hiatal hernia. Prominent perihilar fat     1. No CTPA evidence of acute pulmonary thromboembolism. Mild hypoinflation and dependent partial atelectasis. 2.  No prior chest CT. Spiculated 1 cm pulmonary nodule, junction of apical and anterior segment right upper lobe, most likely small lung cancer. Other small and indeterminate pulmonary nodules and probable fissural-based pleural lymph nodes. 3. Cardiomegaly. Large habitus. Spondylosis. Cholecystectomy    XR CHEST PORT    Result Date: 5/21/2021  EXAM: XR CHEST PORT CLINICAL INDICATION/HISTORY: SOB -Additional: None COMPARISON: 7/6/2020 TECHNIQUE: Portable frontal view of the chest _______________ FINDINGS: SUPPORT DEVICES: None. HEART AND MEDIASTINUM: Cardiomediastinal silhouette within normal limits. LUNGS AND PLEURAL SPACES: No dense consolidation, large effusion or pneumothorax. _______________     No acute cardiopulmonary abnormality. ECHO ADULT COMPLETE    Result Date: 5/24/2021  · LV: Estimated LVEF is 55 - 60%. Normal cavity size and systolic function (ejection fraction normal). Moderate concentric hypertrophy.  Mild (grade 1) left ventricular diastolic dysfunction E/E' ratio is 13.46. · PA: Mild pulmonary hypertension. Pulmonary arterial systolic pressure is 33 mmHg. · LA: Mildly dilated left atrium. · RV: Mildly dilated right ventricle. · MV: Mitral valve non-specific thickening. Mild mitral annular calcification. · RA: Mildly dilated right atrium. NUCLEAR CARDIAC STRESS TEST    Result Date: 5/24/2021  · Baseline ECG: Normal EKG. · Stress test: Negative stress test. Exercise stress test: EKG stress test results correlate with a low risk of inducible myocardial ischemia. · SPECT: Left ventricular function post-stress was normal. Calculated ejection fraction is 69%. There is no evidence of transient ischemic dilation (TID). The TID ratio is 1.02. · SPECT: Left ventricular perfusion is normal. Myocardial perfusion imaging supports a low risk stress test.      DUPLEX LOWER EXT VENOUS LEFT    Result Date: 5/20/2021  · No evidence of deep vein thrombosis in the left lower extremity. [facility]     CC:  Gregory Noel DO

## 2021-05-26 NOTE — PROGRESS NOTES
Problem: Mobility Impaired (Adult and Pediatric)  Goal: *Acute Goals and Plan of Care (Insert Text)  Description: Physical Therapy short term goals initiated 05/25/21, to be achieved in 3-7 days. Pt will:   1. Perform bed mobility with indep in prep for OOB activity. 2. Perform sit <> stand transfers with RW and S in prep for functional mobility and ambulation. 3. Ambulate 100 ft with RW and S in prep for household and community mobility. 4. Transfer bed <> chair with RW and S in prep for OOB activity and ADL completion. Outcome: Progressing Towards Goal    physical Therapy TREATMENT    Patient: Dunia Lara (74 y.o. female)  Date: 5/26/2021  Diagnosis: Chest pain [R07.9] Chest pain  Precautions: Fall   Chart, physical therapy assessment, plan of care and goals were reviewed. ASSESSMENT:  Pt found supine in bed upon PT arrival. Pain 6/10 L foot due to episode of gout. L foot edematous; red along first MTJ. Completed transition to supine mod I and sock donned by PT. Pt transfers with RW/SB/CGA with WBAT R foot. Gait distance limited to 10ft with CGA, step to gt with antalgic gt pattern. Returned to sit EOB and verbally instructed in step nego as pt has small step threshold entry. Able to recite same accurately. states daughter will take pt home and will be staying with pt for a few days. Left seated EOB with all needs in reach. Nurse Yen Meyers notified of pt request for pain meds. Pt will benefit from continued PT to improve functional mobility including gait quality, safety and independence (as tolerated due to gout pain). Recommend HHPT. Progression toward goals:  []      Improving appropriately and progressing toward goals  [x]      Improving slowly and progressing toward goals  []      Not making progress toward goals and plan of care will be adjusted     PLAN:  Patient continues to benefit from skilled intervention to address the above impairments.   Continue treatment per established plan of care. Discharge Recommendations:  Home Health  Further Equipment Recommendations for Discharge:  N/A     SUBJECTIVE:   Patient stated My foot swelled up after walking yesterday, so I'm not for that today.     OBJECTIVE DATA SUMMARY:   Critical Behavior:  Neurologic State: Alert, Appropriate for age  Orientation Level: Oriented X4  Cognition: Appropriate decision making  Safety/Judgement: Fall prevention, Home safety, Insight into deficits  Functional Mobility Training:  Bed Mobility:  Supine to Sit: Modified independent  Scooting: Modified independent  Transfers:  Sit to Stand: Stand-by assistance;Contact guard assistance  Stand to Sit: Stand-by assistance;Contact guard assistance (vc)  Balance:  Sitting: Intact  Standing: Intact; With support  Ambulation/Gait Training:  Distance (ft): 10 Feet (ft)  Assistive Device: Gait belt;Walker, rolling  Ambulation - Level of Assistance: Contact guard assistance  Gait Abnormalities: Antalgic;Decreased step clearance; Step to gait  Stance: Left decreased  Speed/Rosamaria: Slow  Step Length: Left shortened;Right shortened  Interventions: Safety awareness training;Verbal cues  Pain:  Pain Scale 1: Numeric (0 - 10)  Pain Intensity 1: 6  Pain Location 1: Foot  Pain Orientation 1: Left  Pain Description 1: Sore; Throbbing  Pain Intervention(s) 1: Declines  Activity Tolerance:   Fair   Please refer to the flowsheet for vital signs taken during this treatment.   After treatment:   [x] Patient left in no apparent distress sitting up EOB  [] Patient left in no apparent distress in bed  [x] Call bell left within reach  [x] Nursing notified  [] Caregiver present  [] Bed alarm activated      Bernadette Patel PT   Time Calculation: 23 mins

## 2021-05-26 NOTE — DISCHARGE INSTRUCTIONS
Patient Education   Patient Education   Patient Education        Purine-Restricted Diet: Care Instructions  Your Care Instructions     Purines are substances that are found in some foods. Your body turns purines into uric acid. High levels of uric acid can cause gout, which is a form of arthritis that causes pain and inflammation in joints. You may be able to help control the amount of uric acid in your body by limiting high-purine foods in your diet. Follow-up care is a key part of your treatment and safety. Be sure to make and go to all appointments, and call your doctor if you are having problems. It's also a good idea to know your test results and keep a list of the medicines you take. How can you care for yourself at home? · Plan your meals and snacks around foods that are low in purines and are safe for you to eat. These foods include:  ? Green vegetables and tomatoes. ? Fruits. ? Whole-grain breads, rice, and cereals. ? Eggs, peanut butter, and nuts. ? Low-fat milk, cheese, and other milk products. ? Popcorn. ? Gelatin desserts, chocolate, cocoa, and cakes and sweets, in small amounts. · You can eat certain foods that are medium-high in purines, but eat them only once in a while. These foods include:  ? Legumes, such as dried beans and dried peas. You can have 1 cup cooked legumes each day. ? Asparagus, cauliflower, spinach, mushrooms, and green peas. ? Fish and seafood (other than very high-purine seafood). ? Oatmeal, wheat bran, and wheat germ. · Limit very high-purine foods, including:  ? Organ meats, such as liver, kidneys, sweetbreads, and brains. ? Meats, including oreilly, beef, pork, and lamb. ? Game meats and any other meats in large amounts. ? Anchovies, sardines, herring, mackerel, and scallops. ? Gravy. ? Beer. Where can you learn more?   Go to http://www.gray.com/  Enter F448 in the search box to learn more about \"Purine-Restricted Diet: Care Instructions. \"  Current as of: December 17, 2020               Content Version: 12.8  © 0537-3443 [employer]. Care instructions adapted under license by [employer] (which disclaims liability or warranty for this information). If you have questions about a medical condition or this instruction, always ask your healthcare professional. [address] any warranty or liability for your use of this information. Gout: Care Instructions  Overview     Gout is a form of arthritis caused by a buildup of uric acid crystals in a joint. It causes sudden attacks of pain, swelling, redness, and stiffness, usually in one joint, especially the big toe. Gout usually comes on without a cause. But it can be brought on by drinking alcohol (especially beer), eating or drinking things made with high-fructose corn syrup, or eating seafood or red meat. Taking certain medicines, such as diuretics, can also trigger an attack of gout. Taking your medicines as prescribed and following up with your doctor regularly can help you avoid gout attacks in the future. Follow-up care is a key part of your treatment and safety. Be sure to make and go to all appointments, and call your doctor if you are having problems. It's also a good idea to know your test results and keep a list of the medicines you take. How can you care for yourself at home? · If the joint is swollen, put ice or a cold pack on the area for 10 to 20 minutes at a time. Put a thin cloth between the ice and your skin. · Prop up the sore limb on a pillow when you ice it or anytime you sit or lie down during the next 3 days. Try to keep it above the level of your heart. This can help reduce swelling. · Rest sore joints. Avoid activities that put weight or strain on the joints for a few days. Take short rest breaks from your regular activities during the day. · Take your medicines exactly as prescribed.  Call your doctor if you think you are having a problem with your medicine. · Take pain medicines exactly as directed. ? If the doctor gave you a prescription medicine for pain, take it as prescribed. ? If you are not taking a prescription pain medicine, ask your doctor if you can take an over-the-counter medicine. · Eat less seafood and red meat. · Avoid foods or drinks that are made with high-fructose corn syrup. · Check with your doctor before drinking alcohol. · Losing weight, if you are overweight, may help reduce attacks of gout. But do not go on a diet that causes rapid weight loss. Losing a lot of weight in a short amount of time can cause a gout attack. When should you call for help? Call your doctor now or seek immediate medical care if:    · You have a fever.     · The joint is so painful you cannot use it.     · You have sudden, unexplained swelling, redness, warmth, or severe pain in one or more joints. Watch closely for changes in your health, and be sure to contact your doctor if:    · You have joint pain.     · Your symptoms get worse or are not improving after 2 or 3 days. Where can you learn more? Go to http://www.gray.com/  Enter E531 in the search box to learn more about \"Gout: Care Instructions. \"  Current as of: August 5, 2020               Content Version: 12.8  © 2006-2021 [employer]. Care instructions adapted under license by [employer] (which disclaims liability or warranty for this information). If you have questions about a medical condition or this instruction, always ask your healthcare professional. [place] [address] any warranty or liability for your use of this information. Cellulitis: Care Instructions  Your Care Instructions     Cellulitis is a skin infection caused by bacteria, most often strep or staph.  It often occurs after a break in the skin from a scrape, cut, bite, or puncture, or after a rash.  Cellulitis may be treated without doing tests to find out what caused it. But your doctor may do tests, if needed, to look for a specific bacteria, like methicillin-resistant Staphylococcus aureus (MRSA). The doctor has checked you carefully, but problems can develop later. If you notice any problems or new symptoms, get medical treatment right away. Follow-up care is a key part of your treatment and safety. Be sure to make and go to all appointments, and call your doctor if you are having problems. It's also a good idea to know your test results and keep a list of the medicines you take. How can you care for yourself at home? · Take your antibiotics as directed. Do not stop taking them just because you feel better. You need to take the full course of antibiotics. · Prop up the infected area on pillows to reduce pain and swelling. Try to keep the area above the level of your heart as often as you can. · If your doctor told you how to care for your wound, follow your doctor's instructions. If you did not get instructions, follow this general advice:  ? Wash the wound with clean water 2 times a day. Don't use hydrogen peroxide or alcohol, which can slow healing. ? You may cover the wound with a thin layer of petroleum jelly, such as Vaseline, and a nonstick bandage. ? Apply more petroleum jelly and replace the bandage as needed. · Be safe with medicines. Take pain medicines exactly as directed. ? If the doctor gave you a prescription medicine for pain, take it as prescribed. ? If you are not taking a prescription pain medicine, ask your doctor if you can take an over-the-counter medicine. To prevent cellulitis in the future  · Try to prevent cuts, scrapes, or other injuries to your skin. Cellulitis most often occurs where there is a break in the skin. · If you get a scrape, cut, mild burn, or bite, wash the wound with clean water as soon as you can to help avoid infection.  Don't use hydrogen peroxide or alcohol, which can slow healing. · If you have swelling in your legs (edema), support stockings and good skin care may help prevent leg sores and cellulitis. · Take care of your feet, especially if you have diabetes or other conditions that increase the risk of infection. Wear shoes and socks. Do not go barefoot. If you have athlete's foot or other skin problems on your feet, talk to your doctor about how to treat them. When should you call for help? Call your doctor now or seek immediate medical care if:    · You have signs that your infection is getting worse, such as:  ? Increased pain, swelling, warmth, or redness. ? Red streaks leading from the area. ? Pus draining from the area. ? A fever.     · You get a rash. Watch closely for changes in your health, and be sure to contact your doctor if:    · You do not get better as expected. Where can you learn more? Go to http://www.gray.com/  Enter X309 in the search box to learn more about \"Cellulitis: Care Instructions. \"  Current as of: July 2, 2020               Content Version: 12.8  © 8658-6077 [employer]. Care instructions adapted under license by [employer] (which disclaims liability or warranty for this information). If you have questions about a medical condition or this instruction, always ask your healthcare professional. [address] any warranty or liability for your use of this information.

## 2021-05-26 NOTE — PROGRESS NOTES
0730- Bedside report received from [address] Patient in bed at this time. Pain 0/10. Plan of care for the day addressed with the patient. 1050 - Patient in bed at this time. A/O x 4. IV to upper right arm  intact and patent. Patient denies numbness/tingling. Pedal pulses palpable. Lungs clear, diminished. Bowel sounds active to all quadrants. Pain 6/10. 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 1100 - Pain 6/10. PRN 1 tablet 5-325 percocet pain medication administered at this time. Patient has been educated on side effects. 1602- Pain 8/10. PRN 1 tablet 5-325 percocet pain medication administered at this time. Patient has been educated on side effects. 1616-Discharge instructions reviewed with patient at this time. Opportunity for questions and clarification was provided. Patient has verbalized understanding. Patient was provided with care notes to include side effects of RX's. Arm bands removed and shredded. AVS reviewed with Mckenna Mayes RN. IV removed.

## 2021-05-28 ENCOUNTER — HOME CARE VISIT (OUTPATIENT)
Dept: SCHEDULING | Facility: HOME HEALTH | Age: 86
End: 2021-05-28
Payer: MEDICARE

## 2021-05-28 ENCOUNTER — HOME CARE VISIT (OUTPATIENT)
Dept: HOME HEALTH SERVICES | Facility: HOME HEALTH | Age: 86
End: 2021-05-28

## 2021-05-28 PROCEDURE — 3331090001 HH PPS REVENUE CREDIT

## 2021-05-28 PROCEDURE — 3331090002 HH PPS REVENUE DEBIT

## 2021-05-28 PROCEDURE — 400013 HH SOC

## 2021-05-28 PROCEDURE — 400018 HH-NO PAY CLAIM PROCEDURE

## 2021-05-28 PROCEDURE — G0299 HHS/HOSPICE OF RN EA 15 MIN: HCPCS

## 2021-05-29 VITALS
RESPIRATION RATE: 16 BRPM | OXYGEN SATURATION: 95 % | TEMPERATURE: 97.3 F | HEART RATE: 61 BPM | DIASTOLIC BLOOD PRESSURE: 51 MMHG | SYSTOLIC BLOOD PRESSURE: 138 MMHG

## 2021-05-29 PROCEDURE — 3331090001 HH PPS REVENUE CREDIT

## 2021-05-29 PROCEDURE — 3331090002 HH PPS REVENUE DEBIT

## 2021-05-30 PROCEDURE — 3331090002 HH PPS REVENUE DEBIT

## 2021-05-30 PROCEDURE — 3331090001 HH PPS REVENUE CREDIT

## 2021-05-31 ENCOUNTER — HOME CARE VISIT (OUTPATIENT)
Dept: HOME HEALTH SERVICES | Facility: HOME HEALTH | Age: 86
End: 2021-05-31
Payer: MEDICARE

## 2021-05-31 PROCEDURE — 3331090001 HH PPS REVENUE CREDIT

## 2021-05-31 PROCEDURE — 3331090002 HH PPS REVENUE DEBIT

## 2021-06-01 ENCOUNTER — HOME CARE VISIT (OUTPATIENT)
Dept: SCHEDULING | Facility: HOME HEALTH | Age: 86
End: 2021-06-01
Payer: MEDICARE

## 2021-06-01 PROCEDURE — 3331090001 HH PPS REVENUE CREDIT

## 2021-06-01 PROCEDURE — 3331090002 HH PPS REVENUE DEBIT

## 2021-06-02 PROCEDURE — 3331090002 HH PPS REVENUE DEBIT

## 2021-06-02 PROCEDURE — 3331090001 HH PPS REVENUE CREDIT

## 2021-06-03 PROCEDURE — 3331090002 HH PPS REVENUE DEBIT

## 2021-06-03 PROCEDURE — 3331090001 HH PPS REVENUE CREDIT

## 2021-06-04 ENCOUNTER — HOME CARE VISIT (OUTPATIENT)
Dept: SCHEDULING | Facility: HOME HEALTH | Age: 86
End: 2021-06-04
Payer: MEDICARE

## 2021-06-04 PROCEDURE — 3331090002 HH PPS REVENUE DEBIT

## 2021-06-04 PROCEDURE — 3331090001 HH PPS REVENUE CREDIT

## 2021-06-04 PROCEDURE — G0495 RN CARE TRAIN/EDU IN HH: HCPCS

## 2021-06-05 VITALS
OXYGEN SATURATION: 98 % | RESPIRATION RATE: 16 BRPM | TEMPERATURE: 96.1 F | HEART RATE: 63 BPM | SYSTOLIC BLOOD PRESSURE: 129 MMHG | DIASTOLIC BLOOD PRESSURE: 55 MMHG

## 2021-06-05 PROCEDURE — 3331090001 HH PPS REVENUE CREDIT

## 2021-06-05 PROCEDURE — 3331090002 HH PPS REVENUE DEBIT

## 2021-06-06 PROCEDURE — 3331090002 HH PPS REVENUE DEBIT

## 2021-06-06 PROCEDURE — 3331090001 HH PPS REVENUE CREDIT

## 2021-06-07 ENCOUNTER — HOME CARE VISIT (OUTPATIENT)
Dept: HOME HEALTH SERVICES | Facility: HOME HEALTH | Age: 86
End: 2021-06-07
Payer: MEDICARE

## 2021-06-07 PROCEDURE — 3331090001 HH PPS REVENUE CREDIT

## 2021-06-07 PROCEDURE — 3331090002 HH PPS REVENUE DEBIT

## 2021-06-08 ENCOUNTER — HOME CARE VISIT (OUTPATIENT)
Dept: SCHEDULING | Facility: HOME HEALTH | Age: 86
End: 2021-06-08
Payer: MEDICARE

## 2021-06-08 ENCOUNTER — HOME CARE VISIT (OUTPATIENT)
Dept: HOME HEALTH SERVICES | Facility: HOME HEALTH | Age: 86
End: 2021-06-08
Payer: MEDICARE

## 2021-06-08 PROCEDURE — 3331090002 HH PPS REVENUE DEBIT

## 2021-06-08 PROCEDURE — 3331090001 HH PPS REVENUE CREDIT

## 2021-06-08 PROCEDURE — G0155 HHCP-SVS OF CSW,EA 15 MIN: HCPCS

## 2021-06-09 ENCOUNTER — HOME CARE VISIT (OUTPATIENT)
Dept: SCHEDULING | Facility: HOME HEALTH | Age: 86
End: 2021-06-09
Payer: MEDICARE

## 2021-06-09 PROCEDURE — G0152 HHCP-SERV OF OT,EA 15 MIN: HCPCS

## 2021-06-09 PROCEDURE — 3331090001 HH PPS REVENUE CREDIT

## 2021-06-09 PROCEDURE — 3331090002 HH PPS REVENUE DEBIT

## 2021-06-10 VITALS
HEART RATE: 74 BPM | DIASTOLIC BLOOD PRESSURE: 84 MMHG | TEMPERATURE: 98.4 F | OXYGEN SATURATION: 97 % | SYSTOLIC BLOOD PRESSURE: 110 MMHG

## 2021-06-10 PROCEDURE — 3331090002 HH PPS REVENUE DEBIT

## 2021-06-10 PROCEDURE — 3331090001 HH PPS REVENUE CREDIT

## 2021-06-11 ENCOUNTER — HOME CARE VISIT (OUTPATIENT)
Dept: SCHEDULING | Facility: HOME HEALTH | Age: 86
End: 2021-06-11
Payer: MEDICARE

## 2021-06-11 VITALS
TEMPERATURE: 96.7 F | SYSTOLIC BLOOD PRESSURE: 134 MMHG | RESPIRATION RATE: 16 BRPM | OXYGEN SATURATION: 98 % | DIASTOLIC BLOOD PRESSURE: 88 MMHG | HEART RATE: 79 BPM

## 2021-06-11 PROCEDURE — G0299 HHS/HOSPICE OF RN EA 15 MIN: HCPCS

## 2021-06-11 PROCEDURE — 3331090002 HH PPS REVENUE DEBIT

## 2021-06-11 PROCEDURE — 3331090001 HH PPS REVENUE CREDIT

## 2021-06-12 PROCEDURE — 3331090001 HH PPS REVENUE CREDIT

## 2021-06-12 PROCEDURE — 3331090002 HH PPS REVENUE DEBIT

## 2021-06-16 ENCOUNTER — TRANSCRIBE ORDER (OUTPATIENT)
Dept: SCHEDULING | Age: 86
End: 2021-06-16

## 2021-06-17 ENCOUNTER — TRANSCRIBE ORDER (OUTPATIENT)
Dept: SCHEDULING | Age: 86
End: 2021-06-17

## 2021-06-17 DIAGNOSIS — R91.1 LUNG NODULE: Primary | ICD-10-CM

## 2021-06-22 ENCOUNTER — HOSPITAL ENCOUNTER (OUTPATIENT)
Dept: PET IMAGING | Age: 86
Discharge: HOME | End: 2021-06-22
Attending: INTERNAL MEDICINE
Payer: MEDICARE

## 2021-06-22 DIAGNOSIS — R91.1 LUNG NODULE: ICD-10-CM

## 2021-06-22 PROCEDURE — A9552 F18 FDG: HCPCS

## 2021-06-22 RX ORDER — FLUDEOXYGLUCOSE F-18 200 MCI/ML
10.11 INJECTION INTRAVENOUS ONCE
Status: COMPLETED | OUTPATIENT
Start: 2021-06-22 | End: 2021-06-22

## 2021-06-22 RX ADMIN — FLUDEOXYGLUCOSE F-18 10.11 MILLICURIE: 200 INJECTION INTRAVENOUS at 13:13

## 2021-07-28 ENCOUNTER — TRANSCRIBE ORDER (OUTPATIENT)
Dept: SCHEDULING | Age: 86
End: 2021-07-28

## 2021-07-28 DIAGNOSIS — R91.1 PULMONARY NODULE: Primary | ICD-10-CM

## 2021-08-06 ENCOUNTER — APPOINTMENT (OUTPATIENT)
Dept: CT IMAGING | Age: 86
End: 2021-08-06
Attending: EMERGENCY MEDICINE
Payer: MEDICARE

## 2021-08-06 ENCOUNTER — APPOINTMENT (OUTPATIENT)
Dept: GENERAL RADIOLOGY | Age: 86
End: 2021-08-06
Attending: EMERGENCY MEDICINE
Payer: MEDICARE

## 2021-08-06 ENCOUNTER — HOSPITAL ENCOUNTER (EMERGENCY)
Age: 86
Discharge: HOME | End: 2021-08-06
Attending: EMERGENCY MEDICINE
Payer: MEDICARE

## 2021-08-06 ENCOUNTER — APPOINTMENT (OUTPATIENT)
Dept: VASCULAR SURGERY | Age: 86
End: 2021-08-06
Attending: EMERGENCY MEDICINE
Payer: MEDICARE

## 2021-08-06 VITALS
OXYGEN SATURATION: 97 % | WEIGHT: 232 LBS | HEART RATE: 59 BPM | RESPIRATION RATE: 16 BRPM | SYSTOLIC BLOOD PRESSURE: 132 MMHG | DIASTOLIC BLOOD PRESSURE: 64 MMHG | TEMPERATURE: 98.2 F | BODY MASS INDEX: 39.61 KG/M2 | HEIGHT: 64 IN

## 2021-08-06 DIAGNOSIS — L03.90 RECURRENT CELLULITIS: ICD-10-CM

## 2021-08-06 DIAGNOSIS — M79.672 FOOT PAIN, LEFT: ICD-10-CM

## 2021-08-06 DIAGNOSIS — R06.09 DYSPNEA ON EXERTION: ICD-10-CM

## 2021-08-06 DIAGNOSIS — L03.116 CELLULITIS OF LEFT FOOT: Primary | ICD-10-CM

## 2021-08-06 LAB
ALBUMIN SERPL-MCNC: 3.5 G/DL (ref 3.4–5)
ALBUMIN/GLOB SERPL: 0.9 {RATIO} (ref 0.8–1.7)
ALP SERPL-CCNC: 116 U/L (ref 45–117)
ALT SERPL-CCNC: 82 U/L (ref 13–56)
ANION GAP SERPL CALC-SCNC: 5 MMOL/L (ref 3–18)
AST SERPL-CCNC: 108 U/L (ref 10–38)
ATRIAL RATE: 76 BPM
BASOPHILS # BLD: 0.1 K/UL (ref 0–0.1)
BASOPHILS NFR BLD: 0 % (ref 0–2)
BILIRUB SERPL-MCNC: 1.1 MG/DL (ref 0.2–1)
BUN SERPL-MCNC: 13 MG/DL (ref 7–18)
BUN/CREAT SERPL: 13 (ref 12–20)
CALCIUM SERPL-MCNC: 9.4 MG/DL (ref 8.5–10.1)
CALCULATED P AXIS, ECG09: 49 DEGREES
CALCULATED R AXIS, ECG10: -20 DEGREES
CALCULATED T AXIS, ECG11: 34 DEGREES
CHLORIDE SERPL-SCNC: 104 MMOL/L (ref 100–111)
CK MB CFR SERPL CALC: NORMAL % (ref 0–4)
CK MB SERPL-MCNC: <1 NG/ML (ref 5–25)
CK SERPL-CCNC: 91 U/L (ref 26–192)
CO2 SERPL-SCNC: 28 MMOL/L (ref 21–32)
CREAT SERPL-MCNC: 1.03 MG/DL (ref 0.6–1.3)
D DIMER PPP FEU-MCNC: 1.4 UG/ML(FEU)
DIAGNOSIS, 93000: NORMAL
DIFFERENTIAL METHOD BLD: ABNORMAL
EOSINOPHIL # BLD: 0.1 K/UL (ref 0–0.4)
EOSINOPHIL NFR BLD: 0 % (ref 0–5)
ERYTHROCYTE [DISTWIDTH] IN BLOOD BY AUTOMATED COUNT: 14.2 % (ref 11.6–14.5)
GLOBULIN SER CALC-MCNC: 4 G/DL (ref 2–4)
GLUCOSE SERPL-MCNC: 123 MG/DL (ref 74–99)
HCT VFR BLD AUTO: 36.6 % (ref 35–45)
HGB BLD-MCNC: 12.7 G/DL (ref 12–16)
LYMPHOCYTES # BLD: 1.8 K/UL (ref 0.9–3.6)
LYMPHOCYTES NFR BLD: 11 % (ref 21–52)
MCH RBC QN AUTO: 31.3 PG (ref 24–34)
MCHC RBC AUTO-ENTMCNC: 34.7 G/DL (ref 31–37)
MCV RBC AUTO: 90.1 FL (ref 74–97)
MONOCYTES # BLD: 1.4 K/UL (ref 0.05–1.2)
MONOCYTES NFR BLD: 9 % (ref 3–10)
NEUTS SEG # BLD: 12.3 K/UL (ref 1.8–8)
NEUTS SEG NFR BLD: 79 % (ref 40–73)
P-R INTERVAL, ECG05: 178 MS
PLATELET # BLD AUTO: 321 K/UL (ref 135–420)
PMV BLD AUTO: 9.6 FL (ref 9.2–11.8)
POTASSIUM SERPL-SCNC: 3.9 MMOL/L (ref 3.5–5.5)
PROT SERPL-MCNC: 7.5 G/DL (ref 6.4–8.2)
Q-T INTERVAL, ECG07: 376 MS
QRS DURATION, ECG06: 92 MS
QTC CALCULATION (BEZET), ECG08: 423 MS
RBC # BLD AUTO: 4.06 M/UL (ref 4.2–5.3)
SODIUM SERPL-SCNC: 137 MMOL/L (ref 136–145)
TROPONIN I SERPL-MCNC: <0.02 NG/ML (ref 0–0.04)
URATE SERPL-MCNC: 6.3 MG/DL (ref 2.6–7.2)
VENTRICULAR RATE, ECG03: 76 BPM
WBC # BLD AUTO: 15.7 K/UL (ref 4.6–13.2)

## 2021-08-06 PROCEDURE — 96374 THER/PROPH/DIAG INJ IV PUSH: CPT

## 2021-08-06 PROCEDURE — 71275 CT ANGIOGRAPHY CHEST: CPT

## 2021-08-06 PROCEDURE — 93005 ELECTROCARDIOGRAM TRACING: CPT

## 2021-08-06 PROCEDURE — 87040 BLOOD CULTURE FOR BACTERIA: CPT

## 2021-08-06 PROCEDURE — 74011250636 HC RX REV CODE- 250/636: Performed by: EMERGENCY MEDICINE

## 2021-08-06 PROCEDURE — 74011250637 HC RX REV CODE- 250/637: Performed by: EMERGENCY MEDICINE

## 2021-08-06 PROCEDURE — 74011000250 HC RX REV CODE- 250: Performed by: EMERGENCY MEDICINE

## 2021-08-06 PROCEDURE — 82553 CREATINE MB FRACTION: CPT

## 2021-08-06 PROCEDURE — 93971 EXTREMITY STUDY: CPT

## 2021-08-06 PROCEDURE — 85025 COMPLETE CBC W/AUTO DIFF WBC: CPT

## 2021-08-06 PROCEDURE — 80053 COMPREHEN METABOLIC PANEL: CPT

## 2021-08-06 PROCEDURE — 85379 FIBRIN DEGRADATION QUANT: CPT

## 2021-08-06 PROCEDURE — 84550 ASSAY OF BLOOD/URIC ACID: CPT

## 2021-08-06 PROCEDURE — 99284 EMERGENCY DEPT VISIT MOD MDM: CPT

## 2021-08-06 PROCEDURE — 74011636637 HC RX REV CODE- 636/637: Performed by: EMERGENCY MEDICINE

## 2021-08-06 PROCEDURE — 71045 X-RAY EXAM CHEST 1 VIEW: CPT

## 2021-08-06 PROCEDURE — 74011000636 HC RX REV CODE- 636: Performed by: EMERGENCY MEDICINE

## 2021-08-06 RX ORDER — FLUCONAZOLE 100 MG/1
100 TABLET ORAL DAILY
Qty: 2 TABLET | Refills: 0 | Status: SHIPPED | OUTPATIENT
Start: 2021-08-10 | End: 2021-08-12

## 2021-08-06 RX ORDER — FLUCONAZOLE 100 MG/1
100 TABLET ORAL DAILY
Qty: 2 TABLET | Refills: 0 | Status: SHIPPED | OUTPATIENT
Start: 2021-08-10 | End: 2021-08-06 | Stop reason: SDUPTHER

## 2021-08-06 RX ORDER — HYDROCODONE BITARTRATE AND ACETAMINOPHEN 5; 325 MG/1; MG/1
1 TABLET ORAL ONCE
Status: COMPLETED | OUTPATIENT
Start: 2021-08-06 | End: 2021-08-06

## 2021-08-06 RX ORDER — HYDROCODONE BITARTRATE AND ACETAMINOPHEN 5; 325 MG/1; MG/1
1 TABLET ORAL
Qty: 10 TABLET | Refills: 0 | Status: SHIPPED | OUTPATIENT
Start: 2021-08-06 | End: 2021-08-06 | Stop reason: SDUPTHER

## 2021-08-06 RX ORDER — PREDNISONE 20 MG/1
60 TABLET ORAL
Status: COMPLETED | OUTPATIENT
Start: 2021-08-06 | End: 2021-08-06

## 2021-08-06 RX ORDER — CEPHALEXIN 500 MG/1
500 CAPSULE ORAL 3 TIMES DAILY
Qty: 21 CAPSULE | Refills: 0 | Status: SHIPPED | OUTPATIENT
Start: 2021-08-06 | End: 2021-08-06 | Stop reason: SDUPTHER

## 2021-08-06 RX ORDER — HYDROCODONE BITARTRATE AND ACETAMINOPHEN 5; 325 MG/1; MG/1
1 TABLET ORAL
Qty: 10 TABLET | Refills: 0 | Status: SHIPPED | OUTPATIENT
Start: 2021-08-06 | End: 2021-08-07 | Stop reason: SDUPTHER

## 2021-08-06 RX ORDER — SULFAMETHOXAZOLE AND TRIMETHOPRIM 800; 160 MG/1; MG/1
1 TABLET ORAL 2 TIMES DAILY
Qty: 10 TABLET | Refills: 0 | Status: SHIPPED | OUTPATIENT
Start: 2021-08-06 | End: 2021-08-11

## 2021-08-06 RX ORDER — SULFAMETHOXAZOLE AND TRIMETHOPRIM 800; 160 MG/1; MG/1
1 TABLET ORAL 2 TIMES DAILY
Qty: 10 TABLET | Refills: 0 | Status: SHIPPED | OUTPATIENT
Start: 2021-08-06 | End: 2021-08-06 | Stop reason: SDUPTHER

## 2021-08-06 RX ORDER — CEPHALEXIN 500 MG/1
500 CAPSULE ORAL 3 TIMES DAILY
Qty: 21 CAPSULE | Refills: 0 | Status: SHIPPED | OUTPATIENT
Start: 2021-08-06 | End: 2021-08-13

## 2021-08-06 RX ADMIN — PREDNISONE 60 MG: 20 TABLET ORAL at 14:54

## 2021-08-06 RX ADMIN — IOPAMIDOL 100 ML: 755 INJECTION, SOLUTION INTRAVENOUS at 18:17

## 2021-08-06 RX ADMIN — HYDROCODONE BITARTRATE AND ACETAMINOPHEN 1 TABLET: 5; 325 TABLET ORAL at 14:54

## 2021-08-06 RX ADMIN — CEFTRIAXONE 1 G: 1 INJECTION, POWDER, FOR SOLUTION INTRAMUSCULAR; INTRAVENOUS at 17:07

## 2021-08-06 NOTE — ED PROVIDER NOTES
EMERGENCY DEPARTMENT HISTORY AND PHYSICAL EXAM    12:58 PM      Date: 8/6/2021  Patient Name: Willie Jha    History of Presenting Illness     Chief Complaint   Patient presents with    Foot Pain    Shortness of Breath         History Provided By: Patient    Additional History (Context): Willie Jha is a 80 y.o. female with Past medical history of arthritis, stroke, gout, hypertension, irritable bowel, chronic pain who presents with chief complaint of moderate to severe left foot pain on and off for several months, but worsening over the past few days. She states that she does have a history of gout but this does not feel like that because the pain includes her entire foot. She reports that she is had cellulitis in the left lower leg and foot on and off since last year. She denies any trauma. She does report that the foot is swollen and warm to touch. She also complains of some left lower extremity tenderness. She states she has some mild shortness of breath, but denies any cough, fever, chest pain, vomiting, diarrhea, abdominal pain, flank pain, back pain, numbness, weakness, dizziness no other complaints.     PCP: FABIENNE Perez        Past History     Past Medical History:  Past Medical History:   Diagnosis Date    Arthritis     osteo    Asthma     inhaler as needed - every 2 to 3 years    Chronic pain     knee and hip ,shoulder     GERD (gastroesophageal reflux disease)     Hypertension     5yrs    Irritable bowel     Other ill-defined conditions(799.89)     h/o migraines    Other ill-defined conditions(799.89)     h/o dizzy    Other ill-defined conditions(799.89)     IBS; hiatal hernia    Psychiatric disorder     depression    Stroke ([address] 9 years ago     stroke - no deficits    Stroke ([address]     In 1997/98       Past Surgical History:  Past Surgical History:   Procedure Laterality Date    HX CHOLECYSTECTOMY      HX HERNIA REPAIR      umbilical twice    HX HYSTERECTOMY      HX INTRAOCULAR LENS PROSTHESIS      HX KNEE ARTHROSCOPY      left    HX KNEE REPLACEMENT Left 2013    HX OTHER SURGICAL      herniated disc surgery    HX TUBAL LIGATION      LAP UMBILICAL HERNIA REPAIR      NEUROLOGICAL PROCEDURE UNLISTED      lower back    NM CARDIAC SURG PROCEDURE UNLIST      NM LAP,CHOLECYSTECTOMY         Family History:  Family History   Problem Relation Age of Onset    Cancer Father     Malignant Hyperthermia Neg Hx     Pseudocholinesterase Deficiency Neg Hx     Post-op Nausea/Vomiting Neg Hx     Delayed Awakening Neg Hx     Emergence Delirium Neg Hx     Post-op Cognitive Dysfunction Neg Hx     Other Neg Hx        Social History:  Social History     Tobacco Use    Smoking status: Never Smoker    Smokeless tobacco: Never Used   Substance Use Topics    Alcohol use: No    Drug use: No       Allergies:  No Known Allergies      Review of Systems       Review of Systems   Constitutional: Negative for chills and fever. HENT: Negative for congestion, rhinorrhea, sore throat and trouble swallowing. Eyes: Negative for visual disturbance. Respiratory: Positive for shortness of breath. Negative for cough and wheezing. Cardiovascular: Negative for chest pain and palpitations. Gastrointestinal: Negative for abdominal pain, nausea and vomiting. Endocrine: Negative for polyuria. Genitourinary: Negative for dysuria and flank pain. Musculoskeletal: Positive for arthralgias. Negative for back pain and neck stiffness. Left foot pain, swelling and redness   Skin: Negative for pallor and rash. Neurological: Negative for dizziness, weakness, numbness and headaches. Hematological: Does not bruise/bleed easily. Psychiatric/Behavioral: Negative for confusion and dysphoric mood. All other systems reviewed and are negative.         Physical Exam     Visit Vitals  /64   Pulse (!) 59   Temp 98.2 °F (36.8 °C)   Resp 16   Ht 5' 4\" (1.626 m)   Wt 105.2 kg (232 lb)   SpO2 97%   BMI 39.82 kg/m²         Physical Exam  Vitals and nursing note reviewed. Constitutional:       General: She is not in acute distress. Appearance: She is well-developed. She is not ill-appearing, toxic-appearing or diaphoretic. HENT:      Head: Normocephalic and atraumatic. Eyes:      General: No scleral icterus. Conjunctiva/sclera: Conjunctivae normal.      Pupils: Pupils are equal, round, and reactive to light. Cardiovascular:      Rate and Rhythm: Normal rate. Pulses: Normal pulses. Comments: Capillary refill < 3 seconds  Pulmonary:      Effort: Pulmonary effort is normal. No tachypnea or respiratory distress. Breath sounds: Normal breath sounds. No stridor. No decreased breath sounds, wheezing, rhonchi or rales. Abdominal:      General: Bowel sounds are normal. There is no distension. Palpations: Abdomen is soft. Tenderness: There is no abdominal tenderness. Musculoskeletal:         General: Swelling (Some swelling ofLeft loot and distal left leg. Has symmetric dorsalis pedal pulses and normal capillary refill. No discoloration of the toes, no necrosis. Slight tenderness of the left lower calf. Has full range of motion bilateral lower extremities. ..) present. No deformity or signs of injury. Normal range of motion. Cervical back: Normal range of motion and neck supple. Right lower leg: No tenderness. No edema. Left lower leg: Tenderness present. Lymphadenopathy:      Cervical: No cervical adenopathy. Skin:     General: Skin is warm and dry. Coloration: Skin is not cyanotic or pale. Findings: Erythema present. No ecchymosis. Neurological:      General: No focal deficit present. Mental Status: She is alert and oriented to person, place, and time. Cranial Nerves: No cranial nerve deficit. Motor: No weakness.    Psychiatric:         Mood and Affect: Mood normal.           Diagnostic Study Results     Labs -  Recent Results (from the past 12 hour(s))   EKG, 12 LEAD, INITIAL    Collection Time: 08/06/21 12:01 PM   Result Value Ref Range    Ventricular Rate 76 BPM    Atrial Rate 76 BPM    P-R Interval 178 ms    QRS Duration 92 ms    Q-T Interval 376 ms    QTC Calculation (Bezet) 423 ms    Calculated P Axis 49 degrees    Calculated R Axis -20 degrees    Calculated T Axis 34 degrees    Diagnosis       Normal sinus rhythm  Moderate voltage criteria for LVH, may be normal variant  Cannot rule out Septal infarct , age undetermined  Abnormal ECG  When compared with ECG of 21-MAY-2021 09:13,  No significant change was found     CBC WITH AUTOMATED DIFF    Collection Time: 08/06/21 12:10 PM   Result Value Ref Range    WBC 15.7 (H) 4.6 - 13.2 K/uL    RBC 4.06 (L) 4.20 - 5.30 M/uL    HGB 12.7 12.0 - 16.0 g/dL    HCT 36.6 35.0 - 45.0 %    MCV 90.1 74.0 - 97.0 FL    MCH 31.3 24.0 - 34.0 PG    MCHC 34.7 31.0 - 37.0 g/dL    RDW 14.2 11.6 - 14.5 %    PLATELET 000 212 - 650 K/uL    MPV 9.6 9.2 - 11.8 FL    NEUTROPHILS 79 (H) 40 - 73 %    LYMPHOCYTES 11 (L) 21 - 52 %    MONOCYTES 9 3 - 10 %    EOSINOPHILS 0 0 - 5 %    BASOPHILS 0 0 - 2 %    ABS. NEUTROPHILS 12.3 (H) 1.8 - 8.0 K/UL    ABS. LYMPHOCYTES 1.8 0.9 - 3.6 K/UL    ABS. MONOCYTES 1.4 (H) 0.05 - 1.2 K/UL    ABS. EOSINOPHILS 0.1 0.0 - 0.4 K/UL    ABS.  BASOPHILS 0.1 0.0 - 0.1 K/UL    DF AUTOMATED     CARDIAC PANEL,(CK, CKMB & TROPONIN)    Collection Time: 08/06/21 12:10 PM   Result Value Ref Range    CK - MB <1.0 <3.6 ng/ml    CK-MB Index  0.0 - 4.0 %     CALCULATION NOT PERFORMED WHEN RESULT IS BELOW LINEAR LIMIT    CK 91 26 - 192 U/L    Troponin-I, QT <0.02 0.0 - 1.915 NG/ML   METABOLIC PANEL, COMPREHENSIVE    Collection Time: 08/06/21 12:10 PM   Result Value Ref Range    Sodium 137 136 - 145 mmol/L    Potassium 3.9 3.5 - 5.5 mmol/L    Chloride 104 100 - 111 mmol/L    CO2 28 21 - 32 mmol/L    Anion gap 5 3.0 - 18 mmol/L    Glucose 123 (H) 74 - 99 mg/dL    BUN 13 7.0 - 18 MG/DL    Creatinine 1. 03 0.6 - 1.3 MG/DL    BUN/Creatinine ratio 13 12 - 20      GFR est AA >60 >60 ml/min/1.73m2    GFR est non-AA 51 (L) >60 ml/min/1.73m2    Calcium 9.4 8.5 - 10.1 MG/DL    Bilirubin, total 1.1 (H) 0.2 - 1.0 MG/DL    ALT (SGPT) 82 (H) 13 - 56 U/L    AST (SGOT) 108 (H) 10 - 38 U/L    Alk. phosphatase 116 45 - 117 U/L    Protein, total 7.5 6.4 - 8.2 g/dL    Albumin 3.5 3.4 - 5.0 g/dL    Globulin 4.0 2.0 - 4.0 g/dL    A-G Ratio 0.9 0.8 - 1.7     URIC ACID    Collection Time: 08/06/21 12:10 PM   Result Value Ref Range    Uric acid 6.3 2.6 - 7.2 MG/DL   D DIMER    Collection Time: 08/06/21 12:10 PM   Result Value Ref Range    D DIMER 1.40 (H) <0.46 ug/ml(FEU)       Radiologic Studies -   CTA CHEST W OR W WO CONT   Final Result      No evidence of a pulmonary embolism or aortic dissection. Small hiatal hernia. Right lung nodules measuring up to 1 cm. Recommend follow-up as per PET CT   report dated June 22, 2021. XR CHEST PORT   Final Result   Minimal left basilar subsegmental atelectasis or scarring. Otherwise, no acute cardiopulmonary disease. *   *      DUPLEX LOWER EXT VENOUS LEFT    (Results Pending)         Medical Decision Making   I am the first provider for this patient. I reviewed the vital signs, available nursing notes, past medical history, past surgical history, family history and social history. Vital Signs-Reviewed the patient's vital signs. Pulse Oximetry Analysis -  97% on room air (Interpretation) normal    Cardiac Monitor:  Rate: 75  Rhythm:  Normal Sinus Rhythm     EKG: Interpreted by the EP Dr. Azar Jarvis.    Time Interpreted: 12:01 PM   Rate: 76   Rhythm: Normal Sinus Rhythm    Interpretation: Normal QRS duration, normal QTC, no ST elevation, no ST depression, no STEMI, possible LVH present     Records Reviewed: Nursing Notes and Old Medical Records (Time of Review: 12:58 PM)    Provider Notes (Medical Decision Making): DDx: Gout, DVT, PE, metabolic, cardiac, cellulitis    We will check labs, EKG, chest x-ray, give analgesia, prednisone      MDM    Medications   HYDROcodone-acetaminophen (NORCO) 5-325 mg per tablet 1 Tablet (1 Tablet Oral Given 8/6/21 1454)   predniSONE (DELTASONE) tablet 60 mg (60 mg Oral Given 8/6/21 1454)   cefTRIAXone (ROCEPHIN) 1 g in sterile water (preservative free) 10 mL IV syringe (1 g IntraVENous New Bag 8/6/21 1707)   iopamidoL (ISOVUE-370) 76 % injection  mL (100 mL IntraVENous Given 8/6/21 1817)       Duplex left lower extremity:Interpretation Summary    · No evidence of deep vein thrombosis in the left lower extremity. Right Lower Venous    For comparison purposes, the right common femoral vein was briefly interrogated. The vein demonstrates normal color filling and compressibility. Doppler flow was phasic and spontaneous. The common femoral vein(s) were imaged in the transverse and longitudinal planes. The vessels showed normal color filling and compressibility. Doppler interrogation of the veins showed phasic and spontaneous flow. Left Lower Venous    No evidence of deep vein thrombosis in the left lower extremity. The common femoral, saphenous femoral junction, profunda femoral, femoral, popliteal and posterior tibial vein(s) were imaged in the transverse and longitudinal planes. The vessels showed normal color filling and compressibility. Doppler interrogation of the veins showed phasic and spontaneous flow. The peroneal vein(s) were not well visualized. ED Course: Progress Notes, Reevaluation, and Consults:  D-dimer elevated  WBC 15.7    Vitals are stable, afebrile, respirations normal, heart rate in the 70s. Did not meet any SIRS criteria. She is not septic. Ordered duplex to evaluate for DVT and CTA to evaluate for PE    We will get blood cultures and start IV Rocephin for possible cellulitis    Negative DVT and no PE.     Patient is feeling much better after IV antibiotic and analgesia. Consult:  Discussed care with Dr. Ashu Rodriguez, Specialty: Hospitalist, standard discussion; including history of patients chief complaint, available diagnostic results, and treatment course. She agrees the patient can be discharged home and follow-up with PCP to get a infectious disease referral outpatient. We discussed patient's stable vital signs and negative duplex and CTA. I will treat her for cellulitis. 7:33 PM, 8/6/2021       I have reassessed the patient. I have discussed the workup, results and plan with the patient and patient is in agreement. Patient is feeling much better. Patient will be prescribed Vicodin, Keflex, Bactrim. Patient was discharge in stable condition. Patient was given outpatient follow up. Patient is to return to emergency department if any new or worsening condition. Diagnosis     Clinical Impression:   1. Cellulitis of left foot    2. Dyspnea on exertion    3. Recurrent cellulitis    4. Foot pain, left        Disposition: Discharged    Follow-up Information     Follow up With Specialties Details Why Contact Info    Sayra Delgado PA Physician Assistant Schedule an appointment as soon as possible for a visit in 3 days Discuss with your primary care provider, that the ER doctor as well as [facility] hospitalist requests PCP to give you outpatient referral to infectious disease regarding recurrent cellulitis [facility] [address]      [facility] EMERGENCY DEPT Emergency Medicine  As needed, If symptoms worsen [address] [facility] [id number]  [phone]           Patient's Medications   Start Taking    CEPHALEXIN (KEFLEX) 500 MG CAPSULE    Take 1 Capsule by mouth three (3) times daily for 7 days. Indications: an infection of the skin and the tissue below the skin    FLUCONAZOLE (DIFLUCAN) 100 MG TABLET    Take 1 Tablet by mouth daily for 2 days.  Take to prevent yeast infection from antibiotic    HYDROCODONE-ACETAMINOPHEN (NORCO) 5-325 MG PER TABLET    Take 1 Tablet by mouth every six (6) hours as needed for Pain for up to 3 days. Max Daily Amount: 4 Tablets. TRIMETHOPRIM-SULFAMETHOXAZOLE (BACTRIM DS) 160-800 MG PER TABLET    Take 1 Tablet by mouth two (2) times a day for 5 days. Indications: an infection of the skin and the tissue below the skin   Continue Taking    AMLODIPINE (NORVASC) 5 MG TABLET    Take 2 Tablets by mouth daily. CALCIUM CARBONATE (TUMS) 200 MG CALCIUM (500 MG) CHEW    Take 1 Tablet by mouth as needed for Other (GERD). COLCHICINE 0.6 MG TABLET    Take 1 Tablet by mouth daily. DEXLANSOPRAZOLE (DEXILANT) 60 MG CPDB    Take 60 mg by mouth as needed for Other (GERD). DIPYRIDAMOLE-ASPIRIN (AGGRENOX) 200-25 MG PER SR CAPSULE    Take 1 Cap by mouth daily. FOLIC ACID/MULTIVIT-MIN/LUTEIN (CENTRUM SILVER PO)    Take 1 Tablet by mouth daily. GABAPENTIN (NEURONTIN) 600 MG TABLET    Take 600 mg by mouth three (3) times daily. Indications: neuropathic pain    MECLIZINE (ANTIVERT) 12.5 MG TABLET    Take 12.5 mg by mouth three (3) times daily as needed. These Medications have changed    No medications on file   Stop Taking    OXYCODONE-ACETAMINOPHEN (PERCOCET) 5-325 MG PER TABLET    Take 1 Tablet by mouth every four (4) hours as needed for Pain. DO Yasmeen Tierney medical dictation software was used for portions of this report. Unintended transcription errors may occur. My signature above authenticates this document and my orders, the final    diagnosis (es), discharge prescription (s), and instructions in the Epic    record.

## 2021-08-06 NOTE — ED NOTES
Verbal shift change report given to Yoon Covington RN (oncoming nurse) by Army Da RN (offgoing nurse). Report included the following information SBAR, Kardex, ED Summary, [facility] and Recent Results.

## 2021-08-07 RX ORDER — HYDROCODONE BITARTRATE AND ACETAMINOPHEN 5; 325 MG/1; MG/1
1 TABLET ORAL
Qty: 10 TABLET | Refills: 0 | Status: SHIPPED | OUTPATIENT
Start: 2021-08-07 | End: 2021-08-10

## 2021-08-12 LAB
BACTERIA SPEC CULT: NORMAL
BACTERIA SPEC CULT: NORMAL
SERVICE CMNT-IMP: NORMAL
SERVICE CMNT-IMP: NORMAL

## 2022-03-18 PROBLEM — R06.00 DYSPNEA: Status: ACTIVE | Noted: 2021-05-21

## 2022-03-18 PROBLEM — K59.00 CONSTIPATION: Status: ACTIVE | Noted: 2021-05-23

## 2022-03-19 PROBLEM — R00.1 BRADYCARDIA: Status: ACTIVE | Noted: 2021-05-21

## 2022-03-19 PROBLEM — R91.1 LUNG NODULE: Status: ACTIVE | Noted: 2021-05-21

## 2022-03-19 PROBLEM — L03.116 CELLULITIS OF LEFT FOOT: Status: ACTIVE | Noted: 2021-05-21

## 2022-03-20 PROBLEM — Z96.642 H/O TOTAL HIP ARTHROPLASTY, LEFT: Status: ACTIVE | Noted: 2019-01-11

## 2022-05-12 NOTE — ROUTINE PROCESS
Discharged instructions reviewed with pt & daughter, verbalized understanding. home via w/c in stable condition in care of daughter. Denies pain. Pt lives alone in apt with 4 steps to enter. Pt at baseline is ind for ADLs and functional mobility. No DME needed. Pt states she has shower/tub with shower chair; reports difficulties 2/2 B knee OA however manages./alone

## 2023-02-07 ENCOUNTER — TRANSCRIBE ORDER (OUTPATIENT)
Dept: SCHEDULING | Age: 88
End: 2023-02-07

## 2023-02-07 DIAGNOSIS — R91.1 SOLITARY PULMONARY NODULE: Primary | ICD-10-CM

## 2023-02-23 DIAGNOSIS — R91.1 SOLITARY PULMONARY NODULE: Primary | ICD-10-CM

## 2023-04-05 ENCOUNTER — HOSPITAL ENCOUNTER (OUTPATIENT)
Facility: HOSPITAL | Age: 88
Discharge: HOME | End: 2023-04-08
Payer: MEDICARE

## 2023-04-05 DIAGNOSIS — R91.1 SOLITARY LUNG NODULE: ICD-10-CM

## 2023-04-05 PROCEDURE — 71250 CT THORAX DX C-: CPT

## (undated) DEVICE — SYR 10ML CTRL LR LCK NSAF LF --

## (undated) DEVICE — ANGIOGRAPHIC CATHETER: Brand: EXPO™

## (undated) DEVICE — Device

## (undated) DEVICE — T4 ZIPPER TOGA, (L/XL)

## (undated) DEVICE — KENDALL SCD EXPRESS SLEEVES, KNEE LENGTH, MEDIUM: Brand: KENDALL SCD

## (undated) DEVICE — SUT ETHLN 3-0 18IN PS1 BLK --

## (undated) DEVICE — STOPCOCK TRNSDUC 500PSI 3 W ROT M LUER LT BLU OFF HNDL R

## (undated) DEVICE — STERILE POLYISOPRENE POWDER-FREE SURGICAL GLOVES: Brand: PROTEXIS

## (undated) DEVICE — STERILE POLYISOPRENE POWDER-FREE SURGICAL GLOVES WITH EMOLLIENT COATING: Brand: PROTEXIS

## (undated) DEVICE — (D)STRIP SKN CLSR 0.5X4IN WHT --

## (undated) DEVICE — SHEET,DRAPE,70X85,STERILE: Brand: MEDLINE

## (undated) DEVICE — DRAIN KT WND 10FR RND 400ML --

## (undated) DEVICE — SUT VCRL + 3-0 27IN CT2 UD --

## (undated) DEVICE — PICO 7 10CM X 40CM: Brand: PICO™ 7

## (undated) DEVICE — GOWN,SIRUS,NONRNF,SETINSLV,2XL,18/CS: Brand: MEDLINE

## (undated) DEVICE — SINGLE PORT MANIFOLD: Brand: NEPTUNE 2

## (undated) DEVICE — LEGGINGS, PAIR, 31X48, STERILE: Brand: MEDLINE

## (undated) DEVICE — SUT VCRL + 1 36IN CT1 VIO --

## (undated) DEVICE — PROCEDURE KIT FLUID MGMT 10 FR CUST MAINFOLD

## (undated) DEVICE — SHIELD RAD 14X16 IN W/ SCOOP ABSORBER

## (undated) DEVICE — NEEDLE HYPO 22GA L1.5IN BLK S STL HUB POLYPR SHLD REG BVL

## (undated) DEVICE — SUT VCRL + 0 36IN CT1 UD --

## (undated) DEVICE — SENSOR PLSE OXMTR AD CBL L36IN ADH FRM FIT SPO2 DISP

## (undated) DEVICE — Z DISCONTINUED USE (MFG CAT 7984-37) SOLUTION IV SODIUM CHL 0.9% 100 ML INJ

## (undated) DEVICE — T5 HOOD WITH PEEL AWAY FACE SHIELD

## (undated) DEVICE — PACK PROCEDURE SURG CATH CUST

## (undated) DEVICE — PRESSURE MONITORING SET: Brand: TRUWAVE

## (undated) DEVICE — (D)PREP SKN CHLRAPRP APPL 26ML -- CONVERT TO ITEM 371833

## (undated) DEVICE — SOL IRRIGATION INJ NACL 0.9% 500ML BTL

## (undated) DEVICE — SUT ETHBND 1 30IN OS8 GRN --

## (undated) DEVICE — NDL PRT INJ NSAF BLNT 18GX1.5 --

## (undated) DEVICE — GLIDESHEATH SLENDER STAINLESS STEEL KIT: Brand: GLIDESHEATH SLENDER

## (undated) DEVICE — BLADE SAW 1.19X20X90 MM FOR LG BNE

## (undated) DEVICE — LINER GLV L R FULL FNGR KEVLAR LYCRA CUT RESIST PWD FREE ST

## (undated) DEVICE — REM POLYHESIVE ADULT PATIENT RETURN ELECTRODE: Brand: VALLEYLAB

## (undated) DEVICE — BAND COMPR L29CM XLN CLR PLAS HEMSTAT EXT HK AND LOOP RETEN

## (undated) DEVICE — SUT MCRYL + 3-0 27IN PS1 UD --

## (undated) DEVICE — PACK PROCEDURE SURG ANTR HIP

## (undated) DEVICE — TUBING PRSS MON L24IN PVC RIG NONEXPANDING M TO FEM CONN

## (undated) DEVICE — SYRINGE BLB 50CC IRRIG PLIABLE FNGR FLNG GRAD FLSK DISP

## (undated) DEVICE — DRAPE,ANGIO,BRACH,STERILE,38X44: Brand: MEDLINE

## (undated) DEVICE — GUIDEWIRE VASC L260CM DIA0.035IN RAD 3MM J TIP L7CM PTFE

## (undated) DEVICE — Z DISCONTINUED USE 2429233 DRESSING FOAM W10XL10CM 5 LAYR SELF ADH VERSATILE SAFETAC

## (undated) DEVICE — DRESSING FOAM SELF ADH 20X10 CM ABSORBENT MEPILEX BORDER

## (undated) DEVICE — BIPOLAR SEALER 23-301-1 AQM MBS: Brand: AQUAMANTYS™